# Patient Record
Sex: MALE | Race: ASIAN | NOT HISPANIC OR LATINO
[De-identification: names, ages, dates, MRNs, and addresses within clinical notes are randomized per-mention and may not be internally consistent; named-entity substitution may affect disease eponyms.]

---

## 2019-05-16 ENCOUNTER — RESULT CHARGE (OUTPATIENT)
Age: 69
End: 2019-05-16

## 2019-05-17 ENCOUNTER — APPOINTMENT (OUTPATIENT)
Dept: CARDIOLOGY | Facility: CLINIC | Age: 69
End: 2019-05-17
Payer: MEDICARE

## 2019-05-17 ENCOUNTER — APPOINTMENT (OUTPATIENT)
Dept: ENDOCRINOLOGY | Facility: CLINIC | Age: 69
End: 2019-05-17
Payer: MEDICARE

## 2019-05-17 ENCOUNTER — NON-APPOINTMENT (OUTPATIENT)
Age: 69
End: 2019-05-17

## 2019-05-17 VITALS
WEIGHT: 150 LBS | HEIGHT: 63 IN | BODY MASS INDEX: 26.58 KG/M2 | TEMPERATURE: 98.2 F | OXYGEN SATURATION: 98 % | DIASTOLIC BLOOD PRESSURE: 82 MMHG | HEART RATE: 90 BPM | SYSTOLIC BLOOD PRESSURE: 140 MMHG

## 2019-05-17 VITALS — DIASTOLIC BLOOD PRESSURE: 82 MMHG | SYSTOLIC BLOOD PRESSURE: 134 MMHG

## 2019-05-17 VITALS
TEMPERATURE: 98.3 F | HEIGHT: 63 IN | DIASTOLIC BLOOD PRESSURE: 80 MMHG | WEIGHT: 150 LBS | HEART RATE: 90 BPM | BODY MASS INDEX: 26.58 KG/M2 | SYSTOLIC BLOOD PRESSURE: 140 MMHG | OXYGEN SATURATION: 98 %

## 2019-05-17 DIAGNOSIS — Z78.9 OTHER SPECIFIED HEALTH STATUS: ICD-10-CM

## 2019-05-17 DIAGNOSIS — L81.4 OTHER MELANIN HYPERPIGMENTATION: ICD-10-CM

## 2019-05-17 DIAGNOSIS — Z86.39 PERSONAL HISTORY OF OTHER ENDOCRINE, NUTRITIONAL AND METABOLIC DISEASE: ICD-10-CM

## 2019-05-17 DIAGNOSIS — Z87.39 PERSONAL HISTORY OF OTHER DISEASES OF THE MUSCULOSKELETAL SYSTEM AND CONNECTIVE TISSUE: ICD-10-CM

## 2019-05-17 DIAGNOSIS — E11.65 TYPE 2 DIABETES MELLITUS WITH HYPERGLYCEMIA: ICD-10-CM

## 2019-05-17 PROCEDURE — 82962 GLUCOSE BLOOD TEST: CPT

## 2019-05-17 PROCEDURE — 83036 HEMOGLOBIN GLYCOSYLATED A1C: CPT | Mod: QW

## 2019-05-17 PROCEDURE — 93000 ELECTROCARDIOGRAM COMPLETE: CPT

## 2019-05-17 PROCEDURE — 99214 OFFICE O/P EST MOD 30 MIN: CPT

## 2019-05-17 PROCEDURE — 99204 OFFICE O/P NEW MOD 45 MIN: CPT

## 2019-05-17 RX ORDER — FLUORIDE 0.02 G/ML
81 LIQUID ORAL
Refills: 0 | Status: ACTIVE | COMMUNITY

## 2019-05-17 NOTE — HISTORY OF PRESENT ILLNESS
[FreeTextEntry1] : Mr. HAYS is a 68 year year old  male who  returns today in follow up with regard to a history of type 2 diabetes mellitus.  Dm opresent for 4-5 years.There is no known history of retinopathy, nephropathy. He  too denies any history of neuropathy. Current dm medication include   Metformin 500 mg bid  HGM of late has shown values to be running 130-135 iin am  . There has been no significant hypoglycemia.  denies any chest pain, sob, neurologic or ophthalmologic complaints. He  too denies any new podiatric concerns. He  is up to date with his ophthalmologic visit.\par Additional medical history includes that of  htn, and hyperlipidemia. Is on losartan and Pravastatin\par \par Notes stiffness /weakens thigh region more so on rt-notices it when he stands up-has upcoming neo t with Dr. Kam-neurosurg\par \par \par

## 2019-05-24 LAB
GLUCOSE BLDC GLUCOMTR-MCNC: 123
HBA1C MFR BLD HPLC: 8

## 2019-05-26 LAB
BASOPHILS # BLD AUTO: 0.06 K/UL
BASOPHILS NFR BLD AUTO: 0.6 %
EOSINOPHIL # BLD AUTO: 0.42 K/UL
EOSINOPHIL NFR BLD AUTO: 4.3 %
FERRITIN SERPL-MCNC: 62 NG/ML
FOLATE SERPL-MCNC: 16.2 NG/ML
HCT VFR BLD CALC: 43.9 %
HGB BLD-MCNC: 13.8 G/DL
IMM GRANULOCYTES NFR BLD AUTO: 0.2 %
IRON SATN MFR SERPL: 18 %
IRON SERPL-MCNC: 69 UG/DL
LYMPHOCYTES # BLD AUTO: 2.72 K/UL
LYMPHOCYTES NFR BLD AUTO: 27.5 %
MAN DIFF?: NORMAL
MCHC RBC-ENTMCNC: 29.1 PG
MCHC RBC-ENTMCNC: 31.4 GM/DL
MCV RBC AUTO: 92.6 FL
MONOCYTES # BLD AUTO: 1.14 K/UL
MONOCYTES NFR BLD AUTO: 11.5 %
NEUTROPHILS # BLD AUTO: 5.52 K/UL
NEUTROPHILS NFR BLD AUTO: 55.9 %
PLATELET # BLD AUTO: 457 K/UL
RBC # BLD: 4.74 M/UL
RBC # FLD: 11.9 %
TIBC SERPL-MCNC: 378 UG/DL
UIBC SERPL-MCNC: 309 UG/DL
VIT B12 SERPL-MCNC: 1366 PG/ML
WBC # FLD AUTO: 9.88 K/UL

## 2019-05-27 NOTE — HISTORY OF PRESENT ILLNESS
[de-identified] : This is a 68year old gentlemen with a history of HLD, HTN, DM and back pain. Patient had a recent MRI that was abnormal and was referred to physiatry and Neurosurgery. Patient states that he is experiencing terrible back pain that is getting increasingly worse. The pain is mainly in the lower back area and shoots down the legs. Patient has not taken anything to help with the pain. Patient also notes that he now has "dark spots" behind his left ear and on both legs near the groin area. Patient denies dyspnea, palpitations, chest pain, nausea, vomiting, dizziness and lightheadedness.\par

## 2019-05-27 NOTE — REVIEW OF SYSTEMS
[Back Pain] : back pain [Negative] : Heme/Lymph [Joint Pain] : no joint pain [Muscle Pain] : no muscle pain [Muscle Weakness] : no muscle weakness [Joint Stiffness] : no joint stiffness [Joint Swelling] : no joint swelling [Itching] : no itching [Mole Changes] : no mole changes [Nail Changes] : no nail changes [Hair Changes] : no hair changes [Skin Rash] : no skin rash [de-identified] : black spots behind ear and legs

## 2019-05-27 NOTE — PHYSICAL EXAM
[No Acute Distress] : no acute distress [Well Nourished] : well nourished [Well Developed] : well developed [Normal Sclera/Conjunctiva] : normal sclera/conjunctiva [Well-Appearing] : well-appearing [PERRL] : pupils equal round and reactive to light [EOMI] : extraocular movements intact [Normal Outer Ear/Nose] : the outer ears and nose were normal in appearance [Normal Oropharynx] : the oropharynx was normal [No JVD] : no jugular venous distention [Supple] : supple [No Lymphadenopathy] : no lymphadenopathy [Thyroid Normal, No Nodules] : the thyroid was normal and there were no nodules present [No Respiratory Distress] : no respiratory distress  [Clear to Auscultation] : lungs were clear to auscultation bilaterally [Normal Rate] : normal rate  [No Accessory Muscle Use] : no accessory muscle use [Regular Rhythm] : with a regular rhythm [Normal S1, S2] : normal S1 and S2 [No Murmur] : no murmur heard [No Carotid Bruits] : no carotid bruits [No Abdominal Bruit] : a ~M bruit was not heard ~T in the abdomen [No Varicosities] : no varicosities [Pedal Pulses Present] : the pedal pulses are present [No Extremity Clubbing/Cyanosis] : no extremity clubbing/cyanosis [No Edema] : there was no peripheral edema [No Palpable Aorta] : no palpable aorta [Soft] : abdomen soft [Non Tender] : non-tender [Non-distended] : non-distended [No Masses] : no abdominal mass palpated [No HSM] : no HSM [Normal Bowel Sounds] : normal bowel sounds [Normal Posterior Cervical Nodes] : no posterior cervical lymphadenopathy [Normal Anterior Cervical Nodes] : no anterior cervical lymphadenopathy [No CVA Tenderness] : no CVA  tenderness [No Spinal Tenderness] : no spinal tenderness [No Joint Swelling] : no joint swelling [Grossly Normal Strength/Tone] : grossly normal strength/tone [No Rash] : no rash [Normal Gait] : normal gait [Coordination Grossly Intact] : coordination grossly intact [No Focal Deficits] : no focal deficits [Deep Tendon Reflexes (DTR)] : deep tendon reflexes were 2+ and symmetric [Normal Affect] : the affect was normal [Normal Insight/Judgement] : insight and judgment were intact [de-identified] : B

## 2019-05-28 LAB
BASOPHILS # BLD AUTO: 0.04 K/UL
BASOPHILS NFR BLD AUTO: 0.4 %
EOSINOPHIL # BLD AUTO: 0.4 K/UL
EOSINOPHIL NFR BLD AUTO: 4 %
HCT VFR BLD CALC: 46.1 %
HGB BLD-MCNC: 14.4 G/DL
IMM GRANULOCYTES NFR BLD AUTO: 0.2 %
LYMPHOCYTES # BLD AUTO: 2.88 K/UL
LYMPHOCYTES NFR BLD AUTO: 29.1 %
MAN DIFF?: NORMAL
MCHC RBC-ENTMCNC: 28.9 PG
MCHC RBC-ENTMCNC: 31.2 GM/DL
MCV RBC AUTO: 92.4 FL
MONOCYTES # BLD AUTO: 0.68 K/UL
MONOCYTES NFR BLD AUTO: 6.9 %
NEUTROPHILS # BLD AUTO: 5.87 K/UL
NEUTROPHILS NFR BLD AUTO: 59.4 %
PLATELET # BLD AUTO: 481 K/UL
PSA SERPL-MCNC: 1.67 NG/ML
RBC # BLD: 4.99 M/UL
RBC # FLD: 12.2 %
VIT B12 SERPL-MCNC: 1132 PG/ML
WBC # FLD AUTO: 9.89 K/UL

## 2019-06-05 ENCOUNTER — APPOINTMENT (OUTPATIENT)
Dept: SPINE | Facility: CLINIC | Age: 69
End: 2019-06-05
Payer: MEDICARE

## 2019-06-05 VITALS
HEART RATE: 90 BPM | TEMPERATURE: 98.3 F | SYSTOLIC BLOOD PRESSURE: 154 MMHG | DIASTOLIC BLOOD PRESSURE: 80 MMHG | BODY MASS INDEX: 26.58 KG/M2 | RESPIRATION RATE: 16 BRPM | WEIGHT: 150 LBS | HEIGHT: 63 IN

## 2019-06-05 PROCEDURE — 99204 OFFICE O/P NEW MOD 45 MIN: CPT

## 2019-06-05 RX ORDER — CYCLOBENZAPRINE HYDROCHLORIDE 10 MG/1
10 TABLET, FILM COATED ORAL
Qty: 21 | Refills: 0 | Status: DISCONTINUED | COMMUNITY
Start: 2019-05-17 | End: 2019-06-05

## 2019-07-08 ENCOUNTER — APPOINTMENT (OUTPATIENT)
Dept: SPINE | Facility: CLINIC | Age: 69
End: 2019-07-08

## 2019-09-03 ENCOUNTER — RX RENEWAL (OUTPATIENT)
Age: 69
End: 2019-09-03

## 2019-09-16 ENCOUNTER — APPOINTMENT (OUTPATIENT)
Dept: ENDOCRINOLOGY | Facility: CLINIC | Age: 69
End: 2019-09-16
Payer: MEDICARE

## 2019-09-16 ENCOUNTER — RX RENEWAL (OUTPATIENT)
Age: 69
End: 2019-09-16

## 2019-09-16 ENCOUNTER — APPOINTMENT (OUTPATIENT)
Dept: CARDIOLOGY | Facility: CLINIC | Age: 69
End: 2019-09-16
Payer: MEDICARE

## 2019-09-16 VITALS
SYSTOLIC BLOOD PRESSURE: 152 MMHG | DIASTOLIC BLOOD PRESSURE: 80 MMHG | BODY MASS INDEX: 26.05 KG/M2 | HEIGHT: 63 IN | HEART RATE: 92 BPM | OXYGEN SATURATION: 98 % | WEIGHT: 147 LBS

## 2019-09-16 VITALS
BODY MASS INDEX: 26.05 KG/M2 | DIASTOLIC BLOOD PRESSURE: 84 MMHG | SYSTOLIC BLOOD PRESSURE: 160 MMHG | HEART RATE: 92 BPM | WEIGHT: 147 LBS | TEMPERATURE: 98.3 F | OXYGEN SATURATION: 98 % | HEIGHT: 63 IN

## 2019-09-16 DIAGNOSIS — R09.89 OTHER SPECIFIED SYMPTOMS AND SIGNS INVOLVING THE CIRCULATORY AND RESPIRATORY SYSTEMS: ICD-10-CM

## 2019-09-16 DIAGNOSIS — Z11.59 ENCOUNTER FOR SCREENING FOR OTHER VIRAL DISEASES: ICD-10-CM

## 2019-09-16 LAB
ALBUMIN SERPL ELPH-MCNC: 4.7 G/DL
ALBUMIN: 80
ALP BLD-CCNC: 61 U/L
ALT SERPL-CCNC: 10 U/L
ANION GAP SERPL CALC-SCNC: 12 MMOL/L
AST SERPL-CCNC: 15 U/L
BILIRUB SERPL-MCNC: 0.7 MG/DL
BUN SERPL-MCNC: 17 MG/DL
CALCIUM SERPL-MCNC: 10 MG/DL
CHLORIDE SERPL-SCNC: 102 MMOL/L
CO2 SERPL-SCNC: 25 MMOL/L
CREAT SERPL-MCNC: 1.32 MG/DL
CREAT SPEC-SCNC: 144 MG/DL
CREATININE: 200
ESTIMATED AVERAGE GLUCOSE: 166 MG/DL
FRUCTOSAMINE SERPL-MCNC: 282 UMOL/L
GLUCOSE BLDC GLUCOMTR-MCNC: 148
GLUCOSE SERPL-MCNC: 138 MG/DL
GLYCOMARK.: 8.1 UG/ML
HBA1C MFR BLD HPLC: 7.2
HBA1C MFR BLD HPLC: 7.4 %
HDLC SERPL-MCNC: 53 MG/DL
LDLC SERPL DIRECT ASSAY-MCNC: 132 MG/DL
MICROALBUMIN 24H UR DL<=1MG/L-MCNC: 2.5 MG/DL
MICROALBUMIN/CREAT 24H UR-RTO: 17 MG/G
MICROALBUMIN/CREAT UR TEST STR-RTO: NORMAL
POTASSIUM SERPL-SCNC: 6.2 MMOL/L
PROT SERPL-MCNC: 7.4 G/DL
SODIUM SERPL-SCNC: 139 MMOL/L
T4 FREE SERPL-MCNC: 1.2 NG/DL
TRIGL SERPL-MCNC: 145 MG/DL
TSH SERPL-ACNC: 2.34 UIU/ML

## 2019-09-16 PROCEDURE — 93925 LOWER EXTREMITY STUDY: CPT

## 2019-09-16 PROCEDURE — 83036 HEMOGLOBIN GLYCOSYLATED A1C: CPT | Mod: QW

## 2019-09-16 PROCEDURE — 82044 UR ALBUMIN SEMIQUANTITATIVE: CPT | Mod: QW

## 2019-09-16 PROCEDURE — 93880 EXTRACRANIAL BILAT STUDY: CPT

## 2019-09-16 PROCEDURE — 82962 GLUCOSE BLOOD TEST: CPT

## 2019-09-16 PROCEDURE — 99213 OFFICE O/P EST LOW 20 MIN: CPT | Mod: 25

## 2019-09-16 PROCEDURE — 90732 PPSV23 VACC 2 YRS+ SUBQ/IM: CPT

## 2019-09-16 PROCEDURE — 99214 OFFICE O/P EST MOD 30 MIN: CPT

## 2019-09-16 PROCEDURE — G0009: CPT

## 2019-09-16 NOTE — HISTORY OF PRESENT ILLNESS
[FreeTextEntry1] : pt presents for f/u medical issues pt with htn /dm /hyperlipidemia .pt with improved back pain feels well pt denies any chest  pain dizziness ,lightheadedness ,nausea vomiting diaphoresis\par saw dr watkins re cory seeing optqalmology today

## 2019-09-16 NOTE — PHYSICAL EXAM
[Normal Appearance] : normal appearance [General Appearance - Well Developed] : well developed [Well Groomed] : well groomed [General Appearance - Well Nourished] : well nourished [No Deformities] : no deformities [Normal Conjunctiva] : the conjunctiva exhibited no abnormalities [Eyelids - No Xanthelasma] : the eyelids demonstrated no xanthelasmas [General Appearance - In No Acute Distress] : no acute distress [Normal Oral Mucosa] : normal oral mucosa [No Oral Pallor] : no oral pallor [No Oral Cyanosis] : no oral cyanosis [Normal Jugular Venous A Waves Present] : normal jugular venous A waves present [Normal Jugular Venous V Waves Present] : normal jugular venous V waves present [No Jugular Venous Olvera A Waves] : no jugular venous olvera A waves [Respiration, Rhythm And Depth] : normal respiratory rhythm and effort [Auscultation Breath Sounds / Voice Sounds] : lungs were clear to auscultation bilaterally [Exaggerated Use Of Accessory Muscles For Inspiration] : no accessory muscle use [Heart Sounds] : normal S1 and S2 [Heart Rate And Rhythm] : heart rate and rhythm were normal [Murmurs] : no murmurs present [Abdomen Soft] : soft [Abdomen Tenderness] : non-tender [Abdomen Mass (___ Cm)] : no abdominal mass palpated [Abnormal Walk] : normal gait [Gait - Sufficient For Exercise Testing] : the gait was sufficient for exercise testing [Nail Clubbing] : no clubbing of the fingernails [Cyanosis, Localized] : no localized cyanosis [Petechial Hemorrhages (___cm)] : no petechial hemorrhages [Skin Color & Pigmentation] : normal skin color and pigmentation [] : no rash [No Venous Stasis] : no venous stasis [Skin Lesions] : no skin lesions [No Skin Ulcers] : no skin ulcer [Oriented To Time, Place, And Person] : oriented to person, place, and time [No Xanthoma] : no  xanthoma was observed [Affect] : the affect was normal [Mood] : the mood was normal [No Anxiety] : not feeling anxious

## 2019-09-16 NOTE — PHYSICAL EXAM
[Alert] : alert [No Acute Distress] : no acute distress [Well Nourished] : well nourished [Normal Sclera/Conjunctiva] : normal sclera/conjunctiva [Well Developed] : well developed [No Proptosis] : no proptosis [EOMI] : extra ocular movement intact [Thyroid Not Enlarged] : the thyroid was not enlarged [Normal Oropharynx] : the oropharynx was normal [No Thyroid Nodules] : there were no palpable thyroid nodules [No Respiratory Distress] : no respiratory distress [No Accessory Muscle Use] : no accessory muscle use [Clear to Auscultation] : lungs were clear to auscultation bilaterally [Normal Rate] : heart rate was normal  [Normal S1, S2] : normal S1 and S2 [Regular Rhythm] : with a regular rhythm [No Edema] : there was no peripheral edema [Normal Bowel Sounds] : normal bowel sounds [Soft] : abdomen soft [Not Tender] : non-tender [Anterior Cervical Nodes] : anterior cervical nodes [Post Cervical Nodes] : posterior cervical nodes [Not Distended] : not distended [Axillary Nodes] : axillary nodes [Normal] : normal and non tender [No Spinal Tenderness] : no spinal tenderness [Spine Straight] : spine straight [Normal Gait] : normal gait [No Stigmata of Cushings Syndrome] : no stigmata of cushings syndrome [Normal Strength/Tone] : muscle strength and tone were normal [No Rash] : no rash [Normal Reflexes] : deep tendon reflexes were 2+ and symmetric [No Tremors] : no tremors [Oriented x3] : oriented to person, place, and time [Acanthosis Nigricans] : no acanthosis nigricans [de-identified] : ? Carotid bruit on rt [de-identified] : ? carotid bruit on rt  decr dp piulses.

## 2019-09-22 LAB
ALBUMIN SERPL ELPH-MCNC: 4.6 G/DL
ALP BLD-CCNC: 63 U/L
ALT SERPL-CCNC: 11 U/L
APPEARANCE: CLEAR
AST SERPL-CCNC: 13 U/L
BACTERIA: NEGATIVE
BASOPHILS # BLD AUTO: 0.07 K/UL
BASOPHILS NFR BLD AUTO: 0.8 %
BILIRUB DIRECT SERPL-MCNC: 0.2 MG/DL
BILIRUB INDIRECT SERPL-MCNC: 0.6 MG/DL
BILIRUB SERPL-MCNC: 0.7 MG/DL
BILIRUBIN URINE: NEGATIVE
BLOOD URINE: NEGATIVE
CHOLEST SERPL-MCNC: 192 MG/DL
CHOLEST/HDLC SERPL: 3.5 RATIO
CK SERPL-CCNC: 96 U/L
COLOR: NORMAL
EOSINOPHIL # BLD AUTO: 0.44 K/UL
EOSINOPHIL NFR BLD AUTO: 5.1 %
FERRITIN SERPL-MCNC: 28 NG/ML
FOLATE SERPL-MCNC: 12.6 NG/ML
GLUCOSE QUALITATIVE U: NEGATIVE
HAPTOGLOB SERPL-MCNC: 177 MG/DL
HCT VFR BLD CALC: 45.5 %
HCV AB SER QL: NONREACTIVE
HCV S/CO RATIO: 0.09 S/CO
HDLC SERPL-MCNC: 55 MG/DL
HGB BLD-MCNC: 14.5 G/DL
HYALINE CASTS: 0 /LPF
IMM GRANULOCYTES NFR BLD AUTO: 0.2 %
IRON SERPL-MCNC: 74 UG/DL
KETONES URINE: NEGATIVE
LDH SERPL-CCNC: 158 U/L
LDLC SERPL CALC-MCNC: 107 MG/DL
LDLC SERPL DIRECT ASSAY-MCNC: 131 MG/DL
LEUKOCYTE ESTERASE URINE: NEGATIVE
LYMPHOCYTES # BLD AUTO: 2.63 K/UL
LYMPHOCYTES NFR BLD AUTO: 30.7 %
MAN DIFF?: NORMAL
MCHC RBC-ENTMCNC: 29 PG
MCHC RBC-ENTMCNC: 31.9 GM/DL
MCV RBC AUTO: 91 FL
MICROSCOPIC-UA: NORMAL
MONOCYTES # BLD AUTO: 0.59 K/UL
MONOCYTES NFR BLD AUTO: 6.9 %
NEUTROPHILS # BLD AUTO: 4.82 K/UL
NEUTROPHILS NFR BLD AUTO: 56.3 %
NITRITE URINE: NEGATIVE
PH URINE: 6.5
PLATELET # BLD AUTO: 456 K/UL
PROT SERPL-MCNC: 7.3 G/DL
PROTEIN URINE: NORMAL
RBC # BLD: 5 M/UL
RBC # FLD: 12.2 %
RED BLOOD CELLS URINE: 1 /HPF
SPECIFIC GRAVITY URINE: 1.02
SQUAMOUS EPITHELIAL CELLS: 0 /HPF
TRANSFERRIN SERPL-MCNC: 328 MG/DL
TRIGL SERPL-MCNC: 149 MG/DL
TSH SERPL-ACNC: 2.22 UIU/ML
UROBILINOGEN URINE: NORMAL
VIT B12 SERPL-MCNC: 614 PG/ML
WBC # FLD AUTO: 8.57 K/UL
WHITE BLOOD CELLS URINE: 0 /HPF

## 2019-09-28 LAB
25(OH)D3 SERPL-MCNC: 31.3 NG/ML
ANION GAP SERPL CALC-SCNC: 12 MMOL/L
BUN SERPL-MCNC: 17 MG/DL
CALCIUM SERPL-MCNC: 9.4 MG/DL
CHLORIDE SERPL-SCNC: 102 MMOL/L
CO2 SERPL-SCNC: 25 MMOL/L
CREAT SERPL-MCNC: 1.23 MG/DL
GLUCOSE SERPL-MCNC: 148 MG/DL
POTASSIUM SERPL-SCNC: 4.6 MMOL/L
SODIUM SERPL-SCNC: 139 MMOL/L

## 2019-10-24 ENCOUNTER — APPOINTMENT (OUTPATIENT)
Dept: CARDIOLOGY | Facility: CLINIC | Age: 69
End: 2019-10-24
Payer: MEDICARE

## 2019-10-24 ENCOUNTER — NON-APPOINTMENT (OUTPATIENT)
Age: 69
End: 2019-10-24

## 2019-10-24 VITALS
WEIGHT: 146 LBS | TEMPERATURE: 97.9 F | BODY MASS INDEX: 25.87 KG/M2 | OXYGEN SATURATION: 98 % | DIASTOLIC BLOOD PRESSURE: 80 MMHG | SYSTOLIC BLOOD PRESSURE: 160 MMHG | HEART RATE: 94 BPM | HEIGHT: 63 IN

## 2019-10-24 PROCEDURE — 90653 IIV ADJUVANT VACCINE IM: CPT

## 2019-10-24 PROCEDURE — G0008: CPT

## 2019-10-24 PROCEDURE — 93000 ELECTROCARDIOGRAM COMPLETE: CPT

## 2019-10-24 PROCEDURE — 99213 OFFICE O/P EST LOW 20 MIN: CPT | Mod: 25

## 2019-10-24 NOTE — PHYSICAL EXAM
[General Appearance - Well Developed] : well developed [Well Groomed] : well groomed [Normal Appearance] : normal appearance [General Appearance - Well Nourished] : well nourished [No Deformities] : no deformities [General Appearance - In No Acute Distress] : no acute distress [Normal Conjunctiva] : the conjunctiva exhibited no abnormalities [Eyelids - No Xanthelasma] : the eyelids demonstrated no xanthelasmas [No Oral Pallor] : no oral pallor [Normal Oral Mucosa] : normal oral mucosa [No Oral Cyanosis] : no oral cyanosis [Normal Jugular Venous A Waves Present] : normal jugular venous A waves present [Normal Jugular Venous V Waves Present] : normal jugular venous V waves present [No Jugular Venous Olvera A Waves] : no jugular venous olvera A waves [Respiration, Rhythm And Depth] : normal respiratory rhythm and effort [Auscultation Breath Sounds / Voice Sounds] : lungs were clear to auscultation bilaterally [Heart Rate And Rhythm] : heart rate and rhythm were normal [Exaggerated Use Of Accessory Muscles For Inspiration] : no accessory muscle use [Murmurs] : no murmurs present [Heart Sounds] : normal S1 and S2 [Abdomen Soft] : soft [Abdomen Tenderness] : non-tender [Abdomen Mass (___ Cm)] : no abdominal mass palpated [Abnormal Walk] : normal gait [Gait - Sufficient For Exercise Testing] : the gait was sufficient for exercise testing [Nail Clubbing] : no clubbing of the fingernails [Petechial Hemorrhages (___cm)] : no petechial hemorrhages [Cyanosis, Localized] : no localized cyanosis [Skin Color & Pigmentation] : normal skin color and pigmentation [No Venous Stasis] : no venous stasis [] : no rash [No Skin Ulcers] : no skin ulcer [Skin Lesions] : no skin lesions [No Xanthoma] : no  xanthoma was observed [Affect] : the affect was normal [Oriented To Time, Place, And Person] : oriented to person, place, and time [Mood] : the mood was normal [No Anxiety] : not feeling anxious

## 2019-10-24 NOTE — HISTORY OF PRESENT ILLNESS
[FreeTextEntry1] : Hair is a 67yo male here for pre-op clearance. He has hx of HTN, DM2, HLD, and DARWIN. He is scheduled to have cataract surgery of the right eye on 10/29. He is doing well overall. Denies chest pain, palpitations, shortness of breath, or dizziness.

## 2019-10-26 LAB
ALBUMIN SERPL ELPH-MCNC: 4.6 G/DL
ALP BLD-CCNC: 56 U/L
ALT SERPL-CCNC: 10 U/L
ANION GAP SERPL CALC-SCNC: 14 MMOL/L
AST SERPL-CCNC: 13 U/L
BASOPHILS # BLD AUTO: 0.08 K/UL
BASOPHILS NFR BLD AUTO: 0.7 %
BILIRUB DIRECT SERPL-MCNC: 0.1 MG/DL
BILIRUB INDIRECT SERPL-MCNC: 0.2 MG/DL
BILIRUB SERPL-MCNC: 0.3 MG/DL
BUN SERPL-MCNC: 21 MG/DL
CALCIUM SERPL-MCNC: 9.9 MG/DL
CHLORIDE SERPL-SCNC: 103 MMOL/L
CHOLEST SERPL-MCNC: 146 MG/DL
CHOLEST/HDLC SERPL: 2.9 RATIO
CK SERPL-CCNC: 85 U/L
CO2 SERPL-SCNC: 24 MMOL/L
CREAT SERPL-MCNC: 1.18 MG/DL
CREAT SPEC-SCNC: 152 MG/DL
EOSINOPHIL # BLD AUTO: 0.65 K/UL
EOSINOPHIL NFR BLD AUTO: 5.7 %
ESTIMATED AVERAGE GLUCOSE: 160 MG/DL
FERRITIN SERPL-MCNC: 21 NG/ML
FOLATE SERPL-MCNC: 13.9 NG/ML
GLUCOSE SERPL-MCNC: 111 MG/DL
HAPTOGLOB SERPL-MCNC: 199 MG/DL
HBA1C MFR BLD HPLC: 7.2 %
HCT VFR BLD CALC: 44.1 %
HDLC SERPL-MCNC: 50 MG/DL
HGB BLD-MCNC: 14.1 G/DL
IMM GRANULOCYTES NFR BLD AUTO: 0.4 %
IRON SATN MFR SERPL: 10 %
IRON SERPL-MCNC: 39 UG/DL
LDLC SERPL CALC-MCNC: 61 MG/DL
LDLC SERPL DIRECT ASSAY-MCNC: 85 MG/DL
LYMPHOCYTES # BLD AUTO: 3.52 K/UL
LYMPHOCYTES NFR BLD AUTO: 30.9 %
MAN DIFF?: NORMAL
MCHC RBC-ENTMCNC: 28.7 PG
MCHC RBC-ENTMCNC: 32 GM/DL
MCV RBC AUTO: 89.6 FL
MICROALBUMIN 24H UR DL<=1MG/L-MCNC: 1.5 MG/DL
MICROALBUMIN/CREAT 24H UR-RTO: 10 MG/G
MONOCYTES # BLD AUTO: 0.81 K/UL
MONOCYTES NFR BLD AUTO: 7.1 %
NEUTROPHILS # BLD AUTO: 6.27 K/UL
NEUTROPHILS NFR BLD AUTO: 55.2 %
PLATELET # BLD AUTO: 455 K/UL
POTASSIUM SERPL-SCNC: 5 MMOL/L
PROT SERPL-MCNC: 7 G/DL
RBC # BLD: 4.92 M/UL
RBC # FLD: 12.3 %
SODIUM SERPL-SCNC: 140 MMOL/L
TIBC SERPL-MCNC: 398 UG/DL
TRANSFERRIN SERPL-MCNC: 330 MG/DL
TRIGL SERPL-MCNC: 173 MG/DL
UIBC SERPL-MCNC: 359 UG/DL
VIT B12 SERPL-MCNC: 898 PG/ML
WBC # FLD AUTO: 11.38 K/UL

## 2019-10-28 ENCOUNTER — CLINICAL ADVICE (OUTPATIENT)
Age: 69
End: 2019-10-28

## 2019-11-06 ENCOUNTER — CHART COPY (OUTPATIENT)
Age: 69
End: 2019-11-06

## 2019-11-10 LAB
BASOPHILS # BLD AUTO: 0.06 K/UL
BASOPHILS NFR BLD AUTO: 0.5 %
EOSINOPHIL # BLD AUTO: 0.54 K/UL
EOSINOPHIL NFR BLD AUTO: 4.7 %
HCT VFR BLD CALC: 44.1 %
HGB BLD-MCNC: 14.2 G/DL
IMM GRANULOCYTES NFR BLD AUTO: 0.3 %
LYMPHOCYTES # BLD AUTO: 3.06 K/UL
LYMPHOCYTES NFR BLD AUTO: 26.4 %
MAN DIFF?: NORMAL
MCHC RBC-ENTMCNC: 28.9 PG
MCHC RBC-ENTMCNC: 32.2 GM/DL
MCV RBC AUTO: 89.8 FL
MONOCYTES # BLD AUTO: 0.94 K/UL
MONOCYTES NFR BLD AUTO: 8.1 %
NEUTROPHILS # BLD AUTO: 6.97 K/UL
NEUTROPHILS NFR BLD AUTO: 60 %
PLATELET # BLD AUTO: 464 K/UL
RBC # BLD: 4.91 M/UL
RBC # FLD: 12.1 %
WBC # FLD AUTO: 11.61 K/UL

## 2020-01-17 ENCOUNTER — APPOINTMENT (OUTPATIENT)
Dept: CARDIOLOGY | Facility: CLINIC | Age: 70
End: 2020-01-17
Payer: MEDICARE

## 2020-01-17 ENCOUNTER — APPOINTMENT (OUTPATIENT)
Dept: ENDOCRINOLOGY | Facility: CLINIC | Age: 70
End: 2020-01-17
Payer: MEDICARE

## 2020-01-17 VITALS
WEIGHT: 146 LBS | SYSTOLIC BLOOD PRESSURE: 160 MMHG | OXYGEN SATURATION: 99 % | DIASTOLIC BLOOD PRESSURE: 90 MMHG | BODY MASS INDEX: 25.87 KG/M2 | HEART RATE: 91 BPM | HEIGHT: 63 IN | TEMPERATURE: 98.6 F

## 2020-01-17 VITALS
HEIGHT: 63 IN | WEIGHT: 146 LBS | DIASTOLIC BLOOD PRESSURE: 90 MMHG | TEMPERATURE: 98.6 F | HEART RATE: 91 BPM | BODY MASS INDEX: 25.87 KG/M2 | SYSTOLIC BLOOD PRESSURE: 160 MMHG | OXYGEN SATURATION: 99 %

## 2020-01-17 DIAGNOSIS — H26.9 UNSPECIFIED CATARACT: ICD-10-CM

## 2020-01-17 LAB — HBA1C MFR BLD HPLC: 7.6

## 2020-01-17 PROCEDURE — 99214 OFFICE O/P EST MOD 30 MIN: CPT

## 2020-01-17 PROCEDURE — 82962 GLUCOSE BLOOD TEST: CPT

## 2020-01-17 PROCEDURE — 83036 HEMOGLOBIN GLYCOSYLATED A1C: CPT | Mod: QW

## 2020-01-17 PROCEDURE — 99213 OFFICE O/P EST LOW 20 MIN: CPT

## 2020-01-17 RX ORDER — METHYLPREDNISOLONE 4 MG/1
4 TABLET ORAL
Qty: 1 | Refills: 0 | Status: DISCONTINUED | COMMUNITY
Start: 2019-05-17 | End: 2020-01-17

## 2020-01-17 NOTE — PHYSICAL EXAM
[General Appearance - Well Developed] : well developed [Normal Appearance] : normal appearance [Well Groomed] : well groomed [General Appearance - Well Nourished] : well nourished [No Deformities] : no deformities [General Appearance - In No Acute Distress] : no acute distress [Eyelids - No Xanthelasma] : the eyelids demonstrated no xanthelasmas [Normal Conjunctiva] : the conjunctiva exhibited no abnormalities [Normal Oral Mucosa] : normal oral mucosa [No Oral Pallor] : no oral pallor [No Oral Cyanosis] : no oral cyanosis [Normal Jugular Venous A Waves Present] : normal jugular venous A waves present [Normal Jugular Venous V Waves Present] : normal jugular venous V waves present [No Jugular Venous Olvrea A Waves] : no jugular venous olvera A waves [Respiration, Rhythm And Depth] : normal respiratory rhythm and effort [Exaggerated Use Of Accessory Muscles For Inspiration] : no accessory muscle use [Auscultation Breath Sounds / Voice Sounds] : lungs were clear to auscultation bilaterally [Heart Rate And Rhythm] : heart rate and rhythm were normal [Murmurs] : no murmurs present [Heart Sounds] : normal S1 and S2 [Abdomen Tenderness] : non-tender [Abdomen Soft] : soft [Abnormal Walk] : normal gait [Abdomen Mass (___ Cm)] : no abdominal mass palpated [Nail Clubbing] : no clubbing of the fingernails [Gait - Sufficient For Exercise Testing] : the gait was sufficient for exercise testing [Cyanosis, Localized] : no localized cyanosis [Petechial Hemorrhages (___cm)] : no petechial hemorrhages [Skin Color & Pigmentation] : normal skin color and pigmentation [No Venous Stasis] : no venous stasis [] : no rash [Skin Lesions] : no skin lesions [No Skin Ulcers] : no skin ulcer [No Xanthoma] : no  xanthoma was observed [Mood] : the mood was normal [Affect] : the affect was normal [Oriented To Time, Place, And Person] : oriented to person, place, and time [No Anxiety] : not feeling anxious [FreeTextEntry1] : reproducible pain back area

## 2020-01-17 NOTE — HISTORY OF PRESENT ILLNESS
[FreeTextEntry1] : Hair is a 70yo male who presents for routine follow-up. Patient has PMH anemia, DM2, HLD, and lumbar  disc disease. Patient reports he is doing well overall, has no issues or concerns. Patient also had cataract surgery on October and doing well. Patient also reporting  continued back stiffness that extends to his leg muscles. He has hx of lumbar disc disease and lumbar radiculopathy.

## 2020-01-20 LAB — GLUCOSE BLDC GLUCOMTR-MCNC: 137

## 2020-01-24 LAB
ALBUMIN SERPL ELPH-MCNC: 4.8 G/DL
ALP BLD-CCNC: 64 U/L
ALT SERPL-CCNC: 11 U/L
ANION GAP SERPL CALC-SCNC: 15 MMOL/L
AST SERPL-CCNC: 16 U/L
BASOPHILS # BLD AUTO: 0.06 K/UL
BASOPHILS NFR BLD AUTO: 0.6 %
BILIRUB DIRECT SERPL-MCNC: 0.2 MG/DL
BILIRUB INDIRECT SERPL-MCNC: 0.6 MG/DL
BILIRUB SERPL-MCNC: 0.8 MG/DL
BUN SERPL-MCNC: 17 MG/DL
CALCIUM SERPL-MCNC: 10.3 MG/DL
CHLORIDE SERPL-SCNC: 100 MMOL/L
CHOLEST SERPL-MCNC: 170 MG/DL
CHOLEST/HDLC SERPL: 3.3 RATIO
CK SERPL-CCNC: 97 U/L
CO2 SERPL-SCNC: 24 MMOL/L
CREAT SERPL-MCNC: 1.19 MG/DL
CREAT SPEC-SCNC: 146 MG/DL
EOSINOPHIL # BLD AUTO: 0.35 K/UL
EOSINOPHIL NFR BLD AUTO: 3.7 %
ESTIMATED AVERAGE GLUCOSE: 177 MG/DL
FERRITIN SERPL-MCNC: 26 NG/ML
FOLATE SERPL-MCNC: 17.2 NG/ML
GLUCOSE SERPL-MCNC: 144 MG/DL
HBA1C MFR BLD HPLC: 7.8 %
HCT VFR BLD CALC: 46.4 %
HDLC SERPL-MCNC: 52 MG/DL
HGB BLD-MCNC: 14.7 G/DL
IMM GRANULOCYTES NFR BLD AUTO: 0.4 %
IRON SATN MFR SERPL: 22 %
IRON SERPL-MCNC: 89 UG/DL
LDLC SERPL CALC-MCNC: 95 MG/DL
LDLC SERPL DIRECT ASSAY-MCNC: 103 MG/DL
LYMPHOCYTES # BLD AUTO: 2.52 K/UL
LYMPHOCYTES NFR BLD AUTO: 26.9 %
MAN DIFF?: NORMAL
MCHC RBC-ENTMCNC: 28.4 PG
MCHC RBC-ENTMCNC: 31.7 GM/DL
MCV RBC AUTO: 89.6 FL
MICROALBUMIN 24H UR DL<=1MG/L-MCNC: 5 MG/DL
MICROALBUMIN/CREAT 24H UR-RTO: 34 MG/G
MONOCYTES # BLD AUTO: 0.78 K/UL
MONOCYTES NFR BLD AUTO: 8.3 %
NEUTROPHILS # BLD AUTO: 5.61 K/UL
NEUTROPHILS NFR BLD AUTO: 60.1 %
PLATELET # BLD AUTO: 315 K/UL
POTASSIUM SERPL-SCNC: 5.6 MMOL/L
PROT SERPL-MCNC: 7.5 G/DL
PSA SERPL-MCNC: 1.8 NG/ML
RBC # BLD: 5.18 M/UL
RBC # FLD: 12.5 %
SODIUM SERPL-SCNC: 140 MMOL/L
TIBC SERPL-MCNC: 403 UG/DL
TRIGL SERPL-MCNC: 114 MG/DL
UIBC SERPL-MCNC: 314 UG/DL
VIT B12 SERPL-MCNC: 807 PG/ML
WBC # FLD AUTO: 9.36 K/UL

## 2020-02-02 NOTE — HISTORY OF PRESENT ILLNESS
[FreeTextEntry1] : Mr. HAYS is a 69 year year old  male who  returns today in follow up with regard to a history of type 2 diabetes mellitus.  The Diabetes has been present for 4-5 years.There is no known history of retinopathy, nephropathy. He  too denies any history of neuropathy. Current dm medication include   Metformin 500 mg one in Am and two in PM.  HGM of late has shown values to be running 120's to 140 . There has been no significant hypoglycemia.  denies any chest pain, sob, neurologic or ophthalmologic complaints. He  too denies any new podiatric concerns. He  is up to date with his ophthalmologic visit.\par Additional medical history includes that of  htn, and hyperlipidemia. Is on losartan and Pravastatin and Ezetimibe.\par A1c slightly up from prior at 7.6% post holidays.\par \par \par \par \par \par

## 2020-02-02 NOTE — PHYSICAL EXAM
[Alert] : alert [No Acute Distress] : no acute distress [Well Nourished] : well nourished [Well Developed] : well developed [Normal Sclera/Conjunctiva] : normal sclera/conjunctiva [EOMI] : extra ocular movement intact [No Proptosis] : no proptosis [Normal Oropharynx] : the oropharynx was normal [Thyroid Not Enlarged] : the thyroid was not enlarged [No Thyroid Nodules] : there were no palpable thyroid nodules [No Respiratory Distress] : no respiratory distress [No Accessory Muscle Use] : no accessory muscle use [Clear to Auscultation] : lungs were clear to auscultation bilaterally [Normal Rate] : heart rate was normal  [Normal S1, S2] : normal S1 and S2 [Regular Rhythm] : with a regular rhythm [Pedal Pulses Normal] : the pedal pulses are present [No Edema] : there was no peripheral edema [Normal Bowel Sounds] : normal bowel sounds [Not Tender] : non-tender [Soft] : abdomen soft [Not Distended] : not distended [Post Cervical Nodes] : posterior cervical nodes [Anterior Cervical Nodes] : anterior cervical nodes [Axillary Nodes] : axillary nodes [Normal] : normal and non tender [No Spinal Tenderness] : no spinal tenderness [Spine Straight] : spine straight [No Stigmata of Cushings Syndrome] : no stigmata of cushings syndrome [Normal Gait] : normal gait [No Rash] : no rash [Normal Strength/Tone] : muscle strength and tone were normal [Normal Reflexes] : deep tendon reflexes were 2+ and symmetric [No Tremors] : no tremors [Oriented x3] : oriented to person, place, and time [Acanthosis Nigricans] : no acanthosis nigricans

## 2020-04-17 ENCOUNTER — APPOINTMENT (OUTPATIENT)
Dept: ENDOCRINOLOGY | Facility: CLINIC | Age: 70
End: 2020-04-17
Payer: MEDICARE

## 2020-04-17 ENCOUNTER — APPOINTMENT (OUTPATIENT)
Dept: ENDOCRINOLOGY | Facility: CLINIC | Age: 70
End: 2020-04-17

## 2020-04-17 ENCOUNTER — APPOINTMENT (OUTPATIENT)
Dept: CARDIOLOGY | Facility: CLINIC | Age: 70
End: 2020-04-17
Payer: MEDICARE

## 2020-04-17 PROCEDURE — 99213 OFFICE O/P EST LOW 20 MIN: CPT | Mod: 95

## 2020-04-17 PROCEDURE — 99214 OFFICE O/P EST MOD 30 MIN: CPT | Mod: 95

## 2020-04-17 NOTE — PHYSICAL EXAM
[Alert] : alert [Healthy Appearance] : healthy appearance [No Acute Distress] : no acute distress [Well Developed] : well developed [Oriented x3] : oriented to person, place, and time [Normal Affect] : the affect was normal [Normal Insight/Judgement] : insight and judgment were intact [Normal Mood] : the mood was normal

## 2020-04-17 NOTE — HISTORY OF PRESENT ILLNESS
[Home] : at home, [unfilled] , at the time of the visit. [Medical Office: ([facility])___] : at the medical office located in  [Patient] : the patient [Self] : self [FreeTextEntry1] : Mr. HAYS is a 69 year year old  male who  returns today bit video telemed ov  in follow up with regard to a history of type 2 diabetes mellitus.  The Diabetes has been present for about 5 years.There is no known history of retinopathy, nephropathy. He  too denies any history of neuropathy but note occasional tingling in gutierrez feet. Current dm medication include   Metformin 500 mg one in Am and one in PM. (had been one am and TWO in pm.  HGM of late has shown values to be running  1590 range but he is not testing that often. . There has been no significant hypoglycemia.  He  denies any chest pain, sob, neurologic or ophthalmologic complaints. He  too denies any new podiatric concerns. He  is up to date with his ophthalmologic visit.\par Additional medical history includes that of  htn, and hyperlipidemia. Is on losartan and Pravastatin and Ezetimibe. He though states he did not realize he was to be taking the Ezetimibe with the Pravastatin.  Denies muscle aches.\par \par \par \par \par \par \par

## 2020-04-19 NOTE — HISTORY OF PRESENT ILLNESS
[Home] : at home, [unfilled] , at the time of the visit. [Medical Office: ([facility])___] : at the medical office located in  [Patient] : the patient [Self] : self [FreeTextEntry1] : Hair is a 68 yo male who presents for a tele-health visit today. He has no issues or concerns for today. He reports that he also saw Dr. Davison today for a tele-health visit. Patient denies chest pain, shortness of breath, palpitations, dizziness, vision changes, n/v, abdominal pain, changes in bowel/bladder habits,  or appetite.

## 2020-06-19 ENCOUNTER — APPOINTMENT (OUTPATIENT)
Dept: CARDIOLOGY | Facility: CLINIC | Age: 70
End: 2020-06-19
Payer: MEDICARE

## 2020-06-19 ENCOUNTER — APPOINTMENT (OUTPATIENT)
Dept: ENDOCRINOLOGY | Facility: CLINIC | Age: 70
End: 2020-06-19
Payer: MEDICARE

## 2020-06-19 ENCOUNTER — NON-APPOINTMENT (OUTPATIENT)
Age: 70
End: 2020-06-19

## 2020-06-19 VITALS
BODY MASS INDEX: 26.05 KG/M2 | TEMPERATURE: 98.5 F | DIASTOLIC BLOOD PRESSURE: 82 MMHG | HEART RATE: 90 BPM | HEIGHT: 63 IN | OXYGEN SATURATION: 98 % | WEIGHT: 147 LBS | SYSTOLIC BLOOD PRESSURE: 142 MMHG

## 2020-06-19 VITALS
TEMPERATURE: 98.5 F | HEIGHT: 63 IN | WEIGHT: 147 LBS | DIASTOLIC BLOOD PRESSURE: 80 MMHG | OXYGEN SATURATION: 99 % | BODY MASS INDEX: 26.05 KG/M2 | HEART RATE: 82 BPM | SYSTOLIC BLOOD PRESSURE: 130 MMHG

## 2020-06-19 DIAGNOSIS — M54.16 RADICULOPATHY, LUMBAR REGION: ICD-10-CM

## 2020-06-19 DIAGNOSIS — Z71.89 OTHER SPECIFIED COUNSELING: ICD-10-CM

## 2020-06-19 LAB
GLUCOSE BLDC GLUCOMTR-MCNC: 135
HBA1C MFR BLD HPLC: 7.9

## 2020-06-19 PROCEDURE — 99214 OFFICE O/P EST MOD 30 MIN: CPT

## 2020-06-19 PROCEDURE — 83036 HEMOGLOBIN GLYCOSYLATED A1C: CPT | Mod: QW

## 2020-06-19 PROCEDURE — 82962 GLUCOSE BLOOD TEST: CPT

## 2020-06-19 PROCEDURE — 93000 ELECTROCARDIOGRAM COMPLETE: CPT

## 2020-06-19 NOTE — HISTORY OF PRESENT ILLNESS
[FreeTextEntry1] : Mr. HAYS is a 69 year year old  male who  returns today   in follow up with regard to a history of type 2 diabetes mellitus.  The Diabetes has been present for about 5 years.There is no known history of retinopathy, nephropathy. He  too denies any history of neuropathy but note occasional tingling in gutierrez feet. Current dm medication include   Metformin 500 mg one in Am and one in PM.   HGM of late has shown values to be running  under 150 range. There has been no significant hypoglycemia.  He  denies any chest pain, sob, neurologic or ophthalmologic complaints. He  too denies any new podiatric concerns. He  is up to date with his ophthalmologic visit.\par Additional medical history includes that of  htn, and hyperlipidemia. Is on losartan and Pravastatin and Ezetimibe. .  Denies muscle aches.Opho q 4 months.\par \par \par \par \par \par \par

## 2020-06-19 NOTE — PHYSICAL EXAM
[General Appearance - Well Developed] : well developed [Normal Appearance] : normal appearance [General Appearance - Well Nourished] : well nourished [No Deformities] : no deformities [Well Groomed] : well groomed [General Appearance - In No Acute Distress] : no acute distress [Normal Conjunctiva] : the conjunctiva exhibited no abnormalities [Eyelids - No Xanthelasma] : the eyelids demonstrated no xanthelasmas [Normal Oral Mucosa] : normal oral mucosa [No Oral Pallor] : no oral pallor [No Oral Cyanosis] : no oral cyanosis [Normal Jugular Venous V Waves Present] : normal jugular venous V waves present [Normal Jugular Venous A Waves Present] : normal jugular venous A waves present [Respiration, Rhythm And Depth] : normal respiratory rhythm and effort [No Jugular Venous Olvera A Waves] : no jugular venous olvera A waves [Auscultation Breath Sounds / Voice Sounds] : lungs were clear to auscultation bilaterally [Heart Rate And Rhythm] : heart rate and rhythm were normal [Heart Sounds] : normal S1 and S2 [Exaggerated Use Of Accessory Muscles For Inspiration] : no accessory muscle use [Murmurs] : no murmurs present [Abdomen Tenderness] : non-tender [Abdomen Soft] : soft [Abdomen Mass (___ Cm)] : no abdominal mass palpated [Abnormal Walk] : normal gait [FreeTextEntry1] : reproducible pain back area  [Gait - Sufficient For Exercise Testing] : the gait was sufficient for exercise testing [Cyanosis, Localized] : no localized cyanosis [Nail Clubbing] : no clubbing of the fingernails [Petechial Hemorrhages (___cm)] : no petechial hemorrhages [Skin Color & Pigmentation] : normal skin color and pigmentation [No Venous Stasis] : no venous stasis [Skin Lesions] : no skin lesions [] : no rash [Oriented To Time, Place, And Person] : oriented to person, place, and time [No Xanthoma] : no  xanthoma was observed [No Skin Ulcers] : no skin ulcer [Mood] : the mood was normal [No Anxiety] : not feeling anxious [Affect] : the affect was normal

## 2020-06-19 NOTE — HISTORY OF PRESENT ILLNESS
[FreeTextEntry1] : Hair is a 68yo male with PMH anemia, DM2, HLD, and lumbar disc disease presents today for routine follow-up. Patient reports continued lower back stiffness that extends down his left leg. He has been massaging the area to help with the stiffness, he was previously provided with Physiatry referral but was not able to schedule. Otherwise, pt doing well, no issues or complaints. Patient denies chest pain, shortness of breath, palpitations, dizziness, vision changes, n/v, abdominal pain, changes in bowel/bladder habits,  or appetite. pt with are fatigue

## 2020-06-19 NOTE — PHYSICAL EXAM
[Alert] : alert [Well Nourished] : well nourished [No Acute Distress] : no acute distress [Well Developed] : well developed [EOMI] : extra ocular movement intact [Normal Sclera/Conjunctiva] : normal sclera/conjunctiva [No Proptosis] : no proptosis [Thyroid Not Enlarged] : the thyroid was not enlarged [Normal Oropharynx] : the oropharynx was normal [No Respiratory Distress] : no respiratory distress [No Thyroid Nodules] : no palpable thyroid nodules [No Accessory Muscle Use] : no accessory muscle use [Clear to Auscultation] : lungs were clear to auscultation bilaterally [Normal S1, S2] : normal S1 and S2 [Normal Rate] : heart rate was normal [Regular Rhythm] : with a regular rhythm [No Edema] : no peripheral edema [Pedal Pulses Normal] : the pedal pulses are present [Normal Bowel Sounds] : normal bowel sounds [Not Tender] : non-tender [Not Distended] : not distended [Soft] : abdomen soft [Normal Anterior Cervical Nodes] : no anterior cervical lymphadenopathy [No Spinal Tenderness] : no spinal tenderness [Normal Posterior Cervical Nodes] : no posterior cervical lymphadenopathy [Spine Straight] : spine straight [No Stigmata of Cushings Syndrome] : no stigmata of Cushings Syndrome [Normal Gait] : normal gait [Normal Strength/Tone] : muscle strength and tone were normal [No Rash] : no rash [Acanthosis Nigricans] : no acanthosis nigricans [Normal Reflexes] : deep tendon reflexes were 2+ and symmetric [No Tremors] : no tremors [Oriented x3] : oriented to person, place, and time

## 2020-06-26 LAB
25(OH)D3 SERPL-MCNC: 41.6 NG/ML
ALBUMIN SERPL ELPH-MCNC: 4.9 G/DL
ALP BLD-CCNC: 59 U/L
ALT SERPL-CCNC: 12 U/L
ANION GAP SERPL CALC-SCNC: 16 MMOL/L
AST SERPL-CCNC: 15 U/L
BILIRUB SERPL-MCNC: 0.9 MG/DL
BUN SERPL-MCNC: 19 MG/DL
CALCIUM SERPL-MCNC: 10.6 MG/DL
CHLORIDE SERPL-SCNC: 100 MMOL/L
CHOLEST SERPL-MCNC: 158 MG/DL
CO2 SERPL-SCNC: 25 MMOL/L
CREAT SERPL-MCNC: 1.34 MG/DL
CREAT SPEC-SCNC: 171 MG/DL
GLUCOSE SERPL-MCNC: 132 MG/DL
HDLC SERPL-MCNC: 53 MG/DL
LDLC SERPL DIRECT ASSAY-MCNC: 93 MG/DL
MICROALBUMIN 24H UR DL<=1MG/L-MCNC: 2 MG/DL
MICROALBUMIN/CREAT 24H UR-RTO: 12 MG/G
POTASSIUM SERPL-SCNC: 5.4 MMOL/L
PROT SERPL-MCNC: 7.8 G/DL
SODIUM SERPL-SCNC: 142 MMOL/L
TRIGL SERPL-MCNC: 104 MG/DL

## 2020-07-14 LAB
APPEARANCE: CLEAR
BACTERIA: NEGATIVE
BASOPHILS # BLD AUTO: 0.04 K/UL
BASOPHILS NFR BLD AUTO: 0.4 %
BILIRUBIN URINE: NEGATIVE
BLOOD URINE: NEGATIVE
CK SERPL-CCNC: 94 U/L
COLOR: YELLOW
EOSINOPHIL # BLD AUTO: 0.48 K/UL
EOSINOPHIL NFR BLD AUTO: 4.8 %
FERRITIN SERPL-MCNC: 33 NG/ML
FOLATE SERPL-MCNC: 14.9 NG/ML
GLUCOSE QUALITATIVE U: NEGATIVE
HCT VFR BLD CALC: 47.1 %
HGB BLD-MCNC: 14.4 G/DL
HYALINE CASTS: 0 /LPF
IMM GRANULOCYTES NFR BLD AUTO: 0.3 %
IRON SATN MFR SERPL: 21 %
IRON SERPL-MCNC: 86 UG/DL
KETONES URINE: NEGATIVE
LEUKOCYTE ESTERASE URINE: NEGATIVE
LYMPHOCYTES # BLD AUTO: 2.74 K/UL
LYMPHOCYTES NFR BLD AUTO: 27.3 %
MAN DIFF?: NORMAL
MCHC RBC-ENTMCNC: 28 PG
MCHC RBC-ENTMCNC: 30.6 GM/DL
MCV RBC AUTO: 91.6 FL
MICROSCOPIC-UA: NORMAL
MONOCYTES # BLD AUTO: 0.79 K/UL
MONOCYTES NFR BLD AUTO: 7.9 %
NEUTROPHILS # BLD AUTO: 5.94 K/UL
NEUTROPHILS NFR BLD AUTO: 59.3 %
NITRITE URINE: NEGATIVE
PH URINE: 6.5
PLATELET # BLD AUTO: 435 K/UL
PROTEIN URINE: NORMAL
RBC # BLD: 5.14 M/UL
RBC # FLD: 12.6 %
RED BLOOD CELLS URINE: 1 /HPF
SARS-COV-2 IGG SERPL IA-ACNC: 9.48 AU/ML
SARS-COV-2 IGG SERPL QL IA: NEGATIVE
SPECIFIC GRAVITY URINE: 1.02
SQUAMOUS EPITHELIAL CELLS: 0 /HPF
T4 FREE SERPL-MCNC: 1.3 NG/DL
TIBC SERPL-MCNC: 415 UG/DL
TSH SERPL-ACNC: 3.08 UIU/ML
UIBC SERPL-MCNC: 329 UG/DL
UROBILINOGEN URINE: NORMAL
VIT B12 SERPL-MCNC: 828 PG/ML
WBC # FLD AUTO: 10.02 K/UL
WHITE BLOOD CELLS URINE: 1 /HPF

## 2020-09-18 ENCOUNTER — NON-APPOINTMENT (OUTPATIENT)
Age: 70
End: 2020-09-18

## 2020-09-18 ENCOUNTER — APPOINTMENT (OUTPATIENT)
Dept: CARDIOLOGY | Facility: CLINIC | Age: 70
End: 2020-09-18
Payer: MEDICARE

## 2020-09-18 VITALS
WEIGHT: 148 LBS | OXYGEN SATURATION: 96 % | HEART RATE: 88 BPM | HEIGHT: 63 IN | TEMPERATURE: 98 F | BODY MASS INDEX: 26.22 KG/M2 | DIASTOLIC BLOOD PRESSURE: 80 MMHG | SYSTOLIC BLOOD PRESSURE: 162 MMHG

## 2020-09-18 DIAGNOSIS — Z01.818 ENCOUNTER FOR OTHER PREPROCEDURAL EXAMINATION: ICD-10-CM

## 2020-09-18 DIAGNOSIS — H26.9 UNSPECIFIED CATARACT: ICD-10-CM

## 2020-09-18 PROCEDURE — 99213 OFFICE O/P EST LOW 20 MIN: CPT

## 2020-09-18 PROCEDURE — 93000 ELECTROCARDIOGRAM COMPLETE: CPT

## 2020-09-18 NOTE — PHYSICAL EXAM
[General Appearance - Well Developed] : well developed [Normal Appearance] : normal appearance [Well Groomed] : well groomed [General Appearance - Well Nourished] : well nourished [No Deformities] : no deformities [General Appearance - In No Acute Distress] : no acute distress [Normal Conjunctiva] : the conjunctiva exhibited no abnormalities [Eyelids - No Xanthelasma] : the eyelids demonstrated no xanthelasmas [Normal Oral Mucosa] : normal oral mucosa [No Oral Pallor] : no oral pallor [No Oral Cyanosis] : no oral cyanosis [Normal Jugular Venous A Waves Present] : normal jugular venous A waves present [Normal Jugular Venous V Waves Present] : normal jugular venous V waves present [No Jugular Venous Olvera A Waves] : no jugular venous olvera A waves [Respiration, Rhythm And Depth] : normal respiratory rhythm and effort [Exaggerated Use Of Accessory Muscles For Inspiration] : no accessory muscle use [Auscultation Breath Sounds / Voice Sounds] : lungs were clear to auscultation bilaterally [Heart Rate And Rhythm] : heart rate and rhythm were normal [Heart Sounds] : normal S1 and S2 [Murmurs] : no murmurs present [Abdomen Soft] : soft [Abdomen Tenderness] : non-tender [Abdomen Mass (___ Cm)] : no abdominal mass palpated [Abnormal Walk] : normal gait [Gait - Sufficient For Exercise Testing] : the gait was sufficient for exercise testing [Nail Clubbing] : no clubbing of the fingernails [Cyanosis, Localized] : no localized cyanosis [Petechial Hemorrhages (___cm)] : no petechial hemorrhages [Skin Color & Pigmentation] : normal skin color and pigmentation [] : no rash [No Venous Stasis] : no venous stasis [Skin Lesions] : no skin lesions [No Skin Ulcers] : no skin ulcer [No Xanthoma] : no  xanthoma was observed [Oriented To Time, Place, And Person] : oriented to person, place, and time [Affect] : the affect was normal [Mood] : the mood was normal [No Anxiety] : not feeling anxious

## 2020-10-19 ENCOUNTER — APPOINTMENT (OUTPATIENT)
Dept: ENDOCRINOLOGY | Facility: CLINIC | Age: 70
End: 2020-10-19

## 2020-10-19 ENCOUNTER — APPOINTMENT (OUTPATIENT)
Dept: CARDIOLOGY | Facility: CLINIC | Age: 70
End: 2020-10-19

## 2021-01-11 ENCOUNTER — APPOINTMENT (OUTPATIENT)
Dept: ENDOCRINOLOGY | Facility: CLINIC | Age: 71
End: 2021-01-11
Payer: MEDICARE

## 2021-01-11 ENCOUNTER — NON-APPOINTMENT (OUTPATIENT)
Age: 71
End: 2021-01-11

## 2021-01-11 ENCOUNTER — APPOINTMENT (OUTPATIENT)
Dept: CARDIOLOGY | Facility: CLINIC | Age: 71
End: 2021-01-11
Payer: MEDICARE

## 2021-01-11 VITALS
BODY MASS INDEX: 26.22 KG/M2 | SYSTOLIC BLOOD PRESSURE: 140 MMHG | DIASTOLIC BLOOD PRESSURE: 80 MMHG | WEIGHT: 148 LBS | OXYGEN SATURATION: 98 % | HEIGHT: 63 IN | TEMPERATURE: 98.7 F | HEART RATE: 96 BPM

## 2021-01-11 VITALS
WEIGHT: 148 LBS | OXYGEN SATURATION: 98 % | HEIGHT: 63 IN | HEART RATE: 115 BPM | SYSTOLIC BLOOD PRESSURE: 158 MMHG | BODY MASS INDEX: 26.22 KG/M2 | TEMPERATURE: 98.2 F | DIASTOLIC BLOOD PRESSURE: 82 MMHG

## 2021-01-11 VITALS — SYSTOLIC BLOOD PRESSURE: 132 MMHG | DIASTOLIC BLOOD PRESSURE: 78 MMHG

## 2021-01-11 PROCEDURE — 99072 ADDL SUPL MATRL&STAF TM PHE: CPT

## 2021-01-11 PROCEDURE — G0008: CPT

## 2021-01-11 PROCEDURE — 90662 IIV NO PRSV INCREASED AG IM: CPT

## 2021-01-11 PROCEDURE — 99214 OFFICE O/P EST MOD 30 MIN: CPT | Mod: 25

## 2021-01-11 PROCEDURE — 93000 ELECTROCARDIOGRAM COMPLETE: CPT

## 2021-01-11 PROCEDURE — 82962 GLUCOSE BLOOD TEST: CPT

## 2021-01-11 PROCEDURE — 99214 OFFICE O/P EST MOD 30 MIN: CPT

## 2021-01-11 PROCEDURE — 83036 HEMOGLOBIN GLYCOSYLATED A1C: CPT | Mod: QW

## 2021-01-11 NOTE — HISTORY OF PRESENT ILLNESS
[FreeTextEntry1] : Hair is a 69yo male with PMH anemia, DM2, HLD, and lumbar disc disease presents today for routine follow-up. Patient states he is feeling well today and has no issues or concerns for today. Patient denies chest pain,  , palpitations, dizziness, vision changes, n/v, abdominal pain, changes in bowel/bladder habits,  or appetite. pt with rare dyspnea pt with ocassional back pain

## 2021-01-11 NOTE — HISTORY OF PRESENT ILLNESS
[FreeTextEntry1] : Mr. HAYS is a 70 year year old  male who  returns today   in follow up with regard to a history of type 2 diabetes mellitus.  The Diabetes has been present for over 5 years.There is no known history of retinopathy, nephropathy. He  too denies any history of neuropathy but notes occasional tingling in gutierrez feet. Current dm medication include   Metformin 500 mg one in Am and one in PM.   HGM of late has shown values to be running  130's in the am mostly.     There has been no significant hypoglycemia.  He  denies any chest pain, sob, neurologic or ophthalmologic complaints. He  too denies any new podiatric concerns. He  is up to date with his ophthalmologic visits without hx of retinopathy.\par Additional medical history includes that of  htn, and hyperlipidemia. Is on losartan and Pravastatin and Ezetimibe.  Denies muscle aches.\par \par \par \par \par \par \par

## 2021-01-20 ENCOUNTER — APPOINTMENT (OUTPATIENT)
Dept: CARDIOLOGY | Facility: CLINIC | Age: 71
End: 2021-01-20
Payer: MEDICARE

## 2021-01-20 PROCEDURE — 99072 ADDL SUPL MATRL&STAF TM PHE: CPT

## 2021-01-20 PROCEDURE — A9500: CPT

## 2021-01-20 PROCEDURE — 78452 HT MUSCLE IMAGE SPECT MULT: CPT

## 2021-01-20 PROCEDURE — 93306 TTE W/DOPPLER COMPLETE: CPT

## 2021-01-20 PROCEDURE — 93015 CV STRESS TEST SUPVJ I&R: CPT

## 2021-01-20 RX ORDER — REGADENOSON 0.08 MG/ML
0.4 INJECTION, SOLUTION INTRAVENOUS
Qty: 1 | Refills: 0 | Status: COMPLETED | OUTPATIENT
Start: 2021-01-20

## 2021-01-20 RX ADMIN — REGADENOSON 5 MG/5ML: 0.08 INJECTION, SOLUTION INTRAVENOUS at 00:00

## 2021-01-21 ENCOUNTER — NON-APPOINTMENT (OUTPATIENT)
Age: 71
End: 2021-01-21

## 2021-02-18 ENCOUNTER — NON-APPOINTMENT (OUTPATIENT)
Age: 71
End: 2021-02-18

## 2021-02-21 LAB
25(OH)D3 SERPL-MCNC: 77.6 NG/ML
ALBUMIN SERPL ELPH-MCNC: 4.5 G/DL
ALP BLD-CCNC: 67 U/L
ALT SERPL-CCNC: 14 U/L
ANION GAP SERPL CALC-SCNC: 14 MMOL/L
AST SERPL-CCNC: 20 U/L
BILIRUB SERPL-MCNC: 1 MG/DL
BUN SERPL-MCNC: 21 MG/DL
CALCIUM SERPL-MCNC: 10 MG/DL
CHLORIDE SERPL-SCNC: 100 MMOL/L
CHOLEST SERPL-MCNC: 163 MG/DL
CO2 SERPL-SCNC: 24 MMOL/L
CREAT SERPL-MCNC: 1.32 MG/DL
CREAT SPEC-SCNC: 229 MG/DL
ESTIMATED AVERAGE GLUCOSE: 180 MG/DL
FRUCTOSAMINE SERPL-MCNC: 334 UMOL/L
GLUCOSE BLDC GLUCOMTR-MCNC: 149
GLUCOSE SERPL-MCNC: 132 MG/DL
GLYCOMARK.: 3.5 UG/ML
HBA1C MFR BLD HPLC: 7.6
HBA1C MFR BLD HPLC: 7.9 %
HDLC SERPL-MCNC: 48 MG/DL
LDLC SERPL DIRECT ASSAY-MCNC: 87 MG/DL
MICROALBUMIN 24H UR DL<=1MG/L-MCNC: 6.2 MG/DL
MICROALBUMIN/CREAT 24H UR-RTO: 27 MG/G
POTASSIUM SERPL-SCNC: 5 MMOL/L
PROT SERPL-MCNC: 7.8 G/DL
SODIUM SERPL-SCNC: 138 MMOL/L
T3FREE SERPL-MCNC: 3.14 PG/ML
T4 FREE SERPL-MCNC: 1.4 NG/DL
TRIGL SERPL-MCNC: 175 MG/DL
TSH SERPL-ACNC: 1.75 UIU/ML

## 2021-04-13 ENCOUNTER — APPOINTMENT (OUTPATIENT)
Dept: CARDIOLOGY | Facility: CLINIC | Age: 71
End: 2021-04-13
Payer: MEDICARE

## 2021-04-13 ENCOUNTER — APPOINTMENT (OUTPATIENT)
Dept: ENDOCRINOLOGY | Facility: CLINIC | Age: 71
End: 2021-04-13
Payer: MEDICARE

## 2021-04-13 ENCOUNTER — NON-APPOINTMENT (OUTPATIENT)
Age: 71
End: 2021-04-13

## 2021-04-13 VITALS
SYSTOLIC BLOOD PRESSURE: 125 MMHG | OXYGEN SATURATION: 98 % | BODY MASS INDEX: 26.4 KG/M2 | WEIGHT: 149 LBS | TEMPERATURE: 98.5 F | HEIGHT: 63 IN | HEART RATE: 97 BPM | DIASTOLIC BLOOD PRESSURE: 80 MMHG

## 2021-04-13 DIAGNOSIS — R82.90 UNSPECIFIED ABNORMAL FINDINGS IN URINE: ICD-10-CM

## 2021-04-13 PROCEDURE — 83036 HEMOGLOBIN GLYCOSYLATED A1C: CPT | Mod: QW

## 2021-04-13 PROCEDURE — 95250 CONT GLUC MNTR PHYS/QHP EQP: CPT

## 2021-04-13 PROCEDURE — 36415 COLL VENOUS BLD VENIPUNCTURE: CPT

## 2021-04-13 PROCEDURE — 93000 ELECTROCARDIOGRAM COMPLETE: CPT

## 2021-04-13 PROCEDURE — 99072 ADDL SUPL MATRL&STAF TM PHE: CPT

## 2021-04-13 PROCEDURE — 99214 OFFICE O/P EST MOD 30 MIN: CPT

## 2021-04-13 PROCEDURE — 99214 OFFICE O/P EST MOD 30 MIN: CPT | Mod: 25

## 2021-04-13 NOTE — HISTORY OF PRESENT ILLNESS
[FreeTextEntry1] : Hair is a 69yo male with PMH anemia, DM2, HLD, and lumbar disc disease presents today for routine follow-up. Patient states he is feeling well today and has no issues or concerns for today. Patient denies chest pain, , palpitations, dizziness, vision changes, n/v, abdominal pain, changes in bowel/bladder habits, or appetite. Patient also reports continued back pain, states he did not see neurologist as previously advised for pain. Also reports that he received first COVID vaccine and is scheduled for 2nd dose today.

## 2021-04-13 NOTE — HISTORY OF PRESENT ILLNESS
[FreeTextEntry1] : Mr. HAYS is a 70 year old male who returns today in follow up with regard to a history of type 2 diabetes mellitus. The Diabetes has been present for over 5 years.There is no known history of retinopathy, nephropathy. He too denies any history of neuropathy but notes occasional tingling in gutierrez feet. Current dm medication include Metformin 500 mg one in Am and one in PM. HGM of late has shown values to be running under 150s in the am. He does not test any other time. There has been no significant hypoglycemia. He denies any chest pain, sob, neurologic or ophthalmologic complaints. He too denies any new podiatric concerns. He is up to date with his ophthalmologic visits without hx of retinopathy.\par POCT A1c returned today at 8.4 %  \par POCT glucose returned today at  163  mg/dL\par \par Additional medical history includes that of hypertension, and hyperlipidemia. Is on losartan and Pravastatin and Ezetimibe.\par He does report lower extremity stiffness. Admits to not walking regularly. \par Scheduled to receive second dose of Pfizer vaccine today. \par

## 2021-04-28 ENCOUNTER — APPOINTMENT (OUTPATIENT)
Dept: ENDOCRINOLOGY | Facility: CLINIC | Age: 71
End: 2021-04-28
Payer: MEDICARE

## 2021-04-28 ENCOUNTER — APPOINTMENT (OUTPATIENT)
Dept: CARDIOLOGY | Facility: CLINIC | Age: 71
End: 2021-04-28
Payer: MEDICARE

## 2021-04-28 VITALS
DIASTOLIC BLOOD PRESSURE: 80 MMHG | HEART RATE: 78 BPM | WEIGHT: 149 LBS | OXYGEN SATURATION: 99 % | BODY MASS INDEX: 26.39 KG/M2 | TEMPERATURE: 98.6 F | SYSTOLIC BLOOD PRESSURE: 144 MMHG | RESPIRATION RATE: 16 BRPM

## 2021-04-28 DIAGNOSIS — R79.89 OTHER SPECIFIED ABNORMAL FINDINGS OF BLOOD CHEMISTRY: ICD-10-CM

## 2021-04-28 DIAGNOSIS — D72.829 ELEVATED WHITE BLOOD CELL COUNT, UNSPECIFIED: ICD-10-CM

## 2021-04-28 LAB
25(OH)D3 SERPL-MCNC: 63.3 NG/ML
ALBUMIN SERPL ELPH-MCNC: 4.4 G/DL
ALP BLD-CCNC: 60 U/L
ALT SERPL-CCNC: 10 U/L
ANION GAP SERPL CALC-SCNC: 12 MMOL/L
AST SERPL-CCNC: 15 U/L
BILIRUB SERPL-MCNC: 0.8 MG/DL
BUN SERPL-MCNC: 23 MG/DL
CALCIUM SERPL-MCNC: 9.9 MG/DL
CHLORIDE SERPL-SCNC: 101 MMOL/L
CHOLEST SERPL-MCNC: 136 MG/DL
CO2 SERPL-SCNC: 26 MMOL/L
CREAT SERPL-MCNC: 1.18 MG/DL
CREAT SPEC-SCNC: 203 MG/DL
ESTIMATED AVERAGE GLUCOSE: 200 MG/DL
FRUCTOSAMINE SERPL-MCNC: 335 UMOL/L
GLUCOSE BLDC GLUCOMTR-MCNC: 163
GLUCOSE SERPL-MCNC: 148 MG/DL
GLYCOMARK.: 3.3 UG/ML
HBA1C MFR BLD HPLC: 8.4
HBA1C MFR BLD HPLC: 8.6 %
HDLC SERPL-MCNC: 51 MG/DL
LDLC SERPL DIRECT ASSAY-MCNC: 71 MG/DL
MICROALBUMIN 24H UR DL<=1MG/L-MCNC: 6.6 MG/DL
MICROALBUMIN/CREAT 24H UR-RTO: 33 MG/G
POTASSIUM SERPL-SCNC: 5.2 MMOL/L
PROT SERPL-MCNC: 7.6 G/DL
SODIUM SERPL-SCNC: 139 MMOL/L
T3FREE SERPL-MCNC: 2.81 PG/ML
T4 FREE SERPL-MCNC: 1.2 NG/DL
TRIGL SERPL-MCNC: 111 MG/DL
TSH SERPL-ACNC: 2.37 UIU/ML

## 2021-04-28 PROCEDURE — 99214 OFFICE O/P EST MOD 30 MIN: CPT

## 2021-04-28 PROCEDURE — 93925 LOWER EXTREMITY STUDY: CPT

## 2021-04-28 PROCEDURE — 99072 ADDL SUPL MATRL&STAF TM PHE: CPT

## 2021-04-28 PROCEDURE — 93880 EXTRACRANIAL BILAT STUDY: CPT

## 2021-04-28 NOTE — HISTORY OF PRESENT ILLNESS
[FreeTextEntry1] : Mr. HAYS is a 70 year old male who returns today in follow up with regard to a history of type 2 diabetes mellitus. The Diabetes has been present for over 5 years. There is no known history of retinopathy, nephropathy. He  too denies any history of neuropathy but notes occasional tingling in gutierrez feet. Current dm medication include   Metformin 500 mg one in Am and one in PM.   HGM via kayleigh device from 04/13 - 04/27 has shown values to be running in the 150s-180s, sometimes up to mid 200s post-breakfast despite having a light breakfast, and up to 200- 300s post-dinner. There has been no significant hypoglycemia.  He  denies any chest pain, sob, neurologic or ophthalmologic complaints. He  too denies any new podiatric concerns. He  is up to date with his ophthalmologic visits without hx of retinopathy.\par A1c from 04/13/2021 returned at 8.6 % \par Pt has rice and roti for dinner. \par \par Additional medical history includes that of  htn, and hyperlipidemia. Is on losartan and Pravastatin and Ezetimibe.  Denies muscle \par aches.\par Carotid study done today revealed ICA 50 - 69% plaque buildup. \par WBC returned at 12.42, platelets returned at 466.

## 2021-06-10 ENCOUNTER — OUTPATIENT (OUTPATIENT)
Dept: OUTPATIENT SERVICES | Facility: HOSPITAL | Age: 71
LOS: 1 days | Discharge: HOME | End: 2021-06-10

## 2021-06-10 DIAGNOSIS — M48.8X2 OTHER SPECIFIED SPONDYLOPATHIES, CERVICAL REGION: Chronic | ICD-10-CM

## 2021-06-10 DIAGNOSIS — D47.3 ESSENTIAL (HEMORRHAGIC) THROMBOCYTHEMIA: ICD-10-CM

## 2021-06-14 ENCOUNTER — RESULT REVIEW (OUTPATIENT)
Age: 71
End: 2021-06-14

## 2021-06-14 ENCOUNTER — APPOINTMENT (OUTPATIENT)
Dept: HEMATOLOGY ONCOLOGY | Facility: CLINIC | Age: 71
End: 2021-06-14
Payer: MEDICARE

## 2021-06-14 VITALS
HEART RATE: 80 BPM | BODY MASS INDEX: 24.02 KG/M2 | SYSTOLIC BLOOD PRESSURE: 156 MMHG | WEIGHT: 147.71 LBS | RESPIRATION RATE: 16 BRPM | OXYGEN SATURATION: 99 % | DIASTOLIC BLOOD PRESSURE: 90 MMHG | TEMPERATURE: 97.2 F | HEIGHT: 65.75 IN

## 2021-06-14 LAB
BASOPHILS # BLD AUTO: 0.04 K/UL — SIGNIFICANT CHANGE UP (ref 0–0.2)
BASOPHILS NFR BLD AUTO: 0.4 % — SIGNIFICANT CHANGE UP (ref 0–2)
EOSINOPHIL # BLD AUTO: 0.49 K/UL — SIGNIFICANT CHANGE UP (ref 0–0.5)
EOSINOPHIL NFR BLD AUTO: 5.4 % — SIGNIFICANT CHANGE UP (ref 0–6)
HCT VFR BLD CALC: 43 % — SIGNIFICANT CHANGE UP (ref 39–50)
HGB BLD-MCNC: 13.9 G/DL — SIGNIFICANT CHANGE UP (ref 13–17)
IMM GRANULOCYTES NFR BLD AUTO: 0.3 % — SIGNIFICANT CHANGE UP (ref 0–1.5)
LYMPHOCYTES # BLD AUTO: 2.64 K/UL — SIGNIFICANT CHANGE UP (ref 1–3.3)
LYMPHOCYTES # BLD AUTO: 29.2 % — SIGNIFICANT CHANGE UP (ref 13–44)
MCHC RBC-ENTMCNC: 28.7 PG — SIGNIFICANT CHANGE UP (ref 27–34)
MCHC RBC-ENTMCNC: 32.3 G/DL — SIGNIFICANT CHANGE UP (ref 32–36)
MCV RBC AUTO: 88.7 FL — SIGNIFICANT CHANGE UP (ref 80–100)
MONOCYTES # BLD AUTO: 0.74 K/UL — SIGNIFICANT CHANGE UP (ref 0–0.9)
MONOCYTES NFR BLD AUTO: 8.2 % — SIGNIFICANT CHANGE UP (ref 2–14)
NEUTROPHILS # BLD AUTO: 5.1 K/UL — SIGNIFICANT CHANGE UP (ref 1.8–7.4)
NEUTROPHILS NFR BLD AUTO: 56.5 % — SIGNIFICANT CHANGE UP (ref 43–77)
NRBC # BLD: 0 /100 WBCS — SIGNIFICANT CHANGE UP (ref 0–0)
PLATELET # BLD AUTO: 406 K/UL — HIGH (ref 150–400)
RBC # BLD: 4.85 M/UL — SIGNIFICANT CHANGE UP (ref 4.2–5.8)
RBC # FLD: 12.3 % — SIGNIFICANT CHANGE UP (ref 10.3–14.5)
RETICS #: 78.6 K/UL — SIGNIFICANT CHANGE UP (ref 25–125)
RETICS/RBC NFR: 1.6 % — SIGNIFICANT CHANGE UP (ref 0.5–2.5)
WBC # BLD: 9.04 K/UL — SIGNIFICANT CHANGE UP (ref 3.8–10.5)
WBC # FLD AUTO: 9.04 K/UL — SIGNIFICANT CHANGE UP (ref 3.8–10.5)

## 2021-06-14 PROCEDURE — 99204 OFFICE O/P NEW MOD 45 MIN: CPT

## 2021-06-14 RX ORDER — EZETIMIBE 10 MG/1
10 TABLET ORAL
Qty: 90 | Refills: 2 | Status: DISCONTINUED | COMMUNITY
Start: 2019-09-24 | End: 2021-06-14

## 2021-06-14 RX ORDER — SODIUM POLYSTYRENE SULFONATE 15 G/60ML
15 SUSPENSION ORAL; RECTAL
Qty: 1 | Refills: 0 | Status: ACTIVE | COMMUNITY
Start: 2021-06-14 | End: 1900-01-01

## 2021-06-14 RX ORDER — REPAGLINIDE 1 MG/1
1 TABLET ORAL
Qty: 90 | Refills: 3 | Status: DISCONTINUED | COMMUNITY
Start: 2021-04-28 | End: 2021-06-14

## 2021-06-16 LAB
ALBUMIN SERPL ELPH-MCNC: 4.7 G/DL
ALP BLD-CCNC: 66 U/L
ALT SERPL-CCNC: 12 U/L
ANION GAP SERPL CALC-SCNC: 10 MMOL/L
AST SERPL-CCNC: 18 U/L
BILIRUB SERPL-MCNC: 1.1 MG/DL
BUN SERPL-MCNC: 21 MG/DL
CALCIUM SERPL-MCNC: 10 MG/DL
CHLORIDE SERPL-SCNC: 104 MMOL/L
CO2 SERPL-SCNC: 27 MMOL/L
CREAT SERPL-MCNC: 1.49 MG/DL
GLUCOSE SERPL-MCNC: 150 MG/DL
POTASSIUM SERPL-SCNC: 6.3 MMOL/L
PROT SERPL-MCNC: 7.2 G/DL
SODIUM SERPL-SCNC: 141 MMOL/L

## 2021-06-17 ENCOUNTER — RESULT REVIEW (OUTPATIENT)
Age: 71
End: 2021-06-17

## 2021-06-17 ENCOUNTER — APPOINTMENT (OUTPATIENT)
Dept: HEMATOLOGY ONCOLOGY | Facility: CLINIC | Age: 71
End: 2021-06-17

## 2021-06-17 LAB
BASOPHILS # BLD AUTO: 0.04 K/UL — SIGNIFICANT CHANGE UP (ref 0–0.2)
BASOPHILS NFR BLD AUTO: 0.5 % — SIGNIFICANT CHANGE UP (ref 0–2)
EOSINOPHIL # BLD AUTO: 0.55 K/UL — HIGH (ref 0–0.5)
EOSINOPHIL NFR BLD AUTO: 6.5 % — HIGH (ref 0–6)
HCT VFR BLD CALC: 40.2 % — SIGNIFICANT CHANGE UP (ref 39–50)
HGB BLD-MCNC: 13.2 G/DL — SIGNIFICANT CHANGE UP (ref 13–17)
IMM GRANULOCYTES NFR BLD AUTO: 0.5 % — SIGNIFICANT CHANGE UP (ref 0–1.5)
LYMPHOCYTES # BLD AUTO: 2.63 K/UL — SIGNIFICANT CHANGE UP (ref 1–3.3)
LYMPHOCYTES # BLD AUTO: 31.2 % — SIGNIFICANT CHANGE UP (ref 13–44)
MCHC RBC-ENTMCNC: 28.7 PG — SIGNIFICANT CHANGE UP (ref 27–34)
MCHC RBC-ENTMCNC: 32.8 G/DL — SIGNIFICANT CHANGE UP (ref 32–36)
MCV RBC AUTO: 87.4 FL — SIGNIFICANT CHANGE UP (ref 80–100)
MONOCYTES # BLD AUTO: 0.73 K/UL — SIGNIFICANT CHANGE UP (ref 0–0.9)
MONOCYTES NFR BLD AUTO: 8.6 % — SIGNIFICANT CHANGE UP (ref 2–14)
NEUTROPHILS # BLD AUTO: 4.45 K/UL — SIGNIFICANT CHANGE UP (ref 1.8–7.4)
NEUTROPHILS NFR BLD AUTO: 52.7 % — SIGNIFICANT CHANGE UP (ref 43–77)
NRBC # BLD: 0 /100 WBCS — SIGNIFICANT CHANGE UP (ref 0–0)
PLATELET # BLD AUTO: 383 K/UL — SIGNIFICANT CHANGE UP (ref 150–400)
RBC # BLD: 4.6 M/UL — SIGNIFICANT CHANGE UP (ref 4.2–5.8)
RBC # FLD: 12.1 % — SIGNIFICANT CHANGE UP (ref 10.3–14.5)
WBC # BLD: 8.44 K/UL — SIGNIFICANT CHANGE UP (ref 3.8–10.5)
WBC # FLD AUTO: 8.44 K/UL — SIGNIFICANT CHANGE UP (ref 3.8–10.5)

## 2021-06-23 LAB
ALBUMIN SERPL ELPH-MCNC: 4.4 G/DL
ALP BLD-CCNC: 61 U/L
ALT SERPL-CCNC: 11 U/L
ANION GAP SERPL CALC-SCNC: 10 MMOL/L
AST SERPL-CCNC: 16 U/L
BILIRUB SERPL-MCNC: 0.8 MG/DL
BUN SERPL-MCNC: 16 MG/DL
CALCIUM SERPL-MCNC: 9.2 MG/DL
CHLORIDE SERPL-SCNC: 100 MMOL/L
CO2 SERPL-SCNC: 28 MMOL/L
CREAT SERPL-MCNC: 1.27 MG/DL
GLUCOSE SERPL-MCNC: 147 MG/DL
JAK2RLX: NORMAL
POTASSIUM SERPL-SCNC: 4.8 MMOL/L
PROT SERPL-MCNC: 7.1 G/DL
SODIUM SERPL-SCNC: 137 MMOL/L
T(9;22)(ABL1,BCR)/CONTROL BLD/T: NORMAL

## 2021-06-23 NOTE — ASSESSMENT
[FreeTextEntry1] : This is a 70 year old man with a history of diabetes, and hyperlipidemia, patient presents for the evaluation of a leukocytosis, no localizing symptoms of infection, patient did have a cervical fusion in 3/3/14. No complications.  \par ALC 3,330 Diff normal throughout the past 2 years.  Has been elevated to this extent since 2019.  Most recent WBC 12.4k/ul in association with a normal diff, platelets 466, has been in the 315-480's range for 2 years.  \par Will run flow cytometry rule out clonal lymphocyte disorder.  \par \par Peripheral smear review\par  6% eosinophils, 8% monocytes, 35% lymphocytes, 51% segmented neutrophils.  Platelets adequate, normal morphology. RBC normocytic normochromic, normal morphology.  \par \par Addendum 6/16/21\par Spoke to patient re: labs, Potassium was 6.3, this was already addressed with the help of my nurse practitioner on 6/14, patient has been compliant with the Kayexalate. Asked him to follow up with his  PCP/cardiologist Dr. Gibbons, it may be time to have him transition off of losartan given hyperkalemia.  Will recheck CMP tomorrow for potassium.  His WBC returned to normal at 8k/ul and flow cytometry does not show any abnormalities.  Reactive leukocytosis.  \par \par Addendum 6/23/21 the completed results of the LU 2 and the BCR/ABL testing returned now, was negative for mutation. Flow cytometry unremarkable.  Patient likely had an inflammatory reaction causing the thrombocytosis and leukocytosis which has since resolved.  Does not require further hematologic follow up.  Can return as needed if CBC becomes abnormal again.

## 2021-06-23 NOTE — HISTORY OF PRESENT ILLNESS
[de-identified] : This is a 70 year old man with a history of diabetes, and hyperlipidemia, patient presents for the evaluation of a leukocytosis, no localizing symptms of infection, patient did have a cervical fusion in 3/3/14. No complications.  \par \par ALC 3,330 Diff normal throughout the past 2 years.  Has been elevated to this extent since 2019.  Most recent WBC 12.4k/ul with a normal diff, platelets 466, has been in the 315-480's range for 2 years.  \par Had a normal PSA in January, due for colonoscopy.  Has been referred for this already.

## 2021-06-24 ENCOUNTER — APPOINTMENT (OUTPATIENT)
Dept: ENDOCRINOLOGY | Facility: CLINIC | Age: 71
End: 2021-06-24
Payer: MEDICARE

## 2021-06-24 VITALS
OXYGEN SATURATION: 97 % | HEART RATE: 101 BPM | HEIGHT: 65 IN | BODY MASS INDEX: 24.16 KG/M2 | DIASTOLIC BLOOD PRESSURE: 79 MMHG | WEIGHT: 145 LBS | SYSTOLIC BLOOD PRESSURE: 150 MMHG

## 2021-06-24 DIAGNOSIS — R53.83 OTHER FATIGUE: ICD-10-CM

## 2021-06-24 DIAGNOSIS — E87.5 HYPERKALEMIA: ICD-10-CM

## 2021-06-24 PROCEDURE — 82962 GLUCOSE BLOOD TEST: CPT

## 2021-06-24 PROCEDURE — 36415 COLL VENOUS BLD VENIPUNCTURE: CPT

## 2021-06-24 PROCEDURE — 99214 OFFICE O/P EST MOD 30 MIN: CPT | Mod: 25

## 2021-08-13 LAB
APPEARANCE: CLEAR
APPEARANCE: CLEAR
BACTERIA: NEGATIVE
BACTERIA: NEGATIVE
BASOPHILS # BLD AUTO: 0.05 K/UL
BASOPHILS # BLD AUTO: 0.07 K/UL
BASOPHILS NFR BLD AUTO: 0.5 %
BASOPHILS NFR BLD AUTO: 0.6 %
BILIRUBIN URINE: NEGATIVE
BILIRUBIN URINE: NEGATIVE
BLOOD URINE: NEGATIVE
BLOOD URINE: NEGATIVE
CK SERPL-CCNC: 78 U/L
CK SERPL-CCNC: 97 U/L
COLOR: YELLOW
COLOR: YELLOW
EOSINOPHIL # BLD AUTO: 0.51 K/UL
EOSINOPHIL # BLD AUTO: 0.58 K/UL
EOSINOPHIL NFR BLD AUTO: 4.7 %
EOSINOPHIL NFR BLD AUTO: 4.8 %
FERRITIN SERPL-MCNC: 20 NG/ML
FERRITIN SERPL-MCNC: 33 NG/ML
FOLATE SERPL-MCNC: 14.9 NG/ML
FOLATE SERPL-MCNC: >20 NG/ML
GLUCOSE QUALITATIVE U: NEGATIVE
GLUCOSE QUALITATIVE U: NEGATIVE
HCT VFR BLD CALC: 43.4 %
HCT VFR BLD CALC: 46.4 %
HGB BLD-MCNC: 13.7 G/DL
HGB BLD-MCNC: 14.4 G/DL
HYALINE CASTS: 0 /LPF
HYALINE CASTS: 1 /LPF
IMM GRANULOCYTES NFR BLD AUTO: 0.3 %
IMM GRANULOCYTES NFR BLD AUTO: 0.3 %
IRON SATN MFR SERPL: 13 %
IRON SATN MFR SERPL: 29 %
IRON SERPL-MCNC: 120 UG/DL
IRON SERPL-MCNC: 57 UG/DL
KETONES URINE: NEGATIVE
KETONES URINE: NEGATIVE
LEUKOCYTE ESTERASE URINE: NEGATIVE
LEUKOCYTE ESTERASE URINE: NEGATIVE
LYMPHOCYTES # BLD AUTO: 3.04 K/UL
LYMPHOCYTES # BLD AUTO: 3.33 K/UL
LYMPHOCYTES NFR BLD AUTO: 26.8 %
LYMPHOCYTES NFR BLD AUTO: 28.5 %
MAN DIFF?: NORMAL
MAN DIFF?: NORMAL
MCHC RBC-ENTMCNC: 27.9 PG
MCHC RBC-ENTMCNC: 28.1 PG
MCHC RBC-ENTMCNC: 31 GM/DL
MCHC RBC-ENTMCNC: 31.6 GM/DL
MCV RBC AUTO: 88.4 FL
MCV RBC AUTO: 90.6 FL
MICROSCOPIC-UA: NORMAL
MICROSCOPIC-UA: NORMAL
MONOCYTES # BLD AUTO: 0.78 K/UL
MONOCYTES # BLD AUTO: 0.94 K/UL
MONOCYTES NFR BLD AUTO: 7.3 %
MONOCYTES NFR BLD AUTO: 7.6 %
NEUTROPHILS # BLD AUTO: 6.24 K/UL
NEUTROPHILS # BLD AUTO: 7.46 K/UL
NEUTROPHILS NFR BLD AUTO: 58.6 %
NEUTROPHILS NFR BLD AUTO: 60 %
NITRITE URINE: NEGATIVE
NITRITE URINE: NEGATIVE
PH URINE: 6
PH URINE: 6.5
PLATELET # BLD AUTO: 432 K/UL
PLATELET # BLD AUTO: 466 K/UL
PROTEIN URINE: ABNORMAL
PROTEIN URINE: ABNORMAL
PSA SERPL-MCNC: 1.68 NG/ML
RBC # BLD: 4.91 M/UL
RBC # BLD: 5.12 M/UL
RBC # FLD: 11.8 %
RBC # FLD: 12 %
RED BLOOD CELLS URINE: 1 /HPF
RED BLOOD CELLS URINE: 2 /HPF
SPECIFIC GRAVITY URINE: 1.03
SPECIFIC GRAVITY URINE: 1.03
SQUAMOUS EPITHELIAL CELLS: 0 /HPF
SQUAMOUS EPITHELIAL CELLS: 0 /HPF
TIBC SERPL-MCNC: 407 UG/DL
TIBC SERPL-MCNC: 424 UG/DL
UIBC SERPL-MCNC: 287 UG/DL
UIBC SERPL-MCNC: 367 UG/DL
UROBILINOGEN URINE: NORMAL
UROBILINOGEN URINE: NORMAL
VIT B12 SERPL-MCNC: 449 PG/ML
VIT B12 SERPL-MCNC: 604 PG/ML
WBC # FLD AUTO: 10.65 K/UL
WBC # FLD AUTO: 12.42 K/UL
WHITE BLOOD CELLS URINE: 1 /HPF
WHITE BLOOD CELLS URINE: 1 /HPF

## 2021-08-17 ENCOUNTER — APPOINTMENT (OUTPATIENT)
Dept: ENDOCRINOLOGY | Facility: CLINIC | Age: 71
End: 2021-08-17

## 2021-08-17 ENCOUNTER — APPOINTMENT (OUTPATIENT)
Dept: CARDIOLOGY | Facility: CLINIC | Age: 71
End: 2021-08-17
Payer: MEDICARE

## 2021-08-17 ENCOUNTER — NON-APPOINTMENT (OUTPATIENT)
Age: 71
End: 2021-08-17

## 2021-08-17 VITALS
SYSTOLIC BLOOD PRESSURE: 158 MMHG | HEART RATE: 102 BPM | WEIGHT: 142 LBS | OXYGEN SATURATION: 98 % | HEIGHT: 65 IN | TEMPERATURE: 98.7 F | DIASTOLIC BLOOD PRESSURE: 80 MMHG | BODY MASS INDEX: 23.66 KG/M2

## 2021-08-17 VITALS — SYSTOLIC BLOOD PRESSURE: 128 MMHG | DIASTOLIC BLOOD PRESSURE: 68 MMHG

## 2021-08-17 DIAGNOSIS — Z71.89 OTHER SPECIFIED COUNSELING: ICD-10-CM

## 2021-08-17 PROCEDURE — 93000 ELECTROCARDIOGRAM COMPLETE: CPT

## 2021-08-17 PROCEDURE — 99213 OFFICE O/P EST LOW 20 MIN: CPT

## 2021-08-17 NOTE — REVIEW OF SYSTEMS
[Negative] : Heme/Lymph [Feeling Fatigued] : feeling fatigued [FreeTextEntry9] : Stiffness to upper thighs

## 2021-08-17 NOTE — HISTORY OF PRESENT ILLNESS
[FreeTextEntry1] :  This is a 70 year old gentlemen with a PMH anemia, DM2, HTN, HLD, and lumbar disc disease presents today for routine follow-up. patient states that he was having difficulty getting in touch with Dr. Nissenbaum's office, and that he did not have the MRI that was ordered at the last visit. Patient states that he still has stiffness to the upper thighs. Patient was seen by Dr. andrade ([facility]) for evaluation of Leukocytosis. Patient denies dyspnea, palpitations, chest pain, nausea, vomiting, dizziness and lightheadedness.\par

## 2021-08-22 LAB
ANION GAP SERPL CALC-SCNC: 12 MMOL/L
BUN SERPL-MCNC: 14 MG/DL
CALCIUM SERPL-MCNC: 10.5 MG/DL
CHLORIDE SERPL-SCNC: 99 MMOL/L
CO2 SERPL-SCNC: 26 MMOL/L
CREAT SERPL-MCNC: 1.31 MG/DL
GLUCOSE BLDC GLUCOMTR-MCNC: 157
GLUCOSE SERPL-MCNC: 150 MG/DL
HBA1C MFR BLD HPLC: 7.4
POTASSIUM SERPL-SCNC: 5.1 MMOL/L
SODIUM SERPL-SCNC: 136 MMOL/L

## 2021-12-03 ENCOUNTER — APPOINTMENT (OUTPATIENT)
Dept: CARDIOLOGY | Facility: CLINIC | Age: 71
End: 2021-12-03
Payer: MEDICARE

## 2021-12-03 ENCOUNTER — APPOINTMENT (OUTPATIENT)
Dept: ENDOCRINOLOGY | Facility: CLINIC | Age: 71
End: 2021-12-03
Payer: MEDICARE

## 2021-12-03 VITALS
HEART RATE: 103 BPM | TEMPERATURE: 98.8 F | OXYGEN SATURATION: 98 % | WEIGHT: 145 LBS | BODY MASS INDEX: 24.16 KG/M2 | SYSTOLIC BLOOD PRESSURE: 138 MMHG | DIASTOLIC BLOOD PRESSURE: 72 MMHG | HEIGHT: 65 IN

## 2021-12-03 VITALS
TEMPERATURE: 98.8 F | HEIGHT: 65 IN | BODY MASS INDEX: 24.16 KG/M2 | OXYGEN SATURATION: 98 % | DIASTOLIC BLOOD PRESSURE: 82 MMHG | WEIGHT: 145 LBS | HEART RATE: 103 BPM | SYSTOLIC BLOOD PRESSURE: 150 MMHG

## 2021-12-03 VITALS — DIASTOLIC BLOOD PRESSURE: 72 MMHG | SYSTOLIC BLOOD PRESSURE: 138 MMHG

## 2021-12-03 PROBLEM — E87.5 HYPERKALEMIA: Status: ACTIVE | Noted: 2020-01-27

## 2021-12-03 PROBLEM — R53.83 FATIGUE: Status: ACTIVE | Noted: 2019-05-17

## 2021-12-03 LAB
GLUCOSE BLDC GLUCOMTR-MCNC: 113
HBA1C MFR BLD HPLC: 6.4

## 2021-12-03 PROCEDURE — 99214 OFFICE O/P EST MOD 30 MIN: CPT | Mod: 25

## 2021-12-03 PROCEDURE — 82962 GLUCOSE BLOOD TEST: CPT

## 2021-12-03 PROCEDURE — 90662 IIV NO PRSV INCREASED AG IM: CPT

## 2021-12-03 PROCEDURE — 99214 OFFICE O/P EST MOD 30 MIN: CPT

## 2021-12-03 PROCEDURE — 83036 HEMOGLOBIN GLYCOSYLATED A1C: CPT | Mod: QW

## 2021-12-03 PROCEDURE — 90471 IMMUNIZATION ADMIN: CPT

## 2021-12-03 RX ORDER — SITAGLIPTIN AND METFORMIN HYDROCHLORIDE 50; 1000 MG/1; MG/1
50-1000 TABLET, FILM COATED ORAL
Qty: 180 | Refills: 3 | Status: DISCONTINUED | COMMUNITY
Start: 2021-06-24 | End: 2021-12-03

## 2021-12-03 NOTE — PHYSICAL EXAM

## 2021-12-03 NOTE — PHYSICAL EXAM

## 2021-12-03 NOTE — REVIEW OF SYSTEMS
[Negative] : Heme/Lymph [Joint Pain] : no joint pain [Joint Swelling] : no joint swelling [Joint Stiffness] : no joint stiffness [Muscle Cramps] : no muscle cramps [Myalgia] : no myalgia [FreeTextEntry9] : Lower back pain and upper thigh

## 2021-12-03 NOTE — HISTORY OF PRESENT ILLNESS
[FreeTextEntry1] : Mr. HAYS is a 70 year old male who returns today in follow up with regard to a history of type 2 diabetes mellitus. The Diabetes has been present for over 5 years.There is no known history of retinopathy, nephropathy. He too denies any history of neuropathy but notes occasional tingling in gutierrez feet. Current dm medication include Metformin 500 mg two in Am and two in PM  for the last 2 months, but prior to that he was on Janumet 50/1000 mg bid. HGM of late has shown values to be running in the 120-140s. Only testing once daily in the AM. There has been no significant hypoglycemia. He denies any chest pain, sob, neurologic or ophthalmologic complaints. He too denies any new podiatric concerns. He is up to date with his ophthalmologic visits without hx of retinopathy.\par POCT A1c returned today at 6.4%  ; previously 7.4% on 06/24/2021.\par POCT glucose returned today at  113  mg/dL.\par Additional medical history includes that of htn, and hyperlipidemia. Is on losartan and Pravastatin and Ezetimibe. Denies muscle aches.\par Has met with hematologist Dr. Gualberto Godinez- potassium had previously been elevated but was normal upon repeat of blood work. May be started on losartan.

## 2021-12-03 NOTE — HISTORY OF PRESENT ILLNESS
[FreeTextEntry1] : This is a 70 year old gentlemen with a PMH of anemia, DM2, HTN, HLD, Vitamin D Deficiency and lumbar disc disease presents today for follow up. Patient was last seen in our office on August 17, 2021. Patient states that he is feeling OK and has no major complaints at this time. Patient states that he did not have MRI that was ordered and was not seen by Dr. Nissenbaum. He still reports some pain to the lower back. Patient denies dyspnea, palpitations, chest pain, nausea, vomiting, dizziness and lightheadedness.\par

## 2021-12-29 LAB — T3FREE SERPL-MCNC: 2.92 PG/ML

## 2022-03-03 ENCOUNTER — APPOINTMENT (OUTPATIENT)
Dept: ENDOCRINOLOGY | Facility: CLINIC | Age: 72
End: 2022-03-03
Payer: MEDICARE

## 2022-04-26 ENCOUNTER — APPOINTMENT (OUTPATIENT)
Dept: CARDIOLOGY | Facility: CLINIC | Age: 72
End: 2022-04-26
Payer: MEDICARE

## 2022-04-26 ENCOUNTER — NON-APPOINTMENT (OUTPATIENT)
Age: 72
End: 2022-04-26

## 2022-04-26 ENCOUNTER — APPOINTMENT (OUTPATIENT)
Dept: ENDOCRINOLOGY | Facility: CLINIC | Age: 72
End: 2022-04-26
Payer: MEDICARE

## 2022-04-26 VITALS
BODY MASS INDEX: 23.32 KG/M2 | DIASTOLIC BLOOD PRESSURE: 86 MMHG | SYSTOLIC BLOOD PRESSURE: 150 MMHG | WEIGHT: 140 LBS | HEIGHT: 65 IN | OXYGEN SATURATION: 98 % | HEART RATE: 97 BPM | TEMPERATURE: 98.4 F

## 2022-04-26 DIAGNOSIS — Z12.11 ENCOUNTER FOR SCREENING FOR MALIGNANT NEOPLASM OF COLON: ICD-10-CM

## 2022-04-26 DIAGNOSIS — R53.83 OTHER FATIGUE: ICD-10-CM

## 2022-04-26 LAB
GLUCOSE BLDC GLUCOMTR-MCNC: 114
HBA1C MFR BLD HPLC: 6.9

## 2022-04-26 PROCEDURE — 83036 HEMOGLOBIN GLYCOSYLATED A1C: CPT | Mod: QW

## 2022-04-26 PROCEDURE — 93880 EXTRACRANIAL BILAT STUDY: CPT

## 2022-04-26 PROCEDURE — 82962 GLUCOSE BLOOD TEST: CPT

## 2022-04-26 PROCEDURE — 99214 OFFICE O/P EST MOD 30 MIN: CPT

## 2022-04-26 PROCEDURE — 99214 OFFICE O/P EST MOD 30 MIN: CPT | Mod: 25

## 2022-04-26 PROCEDURE — 93000 ELECTROCARDIOGRAM COMPLETE: CPT

## 2022-04-26 NOTE — HISTORY OF PRESENT ILLNESS
[FreeTextEntry1] : This is a 71 year old gentlemen with a PMHx of anemia, DMII, HTN, HLD, Vitamin D Deficiency and lumbar disc disease presents today for follow up\par \par pt reports overall feeling well\par continues to reports lower back pain however pt states he has not gotten the MRI or followed up with Dr Nissenbuam pt states he will make appt soon\par \par Denies any chest pain dizziness SOB fever or chills pt with rare fatigue

## 2022-04-28 LAB
APO B SERPL-MCNC: 80 MG/DL
APO LP(A) SERPL-MCNC: 39.9 NMOL/L
CREAT SPEC-SCNC: 181 MG/DL
LDLC SERPL DIRECT ASSAY-MCNC: 80 MG/DL
MICROALBUMIN 24H UR DL<=1MG/L-MCNC: 4.3 MG/DL
MICROALBUMIN/CREAT 24H UR-RTO: 24 MG/G
T3FREE SERPL-MCNC: 3.03 PG/ML

## 2022-04-30 NOTE — HISTORY OF PRESENT ILLNESS
[FreeTextEntry1] : Mr. HAYS is a 71 year old male who  returns today  in follow up with regard to a history of type 2 diabetes mellitus.  The Diabetes has been present for over 5 years.There is no known history of retinopathy, nephropathy. He  too denies any history of neuropathy but notes occasional tingling in gutierrez feet. Current dm medication include Metformin 500mg two bid   HGM of late has shown values to be running  130s- 140s.  There has been no significant hypoglycemia.  He  denies any chest pain, sob, neurologic or ophthalmologic complaints. He  too denies any new podiatric concerns. He  is up to date with his ophthalmologic visits without hx of retinopathy.\par \par POCT A1c returned today at 6.9%  ; previously 6.4% from 12/3/2021\par POCT glucose returned today at  114  mg/dL \par \par Additional medical history includes that of  htn, and hyperlipidemia. Is on losartan 100 mg  and Pravastatin 80 mg.\par Taking Losartan 100 mg, \par \par Recent carotid Doppler did indicate some plaque build up RT carotid mild intimal wall tickening. LFT carotid proc ICA prox ECA

## 2022-08-22 ENCOUNTER — APPOINTMENT (OUTPATIENT)
Dept: CARDIOLOGY | Facility: CLINIC | Age: 72
End: 2022-08-22

## 2022-08-22 ENCOUNTER — APPOINTMENT (OUTPATIENT)
Dept: ENDOCRINOLOGY | Facility: CLINIC | Age: 72
End: 2022-08-22

## 2022-08-22 ENCOUNTER — LABORATORY RESULT (OUTPATIENT)
Age: 72
End: 2022-08-22

## 2022-08-22 VITALS
TEMPERATURE: 98.2 F | OXYGEN SATURATION: 98 % | WEIGHT: 142 LBS | HEART RATE: 96 BPM | DIASTOLIC BLOOD PRESSURE: 84 MMHG | BODY MASS INDEX: 23.66 KG/M2 | HEIGHT: 65 IN | SYSTOLIC BLOOD PRESSURE: 140 MMHG

## 2022-08-22 VITALS
DIASTOLIC BLOOD PRESSURE: 84 MMHG | OXYGEN SATURATION: 98 % | HEIGHT: 65 IN | TEMPERATURE: 98.5 F | SYSTOLIC BLOOD PRESSURE: 158 MMHG | WEIGHT: 142.31 LBS | HEART RATE: 105 BPM | BODY MASS INDEX: 23.71 KG/M2

## 2022-08-22 VITALS — DIASTOLIC BLOOD PRESSURE: 80 MMHG | SYSTOLIC BLOOD PRESSURE: 138 MMHG

## 2022-08-22 DIAGNOSIS — Z12.11 ENCOUNTER FOR SCREENING FOR MALIGNANT NEOPLASM OF COLON: ICD-10-CM

## 2022-08-22 DIAGNOSIS — Z12.5 ENCOUNTER FOR SCREENING FOR MALIGNANT NEOPLASM OF PROSTATE: ICD-10-CM

## 2022-08-22 DIAGNOSIS — M25.60 STIFFNESS OF UNSPECIFIED JOINT, NOT ELSEWHERE CLASSIFIED: ICD-10-CM

## 2022-08-22 LAB
GLUCOSE BLDC GLUCOMTR-MCNC: 124
HBA1C MFR BLD HPLC: 7.3

## 2022-08-22 PROCEDURE — 99213 OFFICE O/P EST LOW 20 MIN: CPT

## 2022-08-22 PROCEDURE — 99214 OFFICE O/P EST MOD 30 MIN: CPT

## 2022-08-22 PROCEDURE — 83036 HEMOGLOBIN GLYCOSYLATED A1C: CPT | Mod: QW

## 2022-08-22 PROCEDURE — 82962 GLUCOSE BLOOD TEST: CPT

## 2022-08-22 RX ORDER — ROSUVASTATIN CALCIUM 20 MG/1
20 TABLET, FILM COATED ORAL
Qty: 60 | Refills: 0 | Status: DISCONTINUED | COMMUNITY
Start: 2022-04-28 | End: 2022-08-22

## 2022-08-22 NOTE — HISTORY OF PRESENT ILLNESS
[FreeTextEntry1] : Mr. HAYS is a 71 year old male who returns today in follow up with regard to a history of type 2 diabetes mellitus. The Diabetes has been present for over 5 years.There is no known history of retinopathy, nephropathy. He too denies any history of neuropathy but notes occasional tingling in gutierrez feet. \par \par Current dm medication include Metformin 500mg two bid \par \par HGM of late has shown values to be running in the 130-150s. There has been no significant hypoglycemia.\par \par  He denies any chest pain, sob, neurologic or ophthalmologic complaints. He too denies any new podiatric concerns. He is up to date with his ophthalmologic visits without hx of retinopathy.\par \par POCT A1c returned today at 7.3% ; previously 7.2% on 04/26/2022.\par POCT glucose returned today at 124 mg/dL \par \par Additional medical history includes that of htn, and hyperlipidemia. Is on losartan 100 . Switched from pravastatin 80 mg to rosuvastatin 20 mg QD on 4/26/22 given LDL 80 and has pmhx of plaque buildup in carotid artery. \par Taking Losartan 100 mg, \par \par Following with Dr. Nissenbaum.\par Recent carotid Doppler did indicate some plaque build up RT carotid mild intimal wall tickening. LFT carotid prox ICA, prox ECA \par Taking vitamin D3 1,000 iu two capsules daily.

## 2022-08-22 NOTE — HISTORY OF PRESENT ILLNESS
[FreeTextEntry1] : This is a 72 y/o male with a pmhx of anemia, DMII, HTN, HLD, Vitamin D Deficiency here today for a follow up. Pt feels well and has no complaints. Patient denies chest pain, dyspnea, palpitations, dizziness, syncope, changes in bowel/bladder habits or appetite. pt with persistent stiffness legs pending mri ls spine with neurology

## 2022-08-23 DIAGNOSIS — R89.9 UNSPECIFIED ABNORMAL FINDING IN SPECIMENS FROM OTHER ORGANS, SYSTEMS AND TISSUES: ICD-10-CM

## 2022-08-23 LAB
25(OH)D3 SERPL-MCNC: 59.4 NG/ML
25(OH)D3 SERPL-MCNC: 77.2 NG/ML
ALBUMIN SERPL ELPH-MCNC: 4.6 G/DL
ALBUMIN SERPL ELPH-MCNC: 4.7 G/DL
ALP BLD-CCNC: 54 U/L
ALP BLD-CCNC: 56 U/L
ALT SERPL-CCNC: 6 U/L
ALT SERPL-CCNC: 8 U/L
ANION GAP SERPL CALC-SCNC: 13 MMOL/L
ANION GAP SERPL CALC-SCNC: 15 MMOL/L
APPEARANCE: CLEAR
AST SERPL-CCNC: 16 U/L
AST SERPL-CCNC: 19 U/L
BACTERIA: NEGATIVE
BASOPHILS # BLD AUTO: 0.07 K/UL
BASOPHILS NFR BLD AUTO: 0.6 %
BILIRUB SERPL-MCNC: 0.6 MG/DL
BILIRUB SERPL-MCNC: 0.6 MG/DL
BILIRUBIN URINE: NEGATIVE
BLOOD URINE: NEGATIVE
BUN SERPL-MCNC: 19 MG/DL
BUN SERPL-MCNC: 19 MG/DL
CALCIUM SERPL-MCNC: 9.6 MG/DL
CALCIUM SERPL-MCNC: 9.8 MG/DL
CHLORIDE SERPL-SCNC: 100 MMOL/L
CHLORIDE SERPL-SCNC: 101 MMOL/L
CHOLEST SERPL-MCNC: 181 MG/DL
CHOLEST SERPL-MCNC: 184 MG/DL
CK SERPL-CCNC: 222 U/L
CO2 SERPL-SCNC: 23 MMOL/L
CO2 SERPL-SCNC: 24 MMOL/L
COLOR: YELLOW
CREAT SERPL-MCNC: 1.41 MG/DL
CREAT SERPL-MCNC: 1.44 MG/DL
CREAT SPEC-SCNC: 220 MG/DL
CREAT SPEC-SCNC: 227 MG/DL
CRP SERPL-MCNC: <3 MG/L
EGFR: 51 ML/MIN/1.73M2
EGFR: 53 ML/MIN/1.73M2
EOSINOPHIL # BLD AUTO: 0.48 K/UL
EOSINOPHIL NFR BLD AUTO: 4.4 %
ERYTHROCYTE [SEDIMENTATION RATE] IN BLOOD BY WESTERGREN METHOD: 15 MM/HR
ESTIMATED AVERAGE GLUCOSE: 157 MG/DL
FERRITIN SERPL-MCNC: 16 NG/ML
FOLATE SERPL-MCNC: 19.2 NG/ML
FRUCTOSAMINE SERPL-MCNC: 308 UMOL/L
GLUCOSE QUALITATIVE U: NEGATIVE
GLUCOSE SERPL-MCNC: 104 MG/DL
GLUCOSE SERPL-MCNC: 114 MG/DL
GLYCOMARK.: 10.1 UG/ML
HAPTOGLOB SERPL-MCNC: 182 MG/DL
HBA1C MFR BLD HPLC: 7.1 %
HCT VFR BLD CALC: 41 %
HDLC SERPL-MCNC: 46 MG/DL
HDLC SERPL-MCNC: 49 MG/DL
HGB BLD-MCNC: 13 G/DL
HYALINE CASTS: 1 /LPF
IMM GRANULOCYTES NFR BLD AUTO: 0.4 %
IRON SERPL-MCNC: 71 UG/DL
KETONES URINE: NEGATIVE
LDH SERPL-CCNC: 153 U/L
LDLC SERPL CALC-MCNC: 99 MG/DL
LDLC SERPL DIRECT ASSAY-MCNC: 111 MG/DL
LDLC SERPL DIRECT ASSAY-MCNC: 113 MG/DL
LEUKOCYTE ESTERASE URINE: ABNORMAL
LYMPHOCYTES # BLD AUTO: 3.01 K/UL
LYMPHOCYTES NFR BLD AUTO: 27.6 %
MAN DIFF?: NORMAL
MCHC RBC-ENTMCNC: 27.2 PG
MCHC RBC-ENTMCNC: 31.7 GM/DL
MCV RBC AUTO: 85.8 FL
MICROALBUMIN 24H UR DL<=1MG/L-MCNC: 1.5 MG/DL
MICROALBUMIN 24H UR DL<=1MG/L-MCNC: 1.5 MG/DL
MICROALBUMIN/CREAT 24H UR-RTO: 6 MG/G
MICROALBUMIN/CREAT 24H UR-RTO: 7 MG/G
MICROSCOPIC-UA: NORMAL
MONOCYTES # BLD AUTO: 0.72 K/UL
MONOCYTES NFR BLD AUTO: 6.6 %
NEUTROPHILS # BLD AUTO: 6.58 K/UL
NEUTROPHILS NFR BLD AUTO: 60.4 %
NITRITE URINE: NEGATIVE
NONHDLC SERPL-MCNC: 135 MG/DL
PH URINE: 6.5
PLATELET # BLD AUTO: 475 K/UL
POTASSIUM SERPL-SCNC: 5.3 MMOL/L
POTASSIUM SERPL-SCNC: 5.6 MMOL/L
PROT SERPL-MCNC: 7.2 G/DL
PROT SERPL-MCNC: 7.7 G/DL
PROTEIN URINE: NORMAL
PSA SERPL-MCNC: 1.53 NG/ML
RBC # BLD: 4.78 M/UL
RBC # FLD: 13.5 %
RED BLOOD CELLS URINE: 2 /HPF
RHEUMATOID FACT SER QL: <10 IU/ML
SODIUM SERPL-SCNC: 137 MMOL/L
SODIUM SERPL-SCNC: 138 MMOL/L
SPECIFIC GRAVITY URINE: 1.02
SQUAMOUS EPITHELIAL CELLS: 0 /HPF
T3FREE SERPL-MCNC: 2.6 PG/ML
T4 FREE SERPL-MCNC: 1.3 NG/DL
TRANSFERRIN SERPL-MCNC: 360 MG/DL
TRIGL SERPL-MCNC: 175 MG/DL
TRIGL SERPL-MCNC: 180 MG/DL
TSH SERPL-ACNC: 4.75 UIU/ML
TSH SERPL-ACNC: 5.01 UIU/ML
UROBILINOGEN URINE: NORMAL
VIT B12 SERPL-MCNC: 306 PG/ML
WBC # FLD AUTO: 10.9 K/UL
WHITE BLOOD CELLS URINE: 1 /HPF

## 2022-08-26 ENCOUNTER — NON-APPOINTMENT (OUTPATIENT)
Age: 72
End: 2022-08-26

## 2022-08-31 ENCOUNTER — NON-APPOINTMENT (OUTPATIENT)
Age: 72
End: 2022-08-31

## 2022-08-31 LAB
25(OH)D3 SERPL-MCNC: 41.8 NG/ML
25(OH)D3 SERPL-MCNC: 65.6 NG/ML
ALBUMIN SERPL ELPH-MCNC: 4.3 G/DL
ALBUMIN SERPL ELPH-MCNC: 4.8 G/DL
ALP BLD-CCNC: 58 U/L
ALP BLD-CCNC: 67 U/L
ALT SERPL-CCNC: 12 U/L
ALT SERPL-CCNC: 9 U/L
ANA SER IF-ACNC: NEGATIVE
ANION GAP SERPL CALC-SCNC: 14 MMOL/L
APPEARANCE: CLEAR
AST SERPL-CCNC: 17 U/L
AST SERPL-CCNC: 22 U/L
BACTERIA UR CULT: NORMAL
BACTERIA: NEGATIVE
BACTERIA: NEGATIVE
BASOPHILS # BLD AUTO: 0.05 K/UL
BASOPHILS # BLD AUTO: 0.06 K/UL
BASOPHILS # BLD AUTO: 0.06 K/UL
BASOPHILS NFR BLD AUTO: 0.6 %
BILIRUB DIRECT SERPL-MCNC: 0.1 MG/DL
BILIRUB DIRECT SERPL-MCNC: 0.2 MG/DL
BILIRUB INDIRECT SERPL-MCNC: 0.4 MG/DL
BILIRUB INDIRECT SERPL-MCNC: 0.5 MG/DL
BILIRUB SERPL-MCNC: 0.5 MG/DL
BILIRUB SERPL-MCNC: 0.7 MG/DL
BILIRUBIN URINE: NEGATIVE
BLOOD URINE: NEGATIVE
BUN SERPL-MCNC: 18 MG/DL
BUN SERPL-MCNC: 19 MG/DL
BUN SERPL-MCNC: 23 MG/DL
CALCIUM SERPL-MCNC: 10.1 MG/DL
CALCIUM SERPL-MCNC: 9.2 MG/DL
CALCIUM SERPL-MCNC: 9.9 MG/DL
CCP AB SER IA-ACNC: <8 UNITS
CHLORIDE SERPL-SCNC: 100 MMOL/L
CHLORIDE SERPL-SCNC: 101 MMOL/L
CHLORIDE SERPL-SCNC: 101 MMOL/L
CHOLEST SERPL-MCNC: 150 MG/DL
CHOLEST SERPL-MCNC: 155 MG/DL
CK SERPL-CCNC: 256 U/L
CK SERPL-CCNC: 78 U/L
CO2 SERPL-SCNC: 22 MMOL/L
CO2 SERPL-SCNC: 23 MMOL/L
CO2 SERPL-SCNC: 23 MMOL/L
COLOR: NORMAL
COLOR: NORMAL
COLOR: YELLOW
CREAT SERPL-MCNC: 1.23 MG/DL
CREAT SERPL-MCNC: 1.31 MG/DL
CREAT SERPL-MCNC: 1.38 MG/DL
CREAT SPEC-SCNC: 140 MG/DL
DSDNA AB SER-ACNC: <12 IU/ML
EGFR: 54 ML/MIN/1.73M2
EGFR: 57 ML/MIN/1.73M2
EOSINOPHIL # BLD AUTO: 0.34 K/UL
EOSINOPHIL # BLD AUTO: 0.45 K/UL
EOSINOPHIL # BLD AUTO: 0.61 K/UL
EOSINOPHIL NFR BLD AUTO: 3.6 %
EOSINOPHIL NFR BLD AUTO: 5.1 %
EOSINOPHIL NFR BLD AUTO: 6.2 %
ESTIMATED AVERAGE GLUCOSE: 137 MG/DL
ESTIMATED AVERAGE GLUCOSE: 160 MG/DL
FERRITIN SERPL-MCNC: 24 NG/ML
FERRITIN SERPL-MCNC: 36 NG/ML
FOLATE SERPL-MCNC: 18.9 NG/ML
FOLATE SERPL-MCNC: >20 NG/ML
GLUCOSE QUALITATIVE U: NEGATIVE
GLUCOSE SERPL-MCNC: 102 MG/DL
GLUCOSE SERPL-MCNC: 107 MG/DL
GLUCOSE SERPL-MCNC: 111 MG/DL
HBA1C MFR BLD HPLC: 6.4 %
HBA1C MFR BLD HPLC: 7.2 %
HCT VFR BLD CALC: 39.4 %
HCT VFR BLD CALC: 41.9 %
HCT VFR BLD CALC: 42 %
HDLC SERPL-MCNC: 49 MG/DL
HDLC SERPL-MCNC: 60 MG/DL
HGB BLD-MCNC: 12.2 G/DL
HGB BLD-MCNC: 13.1 G/DL
HGB BLD-MCNC: 13.3 G/DL
HYALINE CASTS: 0 /LPF
HYALINE CASTS: 2 /LPF
IMM GRANULOCYTES NFR BLD AUTO: 0.1 %
IMM GRANULOCYTES NFR BLD AUTO: 0.2 %
IMM GRANULOCYTES NFR BLD AUTO: 0.3 %
IRON SATN MFR SERPL: 12 %
IRON SERPL-MCNC: 50 UG/DL
IRON SERPL-MCNC: 72 UG/DL
KETONES URINE: NEGATIVE
LDLC SERPL CALC-MCNC: 76 MG/DL
LDLC SERPL CALC-MCNC: 80 MG/DL
LEUKOCYTE ESTERASE URINE: NEGATIVE
LYMPHOCYTES # BLD AUTO: 2.39 K/UL
LYMPHOCYTES # BLD AUTO: 2.41 K/UL
LYMPHOCYTES # BLD AUTO: 3.06 K/UL
LYMPHOCYTES NFR BLD AUTO: 25.3 %
LYMPHOCYTES NFR BLD AUTO: 27.1 %
LYMPHOCYTES NFR BLD AUTO: 31.3 %
MAGNESIUM SERPL-MCNC: 2 MG/DL
MAN DIFF?: NORMAL
MCHC RBC-ENTMCNC: 27.1 PG
MCHC RBC-ENTMCNC: 27.8 PG
MCHC RBC-ENTMCNC: 28.5 PG
MCHC RBC-ENTMCNC: 31 GM/DL
MCHC RBC-ENTMCNC: 31.3 GM/DL
MCHC RBC-ENTMCNC: 31.7 GM/DL
MCV RBC AUTO: 87.4 FL
MCV RBC AUTO: 88.8 FL
MCV RBC AUTO: 90.1 FL
MICROALBUMIN 24H UR DL<=1MG/L-MCNC: 2.2 MG/DL
MICROALBUMIN/CREAT 24H UR-RTO: 15 MG/G
MICROSCOPIC-UA: NORMAL
MICROSCOPIC-UA: NORMAL
MONOCYTES # BLD AUTO: 0.71 K/UL
MONOCYTES # BLD AUTO: 0.73 K/UL
MONOCYTES # BLD AUTO: 0.77 K/UL
MONOCYTES NFR BLD AUTO: 7.5 %
MONOCYTES NFR BLD AUTO: 8.1 %
MONOCYTES NFR BLD AUTO: 8.1 %
NEUTROPHILS # BLD AUTO: 5.18 K/UL
NEUTROPHILS # BLD AUTO: 5.3 K/UL
NEUTROPHILS # BLD AUTO: 5.94 K/UL
NEUTROPHILS NFR BLD AUTO: 54.3 %
NEUTROPHILS NFR BLD AUTO: 58.8 %
NEUTROPHILS NFR BLD AUTO: 62.2 %
NITRITE URINE: NEGATIVE
NONHDLC SERPL-MCNC: 102 MG/DL
NONHDLC SERPL-MCNC: 95 MG/DL
PH URINE: 6
PH URINE: 6.5
PH URINE: 7
PLATELET # BLD AUTO: 449 K/UL
PLATELET # BLD AUTO: 456 K/UL
PLATELET # BLD AUTO: 459 K/UL
POTASSIUM SERPL-SCNC: 4.6 MMOL/L
POTASSIUM SERPL-SCNC: 5.2 MMOL/L
POTASSIUM SERPL-SCNC: 5.2 MMOL/L
PROT SERPL-MCNC: 7.5 G/DL
PROT SERPL-MCNC: 7.5 G/DL
PROTEIN URINE: NEGATIVE
PROTEIN URINE: NORMAL
PROTEIN URINE: NORMAL
RBC # BLD: 4.51 M/UL
RBC # BLD: 4.66 M/UL
RBC # BLD: 4.72 M/UL
RBC # FLD: 12.2 %
RBC # FLD: 12.8 %
RBC # FLD: 13.4 %
RED BLOOD CELLS URINE: 1 /HPF
RED BLOOD CELLS URINE: 2 /HPF
RF+CCP IGG SER-IMP: NEGATIVE
SODIUM SERPL-SCNC: 137 MMOL/L
SODIUM SERPL-SCNC: 138 MMOL/L
SODIUM SERPL-SCNC: 138 MMOL/L
SPECIFIC GRAVITY URINE: 1.02
SQUAMOUS EPITHELIAL CELLS: 0 /HPF
SQUAMOUS EPITHELIAL CELLS: 0 /HPF
T4 FREE SERPL-MCNC: 1.2 NG/DL
T4 FREE SERPL-MCNC: 1.2 NG/DL
TIBC SERPL-MCNC: 433 UG/DL
TRANSFERRIN SERPL-MCNC: 321 MG/DL
TRIGL SERPL-MCNC: 129 MG/DL
TRIGL SERPL-MCNC: 76 MG/DL
TSH SERPL-ACNC: 2.8 UIU/ML
TSH SERPL-ACNC: 3.57 UIU/ML
UIBC SERPL-MCNC: 383 UG/DL
UROBILINOGEN URINE: NORMAL
VIT B12 SERPL-MCNC: 311 PG/ML
VIT B12 SERPL-MCNC: 321 PG/ML
WBC # FLD AUTO: 8.81 K/UL
WBC # FLD AUTO: 9.54 K/UL
WBC # FLD AUTO: 9.77 K/UL
WHITE BLOOD CELLS URINE: 1 /HPF
WHITE BLOOD CELLS URINE: 1 /HPF

## 2022-12-22 ENCOUNTER — NON-APPOINTMENT (OUTPATIENT)
Age: 72
End: 2022-12-22

## 2022-12-22 ENCOUNTER — APPOINTMENT (OUTPATIENT)
Dept: CARDIOLOGY | Facility: CLINIC | Age: 72
End: 2022-12-22

## 2022-12-22 ENCOUNTER — APPOINTMENT (OUTPATIENT)
Dept: ENDOCRINOLOGY | Facility: CLINIC | Age: 72
End: 2022-12-22
Payer: MEDICARE

## 2022-12-22 VITALS
SYSTOLIC BLOOD PRESSURE: 140 MMHG | DIASTOLIC BLOOD PRESSURE: 74 MMHG | OXYGEN SATURATION: 98 % | HEIGHT: 65 IN | HEART RATE: 97 BPM | TEMPERATURE: 98.5 F | BODY MASS INDEX: 23.99 KG/M2 | WEIGHT: 144 LBS

## 2022-12-22 VITALS
HEIGHT: 65 IN | BODY MASS INDEX: 23.99 KG/M2 | HEART RATE: 97 BPM | DIASTOLIC BLOOD PRESSURE: 70 MMHG | OXYGEN SATURATION: 98 % | SYSTOLIC BLOOD PRESSURE: 150 MMHG | TEMPERATURE: 98.5 F | WEIGHT: 144 LBS

## 2022-12-22 VITALS — DIASTOLIC BLOOD PRESSURE: 74 MMHG | SYSTOLIC BLOOD PRESSURE: 140 MMHG

## 2022-12-22 DIAGNOSIS — M51.9 UNSPECIFIED THORACIC, THORACOLUMBAR AND LUMBOSACRAL INTERVERTEBRAL DISC DISORDER: ICD-10-CM

## 2022-12-22 DIAGNOSIS — R01.1 CARDIAC MURMUR, UNSPECIFIED: ICD-10-CM

## 2022-12-22 LAB
GLUCOSE BLDC GLUCOMTR-MCNC: 117
HBA1C MFR BLD HPLC: 7

## 2022-12-22 PROCEDURE — 93306 TTE W/DOPPLER COMPLETE: CPT

## 2022-12-22 PROCEDURE — 82962 GLUCOSE BLOOD TEST: CPT

## 2022-12-22 PROCEDURE — 83036 HEMOGLOBIN GLYCOSYLATED A1C: CPT | Mod: QW

## 2022-12-22 PROCEDURE — 99214 OFFICE O/P EST MOD 30 MIN: CPT

## 2022-12-22 PROCEDURE — ZZZZZ: CPT

## 2022-12-22 PROCEDURE — 93000 ELECTROCARDIOGRAM COMPLETE: CPT

## 2022-12-22 PROCEDURE — G0008: CPT

## 2022-12-22 PROCEDURE — 90662 IIV NO PRSV INCREASED AG IM: CPT

## 2022-12-22 PROCEDURE — 99214 OFFICE O/P EST MOD 30 MIN: CPT | Mod: 25

## 2022-12-22 NOTE — HISTORY OF PRESENT ILLNESS
[FreeTextEntry1] : This is a 73 y/o male with a pmhx of anemia, DMII, HTN, HLD, Vitamin D Deficiency here today for a follow up. Patient requesting flu vaccine today. He feels well and has no acute concerns or complaints.\par Patient denies chest pain, dyspnea, palpitations, dizziness, syncope, changes in bowel/bladder habits or appetite. \par

## 2022-12-22 NOTE — PHYSICAL EXAM
[Well Developed] : well developed [Well Nourished] : well nourished [No Acute Distress] : no acute distress [Normal Conjunctiva] : normal conjunctiva [Normal Venous Pressure] : normal venous pressure [No Carotid Bruit] : no carotid bruit [Normal S1, S2] : normal S1, S2 [No Rub] : no rub [No Gallop] : no gallop [Clear Lung Fields] : clear lung fields [Good Air Entry] : good air entry [No Respiratory Distress] : no respiratory distress  [Soft] : abdomen soft [Non Tender] : non-tender [No Masses/organomegaly] : no masses/organomegaly [Normal Bowel Sounds] : normal bowel sounds [Normal Gait] : normal gait [No Edema] : no edema [No Cyanosis] : no cyanosis [No Clubbing] : no clubbing [No Varicosities] : no varicosities [No Rash] : no rash [No Skin Lesions] : no skin lesions [Moves all extremities] : moves all extremities [No Focal Deficits] : no focal deficits [Normal Speech] : normal speech [Alert and Oriented] : alert and oriented [Normal memory] : normal memory [Murmur] : murmur [de-identified] : 2/6 systolic murmur apex

## 2022-12-23 ENCOUNTER — APPOINTMENT (OUTPATIENT)
Dept: MRI IMAGING | Facility: CLINIC | Age: 72
End: 2022-12-23

## 2022-12-23 ENCOUNTER — RESULT REVIEW (OUTPATIENT)
Age: 72
End: 2022-12-23

## 2022-12-23 ENCOUNTER — OUTPATIENT (OUTPATIENT)
Dept: OUTPATIENT SERVICES | Facility: HOSPITAL | Age: 72
LOS: 1 days | End: 2022-12-23
Payer: COMMERCIAL

## 2022-12-23 ENCOUNTER — APPOINTMENT (OUTPATIENT)
Dept: CT IMAGING | Facility: CLINIC | Age: 72
End: 2022-12-23
Payer: MEDICARE

## 2022-12-23 ENCOUNTER — OUTPATIENT (OUTPATIENT)
Dept: OUTPATIENT SERVICES | Facility: HOSPITAL | Age: 72
LOS: 1 days | End: 2022-12-23
Payer: SELF-PAY

## 2022-12-23 DIAGNOSIS — R20.2 PARESTHESIA OF SKIN: ICD-10-CM

## 2022-12-23 DIAGNOSIS — E78.5 HYPERLIPIDEMIA, UNSPECIFIED: ICD-10-CM

## 2022-12-23 DIAGNOSIS — M48.8X2 OTHER SPECIFIED SPONDYLOPATHIES, CERVICAL REGION: Chronic | ICD-10-CM

## 2022-12-23 DIAGNOSIS — Z00.8 ENCOUNTER FOR OTHER GENERAL EXAMINATION: ICD-10-CM

## 2022-12-23 DIAGNOSIS — M54.59 OTHER LOW BACK PAIN: ICD-10-CM

## 2022-12-23 LAB
CREAT SPEC-SCNC: 208 MG/DL
MICROALBUMIN 24H UR DL<=1MG/L-MCNC: 7 MG/DL
MICROALBUMIN/CREAT 24H UR-RTO: 34 MG/G

## 2022-12-23 PROCEDURE — 75571 CT HRT W/O DYE W/CA TEST: CPT | Mod: 26

## 2022-12-23 PROCEDURE — 75571 CT HRT W/O DYE W/CA TEST: CPT

## 2022-12-23 PROCEDURE — 72148 MRI LUMBAR SPINE W/O DYE: CPT

## 2022-12-23 PROCEDURE — 72148 MRI LUMBAR SPINE W/O DYE: CPT | Mod: 26

## 2022-12-29 NOTE — HISTORY OF PRESENT ILLNESS
[FreeTextEntry1] : Mr. HAYS is a 72 year old male who returns today in follow up with regard to a history of type 2 diabetes mellitus. The Diabetes has been present for over 5 years.There is no known history of retinopathy, nephropathy. He too denies any history of neuropathy but notes occasional tingling in both feet. \par Does note some numbness and tingling in the upper anterior thighs. Is following with Dr. Nissenbaum. \par \par Current dm medication include Metformin 500mg two bid. \par \par HGM of late has shown values to be ranging 125-130. There has been no significant hypoglycemia.\par \par  He denies any chest pain, sob, neurologic or ophthalmologic complaints. He too denies any new podiatric concerns. He is up to date with his ophthalmologic visits without hx of retinopathy.\par \par POCT A1C returned today 7.0%; previously  7.1 % on 8/23/22\par POCT glucose returned today at 117 mg/dl\par \par Additional medical history includes that of htn, and hyperlipidemia. Is on losartan 100 . Switched from pravastatin 80 mg to rosuvastatin 20 mg QD on 4/26/22 given LDL 80 and has pmhx of plaque buildup in carotid artery. \par Taking Losartan 100 mg, \par \par Following with Dr. Nissenbaum.\par Recent carotid Doppler did indicate some plaque build up RT carotid mild intimal wall tickening. LFT carotid prox ICA, prox ECA \par Taking vitamin D3 1,000 iu two capsules daily., and b12 supplement twice weekly.

## 2023-01-04 DIAGNOSIS — R93.1 ABNORMAL FINDINGS ON DIAGNOSTIC IMAGING OF HEART AND CORONARY CIRCULATION: ICD-10-CM

## 2023-01-04 DIAGNOSIS — E61.1 IRON DEFICIENCY: ICD-10-CM

## 2023-01-04 LAB
25(OH)D3 SERPL-MCNC: 49.4 NG/ML
ALBUMIN SERPL ELPH-MCNC: 4.5 G/DL
ALP BLD-CCNC: 67 U/L
ALT SERPL-CCNC: 15 U/L
APPEARANCE: CLEAR
AST SERPL-CCNC: 17 U/L
BACTERIA: NEGATIVE
BASOPHILS # BLD AUTO: 0.04 K/UL
BASOPHILS NFR BLD AUTO: 0.5 %
BILIRUB DIRECT SERPL-MCNC: 0.1 MG/DL
BILIRUB INDIRECT SERPL-MCNC: 0.3 MG/DL
BILIRUB SERPL-MCNC: 0.4 MG/DL
BILIRUBIN URINE: NEGATIVE
BLOOD URINE: NEGATIVE
CHOLEST SERPL-MCNC: 170 MG/DL
CK SERPL-CCNC: 119 U/L
COLOR: YELLOW
EOSINOPHIL # BLD AUTO: 0.48 K/UL
EOSINOPHIL NFR BLD AUTO: 5.6 %
ESTIMATED AVERAGE GLUCOSE: 163 MG/DL
FERRITIN SERPL-MCNC: 16 NG/ML
FOLATE SERPL-MCNC: >20 NG/ML
GLUCOSE QUALITATIVE U: NEGATIVE
HBA1C MFR BLD HPLC: 7.3 %
HCT VFR BLD CALC: 38.7 %
HDLC SERPL-MCNC: 59 MG/DL
HGB BLD-MCNC: 11.9 G/DL
HYALINE CASTS: 0 /LPF
IMM GRANULOCYTES NFR BLD AUTO: 0.2 %
IRON SATN MFR SERPL: 9 %
IRON SERPL-MCNC: 37 UG/DL
KETONES URINE: NEGATIVE
LDLC SERPL CALC-MCNC: 83 MG/DL
LEUKOCYTE ESTERASE URINE: NEGATIVE
LYMPHOCYTES # BLD AUTO: 2.64 K/UL
LYMPHOCYTES NFR BLD AUTO: 31 %
MAN DIFF?: NORMAL
MCHC RBC-ENTMCNC: 26.4 PG
MCHC RBC-ENTMCNC: 30.7 GM/DL
MCV RBC AUTO: 85.8 FL
MICROSCOPIC-UA: NORMAL
MONOCYTES # BLD AUTO: 1.03 K/UL
MONOCYTES NFR BLD AUTO: 12.1 %
NEUTROPHILS # BLD AUTO: 4.3 K/UL
NEUTROPHILS NFR BLD AUTO: 50.6 %
NITRITE URINE: NEGATIVE
NONHDLC SERPL-MCNC: 111 MG/DL
PH URINE: 6.5
PLATELET # BLD AUTO: 430 K/UL
PROT SERPL-MCNC: 7.3 G/DL
PROTEIN URINE: ABNORMAL
RBC # BLD: 4.51 M/UL
RBC # FLD: 12.5 %
RED BLOOD CELLS URINE: 1 /HPF
SPECIFIC GRAVITY URINE: 1.02
SQUAMOUS EPITHELIAL CELLS: 0 /HPF
T4 FREE SERPL-MCNC: 1.1 NG/DL
TIBC SERPL-MCNC: 425 UG/DL
TRIGL SERPL-MCNC: 143 MG/DL
TSH SERPL-ACNC: 3.19 UIU/ML
UIBC SERPL-MCNC: 388 UG/DL
UROBILINOGEN URINE: NORMAL
VIT B12 SERPL-MCNC: 353 PG/ML
WBC # FLD AUTO: 8.51 K/UL
WHITE BLOOD CELLS URINE: 0 /HPF

## 2023-01-12 ENCOUNTER — APPOINTMENT (OUTPATIENT)
Dept: CARDIOLOGY | Facility: CLINIC | Age: 73
End: 2023-01-12

## 2023-01-12 ENCOUNTER — OUTPATIENT (OUTPATIENT)
Dept: OUTPATIENT SERVICES | Facility: HOSPITAL | Age: 73
LOS: 1 days | End: 2023-01-12
Payer: COMMERCIAL

## 2023-01-12 DIAGNOSIS — R93.1 ABNORMAL FINDINGS ON DIAGNOSTIC IMAGING OF HEART AND CORONARY CIRCULATION: ICD-10-CM

## 2023-01-12 DIAGNOSIS — M48.8X2 OTHER SPECIFIED SPONDYLOPATHIES, CERVICAL REGION: Chronic | ICD-10-CM

## 2023-01-12 DIAGNOSIS — Z00.00 ENCOUNTER FOR GENERAL ADULT MEDICAL EXAMINATION WITHOUT ABNORMAL FINDINGS: ICD-10-CM

## 2023-01-12 PROCEDURE — 75574 CT ANGIO HRT W/3D IMAGE: CPT | Mod: 26

## 2023-01-12 PROCEDURE — 75574 CT ANGIO HRT W/3D IMAGE: CPT

## 2023-01-18 ENCOUNTER — NON-APPOINTMENT (OUTPATIENT)
Age: 73
End: 2023-01-18

## 2023-01-19 ENCOUNTER — NON-APPOINTMENT (OUTPATIENT)
Age: 73
End: 2023-01-19

## 2023-01-19 DIAGNOSIS — Z20.822 CONTACT WITH AND (SUSPECTED) EXPOSURE TO COVID-19: ICD-10-CM

## 2023-01-20 ENCOUNTER — INPATIENT (INPATIENT)
Facility: HOSPITAL | Age: 73
LOS: 1 days | Discharge: HOME | DRG: 247 | End: 2023-01-22
Attending: HOSPITALIST | Admitting: INTERNAL MEDICINE
Payer: COMMERCIAL

## 2023-01-20 ENCOUNTER — TRANSCRIPTION ENCOUNTER (OUTPATIENT)
Age: 73
End: 2023-01-20

## 2023-01-20 VITALS
RESPIRATION RATE: 18 BRPM | SYSTOLIC BLOOD PRESSURE: 162 MMHG | TEMPERATURE: 98 F | OXYGEN SATURATION: 100 % | DIASTOLIC BLOOD PRESSURE: 75 MMHG | HEART RATE: 96 BPM

## 2023-01-20 DIAGNOSIS — M48.8X2 OTHER SPECIFIED SPONDYLOPATHIES, CERVICAL REGION: Chronic | ICD-10-CM

## 2023-01-20 DIAGNOSIS — I65.23 OCCLUSION AND STENOSIS OF BILATERAL CAROTID ARTERIES: ICD-10-CM

## 2023-01-20 LAB
ANION GAP SERPL CALC-SCNC: 15 MMOL/L — SIGNIFICANT CHANGE UP (ref 5–17)
BUN SERPL-MCNC: 17 MG/DL — SIGNIFICANT CHANGE UP (ref 7–23)
CALCIUM SERPL-MCNC: 9.4 MG/DL — SIGNIFICANT CHANGE UP (ref 8.4–10.5)
CHLORIDE SERPL-SCNC: 104 MMOL/L — SIGNIFICANT CHANGE UP (ref 96–108)
CO2 SERPL-SCNC: 20 MMOL/L — LOW (ref 22–31)
CREAT SERPL-MCNC: 1.25 MG/DL — SIGNIFICANT CHANGE UP (ref 0.5–1.3)
EGFR: 60 ML/MIN/1.73M2 — SIGNIFICANT CHANGE UP
GLUCOSE BLDC GLUCOMTR-MCNC: 121 MG/DL — HIGH (ref 70–99)
GLUCOSE BLDC GLUCOMTR-MCNC: 180 MG/DL — HIGH (ref 70–99)
GLUCOSE BLDC GLUCOMTR-MCNC: 98 MG/DL — SIGNIFICANT CHANGE UP (ref 70–99)
GLUCOSE SERPL-MCNC: 119 MG/DL — HIGH (ref 70–99)
HCT VFR BLD CALC: 36.3 % — LOW (ref 39–50)
HGB BLD-MCNC: 11.3 G/DL — LOW (ref 13–17)
MCHC RBC-ENTMCNC: 25.8 PG — LOW (ref 27–34)
MCHC RBC-ENTMCNC: 31.1 GM/DL — LOW (ref 32–36)
MCV RBC AUTO: 82.9 FL — SIGNIFICANT CHANGE UP (ref 80–100)
NRBC # BLD: 0 /100 WBCS — SIGNIFICANT CHANGE UP (ref 0–0)
PLATELET # BLD AUTO: 401 K/UL — HIGH (ref 150–400)
POTASSIUM SERPL-MCNC: 4.7 MMOL/L — SIGNIFICANT CHANGE UP (ref 3.5–5.3)
POTASSIUM SERPL-SCNC: 4.7 MMOL/L — SIGNIFICANT CHANGE UP (ref 3.5–5.3)
RBC # BLD: 4.38 M/UL — SIGNIFICANT CHANGE UP (ref 4.2–5.8)
RBC # FLD: 12.9 % — SIGNIFICANT CHANGE UP (ref 10.3–14.5)
SODIUM SERPL-SCNC: 139 MMOL/L — SIGNIFICANT CHANGE UP (ref 135–145)
WBC # BLD: 8.49 K/UL — SIGNIFICANT CHANGE UP (ref 3.8–10.5)
WBC # FLD AUTO: 8.49 K/UL — SIGNIFICANT CHANGE UP (ref 3.8–10.5)

## 2023-01-20 PROCEDURE — 99152 MOD SED SAME PHYS/QHP 5/>YRS: CPT

## 2023-01-20 PROCEDURE — 51702 INSERT TEMP BLADDER CATH: CPT

## 2023-01-20 PROCEDURE — 92921: CPT | Mod: LC

## 2023-01-20 PROCEDURE — 93458 L HRT ARTERY/VENTRICLE ANGIO: CPT | Mod: 26,59

## 2023-01-20 PROCEDURE — 92928 PRQ TCAT PLMT NTRAC ST 1 LES: CPT | Mod: LC

## 2023-01-20 PROCEDURE — 93010 ELECTROCARDIOGRAM REPORT: CPT

## 2023-01-20 RX ORDER — CLOPIDOGREL BISULFATE 75 MG/1
75 TABLET, FILM COATED ORAL DAILY
Refills: 0 | Status: DISCONTINUED | OUTPATIENT
Start: 2023-01-21 | End: 2023-01-22

## 2023-01-20 RX ORDER — SODIUM CHLORIDE 9 MG/ML
1000 INJECTION, SOLUTION INTRAVENOUS
Refills: 0 | Status: DISCONTINUED | OUTPATIENT
Start: 2023-01-20 | End: 2023-01-22

## 2023-01-20 RX ORDER — METFORMIN HYDROCHLORIDE 850 MG/1
2 TABLET ORAL
Qty: 0 | Refills: 0 | DISCHARGE

## 2023-01-20 RX ORDER — INSULIN LISPRO 100/ML
VIAL (ML) SUBCUTANEOUS
Refills: 0 | Status: DISCONTINUED | OUTPATIENT
Start: 2023-01-20 | End: 2023-01-22

## 2023-01-20 RX ORDER — DEXTROSE 50 % IN WATER 50 %
25 SYRINGE (ML) INTRAVENOUS ONCE
Refills: 0 | Status: DISCONTINUED | OUTPATIENT
Start: 2023-01-20 | End: 2023-01-22

## 2023-01-20 RX ORDER — CLOPIDOGREL BISULFATE 75 MG/1
1 TABLET, FILM COATED ORAL
Qty: 90 | Refills: 3
Start: 2023-01-20 | End: 2024-01-14

## 2023-01-20 RX ORDER — GLUCAGON INJECTION, SOLUTION 0.5 MG/.1ML
1 INJECTION, SOLUTION SUBCUTANEOUS ONCE
Refills: 0 | Status: DISCONTINUED | OUTPATIENT
Start: 2023-01-20 | End: 2023-01-22

## 2023-01-20 RX ORDER — SODIUM CHLORIDE 9 MG/ML
1000 INJECTION INTRAMUSCULAR; INTRAVENOUS; SUBCUTANEOUS
Refills: 0 | Status: DISCONTINUED | OUTPATIENT
Start: 2023-01-20 | End: 2023-01-22

## 2023-01-20 RX ORDER — LOSARTAN POTASSIUM 100 MG/1
100 TABLET, FILM COATED ORAL DAILY
Refills: 0 | Status: DISCONTINUED | OUTPATIENT
Start: 2023-01-20 | End: 2023-01-22

## 2023-01-20 RX ORDER — ASPIRIN/CALCIUM CARB/MAGNESIUM 324 MG
2 TABLET ORAL
Qty: 0 | Refills: 0 | DISCHARGE

## 2023-01-20 RX ORDER — LOSARTAN POTASSIUM 100 MG/1
1 TABLET, FILM COATED ORAL
Qty: 0 | Refills: 0 | DISCHARGE

## 2023-01-20 RX ORDER — SODIUM CHLORIDE 9 MG/ML
250 INJECTION INTRAMUSCULAR; INTRAVENOUS; SUBCUTANEOUS ONCE
Refills: 0 | Status: COMPLETED | OUTPATIENT
Start: 2023-01-20 | End: 2023-01-20

## 2023-01-20 RX ORDER — INSULIN LISPRO 100/ML
VIAL (ML) SUBCUTANEOUS AT BEDTIME
Refills: 0 | Status: DISCONTINUED | OUTPATIENT
Start: 2023-01-20 | End: 2023-01-22

## 2023-01-20 RX ORDER — ASPIRIN/CALCIUM CARB/MAGNESIUM 324 MG
1 TABLET ORAL
Qty: 0 | Refills: 0 | DISCHARGE

## 2023-01-20 RX ORDER — DEXTROSE 50 % IN WATER 50 %
12.5 SYRINGE (ML) INTRAVENOUS ONCE
Refills: 0 | Status: DISCONTINUED | OUTPATIENT
Start: 2023-01-20 | End: 2023-01-22

## 2023-01-20 RX ORDER — ASPIRIN/CALCIUM CARB/MAGNESIUM 324 MG
81 TABLET ORAL DAILY
Refills: 0 | Status: DISCONTINUED | OUTPATIENT
Start: 2023-01-21 | End: 2023-01-22

## 2023-01-20 RX ORDER — DEXTROSE 50 % IN WATER 50 %
15 SYRINGE (ML) INTRAVENOUS ONCE
Refills: 0 | Status: DISCONTINUED | OUTPATIENT
Start: 2023-01-20 | End: 2023-01-22

## 2023-01-20 RX ADMIN — SODIUM CHLORIDE 125 MILLILITER(S): 9 INJECTION INTRAMUSCULAR; INTRAVENOUS; SUBCUTANEOUS at 14:02

## 2023-01-20 RX ADMIN — LOSARTAN POTASSIUM 100 MILLIGRAM(S): 100 TABLET, FILM COATED ORAL at 19:27

## 2023-01-20 RX ADMIN — SODIUM CHLORIDE 500 MILLILITER(S): 9 INJECTION INTRAMUSCULAR; INTRAVENOUS; SUBCUTANEOUS at 11:54

## 2023-01-20 RX ADMIN — Medication 80 MILLIGRAM(S): at 22:55

## 2023-01-20 NOTE — DISCHARGE NOTE PROVIDER - NSDCMRMEDTOKEN_GEN_ALL_CORE_FT
Aspirin Enteric Coated 81 mg oral delayed release tablet: 1 tab(s) orally once a day  losartan 100 mg oral tablet: 1 tab(s) orally once a day  metFORMIN 500 mg oral tablet: 2 tab(s) orally 2 times a day STARTING 1/23/23   Plavix 75 mg oral tablet: 1 tab(s) orally once a day   pravastatin 80 mg oral tablet: 1 tab(s) orally once a day   Aspirin Enteric Coated 81 mg oral delayed release tablet: 1 tab(s) orally once a day  losartan 100 mg oral tablet: 1 tab(s) orally once a day  metFORMIN 500 mg oral tablet: 2 tab(s) orally 2 times a day STARTING 1/23/23   Plavix 75 mg oral tablet: 1 tab(s) orally once a day   pravastatin 80 mg oral tablet: 1 tab(s) orally once a day  tamsulosin 0.4 mg oral capsule: 1 cap(s) orally once a day (at bedtime)

## 2023-01-20 NOTE — DISCHARGE NOTE PROVIDER - CARE PROVIDER_API CALL
Jose Gibbons)  Cardiology; Internal Medicine  [address] [facility] [address], [address]  [place] [address]  Phone: [phone]  Fax: [phone]  Follow Up Time: 2 weeks   Jose Gibbons (MD)  Cardiology; Internal Medicine  [address] [facility] [address], [address]  [place] [address]  Phone: [phone]  Fax: [phone]  Follow Up Time: 1 week    Azalia Nava)  Surgery  [address] [place] [address], [address] [place] [address]  Phone: [phone]  Fax: [phone]  Follow Up Time: 1 week

## 2023-01-20 NOTE — PROCEDURE NOTE - ADDITIONAL PROCEDURE DETAILS
UROLOGY PROGRESS NOTE:     Called to see patient for difficult holcomb placement.  Unsuccessful attempt by primary team/nursing staff.  18F coude holcomb placed under typical sterile technique.  No complications.  Patient tolerated procedure well.  ~800cc clear yellow urine drained.  Holcomb plan as per primary team.

## 2023-01-20 NOTE — ASU PATIENT PROFILE, ADULT - FALL HARM RISK - UNIVERSAL INTERVENTIONS
Bed in lowest position, wheels locked, appropriate side rails in place/Call bell, personal items and telephone in reach/Instruct patient to call for assistance before getting out of bed or chair/Non-slip footwear when patient is out of bed/Ault to call system/Physically safe environment - no spills, clutter or unnecessary equipment/Purposeful Proactive Rounding/Room/bathroom lighting operational, light cord in reach

## 2023-01-20 NOTE — DISCHARGE NOTE PROVIDER - PROVIDER TOKENS
PROVIDER:[TOKEN:[[id number]],FOLLOWUP:[2 weeks]] PROVIDER:[TOKEN:[[id number]],FOLLOWUP:[1 week]],PROVIDER:[TOKEN:[[id number]],FOLLOWUP:[1 week]]

## 2023-01-20 NOTE — H&P CARDIOLOGY - NSICDXPASTMEDICALHX_GEN_ALL_CORE_FT
PAST MEDICAL HISTORY:  Cervical cord compression with myelopathy     Diabetes mellitus     Dyslipidemia     HTN (hypertension)

## 2023-01-20 NOTE — DISCHARGE NOTE PROVIDER - CARE PROVIDERS DIRECT ADDRESSES
[email or web address]

## 2023-01-20 NOTE — H&P CARDIOLOGY - HISTORY OF PRESENT ILLNESS
72 year old male with PMH DM (T2), HTN, HLD. Pt reports exertional CP which resolves with rest.     < from: CT Angio Heart and Coronaries w/ IV Cont (01.12.23 @ 16:39) >  IMPRESSION:  Coronary CTA: Left coronary artery dominance.  Multifocal significant stenoses of the OM1 (severe), proximal LAD (moderate), D1 (moderate), and mid LCX (borderline mild to moderate). Distal left main disease approximately 30-40% by area.  < end of copied text >    < from: CT Heart Calcium Score (12.23.22 @ 18:05) >  IMPRESSION:  Calculated Agatston score of 695  < end of copied text >    12/22/22 TTE:   EF 70%  borderline septal LVH  prominent septal bulge measuring 1.4 cm. false tendon in LV cavity (normal variant)  grade 1 diastolic dysfunction  trace AI, mild MR, mild TR  aortic root at sinus of valsalva borderline dilated  no pericardial effusion    Pt referred by Dr Gibbons to Dr Duran for LHC. Denies CP, SOB, palpitations, N/V, fever/chills, abd pain, numbness/tingling/weakness, other c/o at this time.

## 2023-01-20 NOTE — DISCHARGE NOTE PROVIDER - NSDCFUADDINST_GEN_ALL_CORE_FT
Wound Care:   the day AFTER your procedure remove bandage GENTLY, and clean using  mild soap and gentle warm, water stream, pat dry. leave OPEN to air. YOU MAY SHOWER   DO NOT apply lotions, creams, ointments, powder, perfumes to your incision site  DO NOT SOAK your site for 1 week ( no baths, no pools, no tubs, etc...)  Check  your groin and /or wrist daily. A small amount of bruising, and soreness are normal    ACTIVITY: for 24 hours   - DO NOT DRIVE  - DO NOT make any important decisions or sign legal documents   - DO NOT operate heavy machineries   - you may resume sexual activity in 48 hours, unless otherwise instructed by your cardiologist     Your procedure was done through the WRIST: for the NEXT 3DAYS:  - avoid pushing, pulling, with that affected wrist   - avoid repeated movement of that hand and wrist ( eg: typing, hammering)  - DO NOT LIFT anything more than 5 lbs     MEDICATION:   take your medications as explained ( see discharge paperwork)   If you received a STENT, you will be taking antiplatelet medications to KEEP YOUR STENT OPEN ( eg: Aspirin, Plavix, Brilinta, Effient, etc).  Take as prescribed DO NOT STOP taking them without consulting with your cardiologist first.     Follow heart healthy diet recommended by your doctor, if you smoke STOP SMOKING ( may call [phone] for center of tobacco control if you need assistance)     CALL your doctor to make appointment in 2 WEEKS     ***CALL YOUR DOCTOR***  if you experience: fever, chills, body aches, or severe pain, swelling, redness, heat or yellow discharge at incision site  If you experience bleeding or excruciating pain at the procedural site, swelling ( golf ball size) at your procedural site  If you experience CHEST PAIN  If you experience extremity numbness, tingling, temperature change ( of your procedural site)   If you are unable to reach your doctor, you may contact:   -Cardiology Office at [facility] at [phone] or   - [facility] [phone]  - [facility] [phone]

## 2023-01-20 NOTE — DISCHARGE NOTE PROVIDER - NSDCCPCAREPLAN_GEN_ALL_CORE_FT
PRINCIPAL DISCHARGE DIAGNOSIS  Diagnosis: CAD (coronary artery disease)  Assessment and Plan of Treatment: Cardiac catheterization or coronary angiogram is done to understand how the heart is working and to look at the coronary arteries which supply oxygen to the heart muscles, to look at the heart chambers and the valves in the heart.  The doctor used your arm to do the procedure  You will need to see your doctor within one week after discharge  Call your doctor if the insertion site bleeds a lot, if you get a fever or have pain, swelling, or redness where the tube went in, or if your leg feels weak, numb, or cold.  No heavy lifting, strenuous activity, bending, straining or unnecessary stair climbing  for 2 weeks. No sex for 1 week, and no driving for 2 days after cath is recommeded. You may shower 24 hours following procedure but avoid baths and swimming for 1 week. Check the site for bleeding and/or swelling daily following procedure. Call your doctor/cardiologist immediately should it occur or if you have increased/persistent pain at the site. Follow up with your cardiologist in 1- 2 weeks. You may call [facility] at [phone] or [phone] after office hours and weekends  with any questions or concerns following your procedure. Take medications as prescribed.        SECONDARY DISCHARGE DIAGNOSES  Diagnosis: HLD (hyperlipidemia)  Assessment and Plan of Treatment: Cholesterol is a substance that is found in the blood. Everyone has some. It is needed for good health. The problem is, people sometimes have too much cholesterol. Compared with people with normal cholesterol, people with high cholesterol have a higher risk of heart attack, stroke, and other health problems. The higher your cholesterol, the higher your risk of these problems.  Not everyone who has high cholesterol needs medicines. Your doctor will decide if you need them based on your age, family history, and other health concerns.  The medicines most often used to treat high cholesterol are called "statins."  You should probably take a statin if you:   - Already had a heart attack or stroke   - Have known heart disease   - Have diabetes   - Have a condition called" peripheral artery disease," which makes it painful to walk, and happens when the arteries in your legs get clogged with fatty deposits   - Have an "abdominal aortic aneurysm," which is a widening of the main artery in the belly  Most people with any of the conditions listed above should take a statin no matter what their cholesterol level is.  If your doctor or nurse prescribes a statin, it's important to keep taking it. The medicine might not make you feel any different. But it can help prevent heart attack, stroke, and death  You can help lower your cholesterol by doing these things:   - You can lower your LDL, or "bad," cholesterol by avoiding red meat, butter, fried foods, cheese, and other foods that have a lot of saturated fat.   - You can lower triglycerides by avoiding sugary foods, fried foods, and excess alcohol.   - If you are overweight, it can help to lose weight. Your doctor or nurse can help you do this in a healthy way.   - Try to get regular physical activity. Even gentle forms of exercise, like walking, are good for your health.  Even if these steps do little to change your cholesterol, they can improve your health in many other ways      Diagnosis: DM (diabetes mellitus)  Assessment and Plan of Treatment: Make sure you get your HgA1c checked every three months.  If you take oral diabetes medications, check your blood glucose two times a day.  If you take insulin, check your blood glucose before meals and at bedtime.  It's important not to skip any meals.  Keep a log of your blood glucose results and always take it with you to your doctor appointments.  Keep a list of your current medications including injectables and over the counter medications and bring this medication list with you to all your doctor appointments.  If you have not seen your opthalmologist this year call for appointment.  Check your feet daily for redness, sores, or openings. Do not self treat. If no improvement in two days call your primary care physician for an appointment.  Low blood sugar (hypoglycemia) is a blood sugar below 70mg/dl. Check your blood sugar if you feel signs/symptoms of hypoglycemia. If your blood sugar is below 70 take 15 grams of carbohydrates (ex 4 oz of apple juice, 3-4 glucosr tablets, or 4-6 oz of regular soda) wait 15 minutes and repeat blood sugar to make sure it comes up above 70.  If your blood sugar is above 70 and you are due for a meal, have a meal.  If you are not due for a meal have a snack.  This snack helps keeps your blood sugar at a safe range.      Diagnosis: HTN (hypertension)  Assessment and Plan of Treatment: Hypertension, also known simply as "high blood pressure" is very common, however can lead to many significant complications if left uncontrolled. When the blood pressure is elevated, the force the blood puts on the walls of the arteries is high and can lead to artery damage. Also, when the heart muscle has to pump blood against a high blood pressure, it thickens and enlarges, just like any muscle does when it has to do more work (think of a weight ). When the blood pressure is very high, people may feel a headache or tired. Some people can feel pounding in their head or have blurry vision. Hearing the heart beating in the ear especially at night can be a sign of high blood pressure. Eventually, symptoms of stroke, heart attack, heart failure or irregular heartbeats can occur  - Exercise: Doing cardiovascular exercise such as running, biking or swimming at least 30 minutes per day most days of the week is recommended to help keep blood pressure healthy  - Lose weight: Maintaining a normal BMI (body mass index) is very important in keeping blood pressure readings normal   - Avoid salt: Sodium in the diet increases the blood pressure in many ways. Salt comes in many foods, so just because you don't add salt to your food it does not mean that you are eating a low salt diet. Read labels and keep sodium intake to less than 2000 mg per day   - Avoid alcohol: Even 1 or 2 alcoholic drinks can significantly increase blood pressure   - DASH Diet: The DASH diet has been shown to reduce blood pressure   - Take all medication as prescribed.   - Follow up with your medical doctor for routine blood pressure monitoring at your next visit.   - Notify your doctor if you have any of the following symptoms:    - Dizziness, Lightheadedness, Blurry vision, Headache, Chest pain, Shortness of breath       PRINCIPAL DISCHARGE DIAGNOSIS  Diagnosis: CAD (coronary artery disease)  Assessment and Plan of Treatment: Cardiac catheterization or coronary angiogram is done to understand how the heart is working and to look at the coronary arteries which supply oxygen to the heart muscles, to look at the heart chambers and the valves in the heart.  The doctor used your arm to do the procedure  You will need to see your doctor within one week after discharge  Call your doctor if the insertion site bleeds a lot, if you get a fever or have pain, swelling, or redness where the tube went in, or if your leg feels weak, numb, or cold.  No heavy lifting, strenuous activity, bending, straining or unnecessary stair climbing  for 2 weeks. No sex for 1 week, and no driving for 2 days after cath is recommeded. You may shower 24 hours following procedure but avoid baths and swimming for 1 week. Check the site for bleeding and/or swelling daily following procedure. Call your doctor/cardiologist immediately should it occur or if you have increased/persistent pain at the site. Follow up with your cardiologist in 1- 2 weeks. You may call [facility] at [phone] or [phone] after office hours and weekends  with any questions or concerns following your procedure. Take medications as prescribed.  Follow up with Dr Gibbons in one week, you need to have an echocardiogram.         SECONDARY DISCHARGE DIAGNOSES  Diagnosis: HLD (hyperlipidemia)  Assessment and Plan of Treatment: Cholesterol is a substance that is found in the blood. Everyone has some. It is needed for good health. The problem is, people sometimes have too much cholesterol. Compared with people with normal cholesterol, people with high cholesterol have a higher risk of heart attack, stroke, and other health problems. The higher your cholesterol, the higher your risk of these problems.  Not everyone who has high cholesterol needs medicines. Your doctor will decide if you need them based on your age, family history, and other health concerns.  The medicines most often used to treat high cholesterol are called "statins."  You should probably take a statin if you:   - Already had a heart attack or stroke   - Have known heart disease   - Have diabetes   - Have a condition called" peripheral artery disease," which makes it painful to walk, and happens when the arteries in your legs get clogged with fatty deposits   - Have an "abdominal aortic aneurysm," which is a widening of the main artery in the belly  Most people with any of the conditions listed above should take a statin no matter what their cholesterol level is.  If your doctor or nurse prescribes a statin, it's important to keep taking it. The medicine might not make you feel any different. But it can help prevent heart attack, stroke, and death  You can help lower your cholesterol by doing these things:   - You can lower your LDL, or "bad," cholesterol by avoiding red meat, butter, fried foods, cheese, and other foods that have a lot of saturated fat.   - You can lower triglycerides by avoiding sugary foods, fried foods, and excess alcohol.   - If you are overweight, it can help to lose weight. Your doctor or nurse can help you do this in a healthy way.   - Try to get regular physical activity. Even gentle forms of exercise, like walking, are good for your health.  Even if these steps do little to change your cholesterol, they can improve your health in many other ways      Diagnosis: DM (diabetes mellitus)  Assessment and Plan of Treatment: Make sure you get your HgA1c checked every three months.  If you take oral diabetes medications, check your blood glucose two times a day.  If you take insulin, check your blood glucose before meals and at bedtime.  It's important not to skip any meals.  Keep a log of your blood glucose results and always take it with you to your doctor appointments.  Keep a list of your current medications including injectables and over the counter medications and bring this medication list with you to all your doctor appointments.  If you have not seen your opthalmologist this year call for appointment.  Check your feet daily for redness, sores, or openings. Do not self treat. If no improvement in two days call your primary care physician for an appointment.  Low blood sugar (hypoglycemia) is a blood sugar below 70mg/dl. Check your blood sugar if you feel signs/symptoms of hypoglycemia. If your blood sugar is below 70 take 15 grams of carbohydrates (ex 4 oz of apple juice, 3-4 glucosr tablets, or 4-6 oz of regular soda) wait 15 minutes and repeat blood sugar to make sure it comes up above 70.  If your blood sugar is above 70 and you are due for a meal, have a meal.  If you are not due for a meal have a snack.  This snack helps keeps your blood sugar at a safe range.      Diagnosis: HTN (hypertension)  Assessment and Plan of Treatment: Low salt diet  Activity as tolerated.  Take all medication as prescribed.  Follow up with your medical doctor for routine blood pressure monitoring at your next visit.  Notify your doctor if you have any of the following symptoms:   Dizziness, Lightheadedness, Blurry vision, Headache, Chest pain, Shortness of breath    Diagnosis: Pseudoaneurysm  Assessment and Plan of Treatment: Follow up with Dr Nava in one week.  Call to make an appointment.   You will need to have a repeat arterial doppler.    Diagnosis: Urinary retention  Assessment and Plan of Treatment: Empty holcomb as instructed.  Follow up with your outpatient urologist for timing of catheter removal.    Diagnosis: Near syncope  Assessment and Plan of Treatment: Have someone stay with you until you feel stable.  Do not drive, operate machinery, or play sports until your caregiver says it is okay.  Keep all follow-up appointments as directed by your caregiver.   Lie down right away if you start feeling like you might faint. Breathe deeply and steadily. Wait until all the symptoms have passed.Drink enough fluids to keep your urine clear or pale yellow.  If you are taking blood pressure or heart medicine, get up slowly, taking several minutes to sit and then stand. This can reduce dizziness.

## 2023-01-20 NOTE — DISCHARGE NOTE PROVIDER - NSDCFUADDAPPT_GEN_ALL_CORE_FT
APPTS ARE READY TO BE MADE: [x] YES    Best Family or Patient Contact (if needed):    Additional Information about above appointments (if needed):    1:   2:   3:     Other comments or requests:    APPTS ARE READY TO BE MADE: [x] YES    Best Family or Patient Contact (if needed):    Additional Information about above appointments (if needed):    1:   2:   3:     Other comments or requests:   Patient was scheduled with Dr. Baltazar Gomez on 02/21 1:10pm at [address] [place] [address], [place] [address].									 								                                  Patient was scheduled with Dr. Azalia Nava on 02/01 9:45am at [address] [place] [address] through the provider's office. Patient advised of appointment details.									                                   Patient was provided with follow up request details for Dr. Jose Gibbons and was advised to call to schedule follow up within specified time frame.

## 2023-01-20 NOTE — DISCHARGE NOTE PROVIDER - NSDCCPTREATMENT_GEN_ALL_CORE_FT
PRINCIPAL PROCEDURE  Procedure: Left heart cardiac cath  Findings and Treatment: 1/20/23  LHC - 1 drug eluting stent placed to proximal Cx (80%) and POBA to OM1 (70%) via RRA

## 2023-01-20 NOTE — ASU DISCHARGE PLAN (ADULT/PEDIATRIC) - ASU DC SPECIAL INSTRUCTIONSFT

## 2023-01-20 NOTE — DISCHARGE NOTE PROVIDER - HOSPITAL COURSE
72 year old male with PMH DM (T2), HTN, HLD. Pt reports exertional CP which resolves with rest.     < from: CT Angio Heart and Coronaries w/ IV Cont (01.12.23 @ 16:39) >  IMPRESSION:  Coronary CTA: Left coronary artery dominance.  Multifocal significant stenoses of the OM1 (severe), proximal LAD (moderate), D1 (moderate), and mid LCX (borderline mild to moderate). Distal left main disease approximately 30-40% by area.  < end of copied text >    < from: CT Heart Calcium Score (12.23.22 @ 18:05) >  IMPRESSION:  Calculated Agatston score of 695  < end of copied text >    12/22/22 TTE:   EF 70%  borderline septal LVH  prominent septal bulge measuring 1.4 cm. false tendon in LV cavity (normal variant)  grade 1 diastolic dysfunction  trace AI, mild MR, mild TR  aortic root at sinus of valsalva borderline dilated  no pericardial effusion    Pt referred by Dr Gibbons to Dr Duran for LHC. Denies CP, SOB, palpitations, N/V, fever/chills, abd pain, numbness/tingling/weakness, other c/o at this time.      (20 Jan 2023 11:13)    Patient s/p LHC - 1 drug eluting stent placed to proximal Cx (80%) and POBA to OM1 (70%) via RRA on 1/20/23    Of note, patient was unable to urinate post procedure, bladder scan > 450 cc with discomfort. Wilder was placed by Urology at bedside on 1/20/23  Questionable pseudoaneurysm post procedure, Vascular consulted and RUE US ordered

## 2023-01-20 NOTE — DISCHARGE NOTE PROVIDER - NSDCFUSCHEDAPPT_GEN_ALL_CORE_FT
[facility]  VASCULAR [address]  Scheduled Appointment: 01/30/2023    Azalia Nava  [facility]  VASCULAR [address]  Scheduled Appointment: 02/01/2023

## 2023-01-20 NOTE — CHART NOTE - NSCHARTNOTEFT_GEN_A_CORE
Cardiology Fellow Update Note:    Asked by pt's primary team ACP's to evaluate pt's R radial access site. The pt is s/p LHC today via RRA. Per primary team, earlier in the day the pt's TR band was removed as scheduled however pt was noted to have small hematoma so TR band was replaced. This evening TR band was removed again after several hours but pt with continued abnormal exam; I was called to evaluate at bedside.    On exam pt is hemodynamically stable (HR 96, /82). States that he has minimal pain at his R wrist. On exam pt with pulsatile, firm [id number] mass at R radial puncture site. No bleeding or active expansion noted. With compression no change to mass.     Asked team to place TR band back on, elevate arm, obtain vascular surgery consult, and obtain arterial ultrasound of R radial artery. Will notify interventional cardiologist.

## 2023-01-20 NOTE — ASU DISCHARGE PLAN (ADULT/PEDIATRIC) - CARE PROVIDER_API CALL
Jose Gibbons)  Cardiology; Internal Medicine  [address] [facility] [address], [address]  [place] [address]  Phone: [phone]  Fax: [phone]  Follow Up Time:

## 2023-01-20 NOTE — ASU DISCHARGE PLAN (ADULT/PEDIATRIC) - NS MD DC FALL RISK RISK
For information on Fall & Injury Prevention, visit: [email or web address] OR  [email or web address] OR  https://www.cdc.gov/steadi/patient.html

## 2023-01-20 NOTE — PROCEDURE NOTE - NSURITECHNIQUE_GU_A_CORE
Proper hand hygiene was performed/The catheter was appropriately lubricated/The catheter was secured with a securement device (e.g. StatLock)/The urinary drainage system is closed at the end of the procedure/The collection bag is below the level of the patient and urinary bladder

## 2023-01-21 DIAGNOSIS — R33.9 RETENTION OF URINE, UNSPECIFIED: ICD-10-CM

## 2023-01-21 DIAGNOSIS — I72.9 ANEURYSM OF UNSPECIFIED SITE: ICD-10-CM

## 2023-01-21 DIAGNOSIS — E11.9 TYPE 2 DIABETES MELLITUS WITHOUT COMPLICATIONS: ICD-10-CM

## 2023-01-21 DIAGNOSIS — Z29.9 ENCOUNTER FOR PROPHYLACTIC MEASURES, UNSPECIFIED: ICD-10-CM

## 2023-01-21 DIAGNOSIS — R55 SYNCOPE AND COLLAPSE: ICD-10-CM

## 2023-01-21 DIAGNOSIS — E78.5 HYPERLIPIDEMIA, UNSPECIFIED: ICD-10-CM

## 2023-01-21 DIAGNOSIS — I10 ESSENTIAL (PRIMARY) HYPERTENSION: ICD-10-CM

## 2023-01-21 DIAGNOSIS — I25.10 ATHEROSCLEROTIC HEART DISEASE OF NATIVE CORONARY ARTERY WITHOUT ANGINA PECTORIS: ICD-10-CM

## 2023-01-21 LAB
GLUCOSE BLDC GLUCOMTR-MCNC: 132 MG/DL — HIGH (ref 70–99)
GLUCOSE BLDC GLUCOMTR-MCNC: 141 MG/DL — HIGH (ref 70–99)
GLUCOSE BLDC GLUCOMTR-MCNC: 165 MG/DL — HIGH (ref 70–99)
GLUCOSE BLDC GLUCOMTR-MCNC: 186 MG/DL — HIGH (ref 70–99)
GLUCOSE BLDC GLUCOMTR-MCNC: 216 MG/DL — HIGH (ref 70–99)
HCT VFR BLD CALC: 40.1 % — SIGNIFICANT CHANGE UP (ref 39–50)
HGB BLD-MCNC: 12.4 G/DL — LOW (ref 13–17)
MCHC RBC-ENTMCNC: 25.8 PG — LOW (ref 27–34)
MCHC RBC-ENTMCNC: 30.9 GM/DL — LOW (ref 32–36)
MCV RBC AUTO: 83.5 FL — SIGNIFICANT CHANGE UP (ref 80–100)
NRBC # BLD: 0 /100 WBCS — SIGNIFICANT CHANGE UP (ref 0–0)
PLATELET # BLD AUTO: 403 K/UL — HIGH (ref 150–400)
RBC # BLD: 4.8 M/UL — SIGNIFICANT CHANGE UP (ref 4.2–5.8)
RBC # FLD: 13 % — SIGNIFICANT CHANGE UP (ref 10.3–14.5)
WBC # BLD: 13 K/UL — HIGH (ref 3.8–10.5)
WBC # FLD AUTO: 13 K/UL — HIGH (ref 3.8–10.5)

## 2023-01-21 PROCEDURE — 99222 1ST HOSP IP/OBS MODERATE 55: CPT

## 2023-01-21 PROCEDURE — 99221 1ST HOSP IP/OBS SF/LOW 40: CPT

## 2023-01-21 PROCEDURE — 93931 UPPER EXTREMITY STUDY: CPT | Mod: 26,RT

## 2023-01-21 PROCEDURE — 93931 UPPER EXTREMITY STUDY: CPT | Mod: 26,77,RT

## 2023-01-21 RX ORDER — SODIUM CHLORIDE 9 MG/ML
1000 INJECTION INTRAMUSCULAR; INTRAVENOUS; SUBCUTANEOUS
Refills: 0 | Status: DISCONTINUED | OUTPATIENT
Start: 2023-01-21 | End: 2023-01-22

## 2023-01-21 RX ORDER — INFLUENZA VIRUS VACCINE 15; 15; 15; 15 UG/.5ML; UG/.5ML; UG/.5ML; UG/.5ML
0.7 SUSPENSION INTRAMUSCULAR ONCE
Refills: 0 | Status: DISCONTINUED | OUTPATIENT
Start: 2023-01-21 | End: 2023-01-22

## 2023-01-21 RX ORDER — SODIUM CHLORIDE 9 MG/ML
500 INJECTION INTRAMUSCULAR; INTRAVENOUS; SUBCUTANEOUS ONCE
Refills: 0 | Status: COMPLETED | OUTPATIENT
Start: 2023-01-21 | End: 2023-01-21

## 2023-01-21 RX ORDER — TAMSULOSIN HYDROCHLORIDE 0.4 MG/1
0.4 CAPSULE ORAL AT BEDTIME
Refills: 0 | Status: DISCONTINUED | OUTPATIENT
Start: 2023-01-21 | End: 2023-01-22

## 2023-01-21 RX ADMIN — SODIUM CHLORIDE 500 MILLILITER(S): 9 INJECTION INTRAMUSCULAR; INTRAVENOUS; SUBCUTANEOUS at 13:30

## 2023-01-21 RX ADMIN — Medication 80 MILLIGRAM(S): at 21:19

## 2023-01-21 RX ADMIN — TAMSULOSIN HYDROCHLORIDE 0.4 MILLIGRAM(S): 0.4 CAPSULE ORAL at 10:40

## 2023-01-21 RX ADMIN — Medication 81 MILLIGRAM(S): at 05:48

## 2023-01-21 RX ADMIN — Medication 2: at 17:18

## 2023-01-21 RX ADMIN — LOSARTAN POTASSIUM 100 MILLIGRAM(S): 100 TABLET, FILM COATED ORAL at 05:48

## 2023-01-21 RX ADMIN — CLOPIDOGREL BISULFATE 75 MILLIGRAM(S): 75 TABLET, FILM COATED ORAL at 05:48

## 2023-01-21 RX ADMIN — SODIUM CHLORIDE 100 MILLILITER(S): 9 INJECTION INTRAMUSCULAR; INTRAVENOUS; SUBCUTANEOUS at 15:19

## 2023-01-21 NOTE — CONSULT NOTE ADULT - ATTENDING COMMENTS
s/p cardiac cath with right radial pseudo s/p compression by team  repeat duplex performed.   < from: US Duplex Arterial Upper Ext Ltd, Right (01.21.23 @ 14:55) >  < end of copied text >    Denies pain. no numbness, no motor loss. palp radial.  No additional intervention needed. Stable for DC from vascular standpoint. Will plan for outpatient follow up. Patient instructed to call with any symptoms of pain, numbness, worsening swelling.

## 2023-01-21 NOTE — PROVIDER CONTACT NOTE (OTHER) - SITUATION
Pt's Rt Radial still swollen s/p cardiac cath w/ stent to Cx
Pt complain of lower abdominal pain and unable to urinate.

## 2023-01-21 NOTE — CONSULT NOTE ADULT - ATTENDING COMMENTS
Patient seen and examined. Agree with assessment and plan as outlined above. Continue DAPT. Statin as outlined above. Vascular follow up. Denies pain or numbness in right hand. Discharge planning.

## 2023-01-21 NOTE — PROGRESS NOTE ADULT - PROBLEM SELECTOR PLAN 2
Site assessed by Cards fellow and Vascular overnight   s/p multiple manual compression and trial of band application     Appreciate Vascular recs   Manual pressure further applied by Vascular at bedside  US confirmed a small PSA  Awaiting official US report Site assessed by Cards fellow and Vascular overnight   s/p multiple manual compression and trial of band application     Appreciate Vascular recs   Manual pressure further applied by Vascular at bedside  US verbally confirmed a small PSA  Awaiting official US report Site assessed by Cards fellow and Vascular overnight   s/p multiple manual compression and trial of band application   Urgent US performed showing partially thrombosed PSA at RRA  Manual pressure further applied by Vascular at bedside following by band application for an hour   The access site re-assessed after band removal at 3 AM with clinical improvement   Close monitoring at the access site  Pending follow-up US

## 2023-01-21 NOTE — CONSULT NOTE ADULT - ASSESSMENT
72 year old male with PMH DM (T2), HTN, HLD, presents as an outpatient elective cath in the setting of months long dyspnea on exertion. + Coronary CTA, ultimately s/p LHC with 1 ZHOU placed to proximal Cx (80%) and POBA to OM1 (70%) via RRA on 1/20/23  Procedure complicated by pseudoaneurysm, tended to by vascular and with serial compression appears to be resolving.  Interval Events include urinary retention, for which s/p catheter insertion by urology and initiation of flomax. Thereafter, with symptomatic hypotension, anticipate iatrogenic from [id number] and degree of vasovagal. He has responded appropriately to fluids    Impression  CAD s/p ZHOU to proxLAD, poba to om1  pseudoaneurysm RRA  HTN, HLD  Orthostatic hypotension  Urinary retention     Plan:   - Appreciate vascular assistance for pseudoaneurysm, s/p multiple manual compressions and interval duplex with pseudoaneurysm tract without pseudoaneurysm  - Continue DAPT.  - Pravastatin should be swapped for atorva 80  - Continue losartan 100mg, flomax for now now that he has been volume resuscitated. Holding parameters < 100s systolic   72 year old male with PMH DM (T2), HTN, HLD, presents as an outpatient elective cath in the setting of months long dyspnea on exertion. + Coronary CTA, ultimately s/p LHC with 1 ZHOU placed to proximal Cx (80%) and POBA to OM1 (70%) via RRA on 1/20/23  Procedure complicated by pseudoaneurysm, tended to by vascular and with serial compression appears to be resolving.  Interval Events include urinary retention, for which s/p catheter insertion by urology and initiation of flomax. Thereafter, with symptomatic hypotension, anticipate iatrogenic from [id number] and degree of vasovagal. He has responded appropriately to fluids    Impression  CAD s/p ZHOU to proxLAD, poba to om1  pseudoaneurysm RRA  HTN, HLD  Orthostatic hypotension  Urinary retention     Plan:   - Appreciate vascular assistance for pseudoaneurysm, s/p multiple manual compressions and interval duplex with pseudoaneurysm tract without pseudoaneurysm  - Continue DAPT.  - Pravastatin should be swapped for atorva 80  - Continue losartan 100mg, flomax for now now that he has been volume resuscitated. Holding parameters < 100s systolic  - TTE would be reasonable to rule out flow limiting lesions, valvular stenoses, dynamic outflow tract obstruction, etc

## 2023-01-21 NOTE — PROGRESS NOTE ADULT - SUBJECTIVE AND OBJECTIVE BOX
[facility] INVASIVE CARDIOLOGY- (Roger Donovan, Sammy, Latisha, Zay, Melissa, Telly, Scottie, Brad)   CARDIAC [facility], [facility] TEAM   [phone]      CHIEF COMPLAINT: Patient is a 72y old  Male who presents with a chief complaint of chest pain     HPI:  72 year old male with PMH DM (T2), HTN, HLD. Pt reports exertional CP which resolves with rest.     < from: CT Angio Heart and Coronaries w/ IV Cont (01.12.23 @ 16:39) >  IMPRESSION:  Coronary CTA: Left coronary artery dominance.  Multifocal significant stenoses of the OM1 (severe), proximal LAD (moderate), D1 (moderate), and mid LCX (borderline mild to moderate). Distal left main disease approximately 30-40% by area.  < end of copied text >    < from: CT Heart Calcium Score (12.23.22 @ 18:05) >  IMPRESSION:  Calculated Agatston score of 695  < end of copied text >    12/22/22 TTE:   EF 70%  borderline septal LVH  prominent septal bulge measuring 1.4 cm. false tendon in LV cavity (normal variant)  grade 1 diastolic dysfunction  trace AI, mild MR, mild TR  aortic root at sinus of valsalva borderline dilated  no pericardial effusion    Pt referred by Dr Gibbons to Dr Duran for LHC. Denies CP, SOB, palpitations, N/V, fever/chills, abd pain, numbness/tingling/weakness, other c/o at this time.      (20 Jan 2023 11:13)      Subjective/Observations:   Patient feels well - no current complaints- Denies chest pain, SOB, palpitation, N/V  No pain, numbness, tingling or swelling around the access site   He had a difficulty urinating in the evening, but discomfort resolved after Urology placed a holcomb cath  Vascular is at bedside holding manual pressure on R wrist       Review of Systems all WNL except below indicated:    Constitutional: [ ] Fever [ ] Chills [ ] Fatigue [ ] Weight change   HEENT: [ ] Blurred vision [ ] Eye Pain [ ] Headache [ ] Runny nose [ ] Sore Throat   Respiratory: [ ] Cough [ ] Wheezing [ ] Shortness of breath  Cardiovascular: [ ] Chest Pain [ ] Palpitations [ ] LOZANO [ ] PND [ ] Orthopnea  Gastrointestinal: [ ] Abdominal Pain [ ] Diarrhea [ ] Constipation [ ] Hemorrhoids [ ] Nausea [ ] Vomiting  Genitourinary: [ ] Nocturia [ ] Dysuria [ ] Incontinence  Extremities: [ ] Swelling [ ] Joint Pain  Neurologic: [ ] Focal deficit [ ] Paresthesias [ ] Syncope  Lymphatic: [ ] Swelling [ ] Lymphadenopathy   Skin: [ ] Rash [ ] Ecchymoses [ ] Wounds [ ] Lesions  Psychiatry: [ ] Depression [ ] Suicidal/Homicidal Ideation [ ] Anxiety [ ] Sleep Disturbances  [x ] 10 point review of systems is otherwise negative except as mentioned above            [ ]Unable to obtain    MEDICATIONS  (STANDING):  aspirin enteric coated 81 milliGRAM(s) Oral daily  clopidogrel Tablet 75 milliGRAM(s) Oral daily  dextrose 5%. 1000 milliLiter(s) (50 mL/Hr) IV Continuous <Continuous>  dextrose 5%. 1000 milliLiter(s) (100 mL/Hr) IV Continuous <Continuous>  dextrose 50% Injectable 25 Gram(s) IV Push once  dextrose 50% Injectable 12.5 Gram(s) IV Push once  dextrose 50% Injectable 25 Gram(s) IV Push once  glucagon  Injectable 1 milliGRAM(s) IntraMuscular once  insulin lispro (ADMELOG) corrective regimen sliding scale   SubCutaneous three times a day before meals  insulin lispro (ADMELOG) corrective regimen sliding scale   SubCutaneous at bedtime  losartan 100 milliGRAM(s) Oral daily  pravastatin 80 milliGRAM(s) Oral at bedtime  sodium chloride 0.9%. 1000 milliLiter(s) (125 mL/Hr) IV Continuous <Continuous>  sodium chloride 0.9%. 1000 milliLiter(s) (75 mL/Hr) IV Continuous <Continuous>    MEDICATIONS  (PRN):  dextrose Oral Gel 15 Gram(s) Oral once PRN Blood Glucose LESS THAN 70 milliGRAM(s)/deciliter      Allergies    No Known Allergies    Intolerances        Vital Signs Last 24 Hrs  T(C): 36.4 (20 Jan 2023 19:55), Max: 37.1 (20 Jan 2023 13:40)  T(F): 97.5 (20 Jan 2023 19:55), Max: 98.8 (20 Jan 2023 13:40)  HR: 100 (20 Jan 2023 20:10) (76 - 108)  BP: 135/93 (20 Jan 2023 20:10) (129/69 - 184/104)  BP(mean): 97 (20 Jan 2023 20:10) (85 - 128)  RR: 20 (20 Jan 2023 20:10) (15 - 22)  SpO2: 100% (20 Jan 2023 20:10) (97% - 100%)    Parameters below as of 20 Jan 2023 20:10  Patient On (Oxygen Delivery Method): room air      FOCUSED PHYSICAL EXAM:  Cardiovascular: Regular, S1 and S2, No murmurs, rubs, gallops or clicks  Pulmonary: Non-labored, breath sounds are clear bilaterally, No wheezing, rales or rhonchi  cath site: Right radial artery access - pulsatile about 2x2 cm mass right above the puncture site with mild edema expanding to forearm. Soft. +Pulses. Non-tender    LABS: All Labs Reviewed:                        11.3   8.49  )-----------( 401      ( 20 Jan 2023 11:18 )             36.3     20 Jan 2023 11:18    139    |  104    |  17     ----------------------------<  119    4.7     |  20     |  1.25     Ca    9.4        20 Jan 2023 11:18     [facility] INVASIVE CARDIOLOGY- (Roger Donovan, Sammy, Latisha, Zay, Melissa, Telly, Scottie, Brad)   CARDIAC [facility], [facility] TEAM   [phone]      CHIEF COMPLAINT: Patient is a 72y old  Male who presents with a chief complaint of chest pain     HPI:  72 year old male with PMH DM (T2), HTN, HLD. Pt reports exertional CP which resolves with rest.     < from: CT Angio Heart and Coronaries w/ IV Cont (01.12.23 @ 16:39) >  IMPRESSION:  Coronary CTA: Left coronary artery dominance.  Multifocal significant stenoses of the OM1 (severe), proximal LAD (moderate), D1 (moderate), and mid LCX (borderline mild to moderate). Distal left main disease approximately 30-40% by area.  < end of copied text >    < from: CT Heart Calcium Score (12.23.22 @ 18:05) >  IMPRESSION:  Calculated Agatston score of 695  < end of copied text >    12/22/22 TTE:   EF 70%  borderline septal LVH  prominent septal bulge measuring 1.4 cm. false tendon in LV cavity (normal variant)  grade 1 diastolic dysfunction  trace AI, mild MR, mild TR  aortic root at sinus of valsalva borderline dilated  no pericardial effusion    Pt referred by Dr Gibbons to Dr Duran for LHC. Denies CP, SOB, palpitations, N/V, fever/chills, abd pain, numbness/tingling/weakness, other c/o at this time.      (20 Jan 2023 11:13)      Subjective/Observations:   Patient feels well - no current complaints- Denies chest pain, SOB, palpitation, N/V  No pain, numbness, tingling or swelling around the access site   He had a difficulty urinating in the evening, but discomfort resolved after Urology placed a holcomb cath  Vascular is at bedside holding manual pressure on R wrist       Review of Systems all WNL except below indicated:    Constitutional: [ ] Fever [ ] Chills [ ] Fatigue [ ] Weight change   HEENT: [ ] Blurred vision [ ] Eye Pain [ ] Headache [ ] Runny nose [ ] Sore Throat   Respiratory: [ ] Cough [ ] Wheezing [ ] Shortness of breath  Cardiovascular: [ ] Chest Pain [ ] Palpitations [ ] LOZANO [ ] PND [ ] Orthopnea  Gastrointestinal: [ ] Abdominal Pain [ ] Diarrhea [ ] Constipation [ ] Hemorrhoids [ ] Nausea [ ] Vomiting  Genitourinary: [ ] Nocturia [ ] Dysuria [ ] Incontinence  Extremities: [ ] Swelling [ ] Joint Pain  Neurologic: [ ] Focal deficit [ ] Paresthesias [ ] Syncope  Lymphatic: [ ] Swelling [ ] Lymphadenopathy   Skin: [ ] Rash [ ] Ecchymoses [ ] Wounds [ ] Lesions  Psychiatry: [ ] Depression [ ] Suicidal/Homicidal Ideation [ ] Anxiety [ ] Sleep Disturbances  [x ] 10 point review of systems is otherwise negative except as mentioned above            [ ]Unable to obtain    MEDICATIONS  (STANDING):  aspirin enteric coated 81 milliGRAM(s) Oral daily  clopidogrel Tablet 75 milliGRAM(s) Oral daily  dextrose 5%. 1000 milliLiter(s) (50 mL/Hr) IV Continuous <Continuous>  dextrose 5%. 1000 milliLiter(s) (100 mL/Hr) IV Continuous <Continuous>  dextrose 50% Injectable 25 Gram(s) IV Push once  dextrose 50% Injectable 12.5 Gram(s) IV Push once  dextrose 50% Injectable 25 Gram(s) IV Push once  glucagon  Injectable 1 milliGRAM(s) IntraMuscular once  insulin lispro (ADMELOG) corrective regimen sliding scale   SubCutaneous three times a day before meals  insulin lispro (ADMELOG) corrective regimen sliding scale   SubCutaneous at bedtime  losartan 100 milliGRAM(s) Oral daily  pravastatin 80 milliGRAM(s) Oral at bedtime  sodium chloride 0.9%. 1000 milliLiter(s) (125 mL/Hr) IV Continuous <Continuous>  sodium chloride 0.9%. 1000 milliLiter(s) (75 mL/Hr) IV Continuous <Continuous>    MEDICATIONS  (PRN):  dextrose Oral Gel 15 Gram(s) Oral once PRN Blood Glucose LESS THAN 70 milliGRAM(s)/deciliter      Allergies    No Known Allergies    Intolerances        Vital Signs Last 24 Hrs  T(C): 36.4 (20 Jan 2023 19:55), Max: 37.1 (20 Jan 2023 13:40)  T(F): 97.5 (20 Jan 2023 19:55), Max: 98.8 (20 Jan 2023 13:40)  HR: 100 (20 Jan 2023 20:10) (76 - 108)  BP: 135/93 (20 Jan 2023 20:10) (129/69 - 184/104)  BP(mean): 97 (20 Jan 2023 20:10) (85 - 128)  RR: 20 (20 Jan 2023 20:10) (15 - 22)  SpO2: 100% (20 Jan 2023 20:10) (97% - 100%)    Parameters below as of 20 Jan 2023 20:10  Patient On (Oxygen Delivery Method): room air      FOCUSED PHYSICAL EXAM:  Cardiovascular: Regular, S1 and S2, No murmurs, rubs, gallops or clicks  Pulmonary: Non-labored, breath sounds are clear bilaterally, No wheezing, rales or rhonchi  cath site: Right radial artery access - pulsatile about 2x1 cm mass at the puncture site with mild edema expanding to forearm. Soft. +Pulses. Non-tender    LABS: All Labs Reviewed:                        11.3   8.49  )-----------( 401      ( 20 Jan 2023 11:18 )             36.3     20 Jan 2023 11:18    139    |  104    |  17     ----------------------------<  119    4.7     |  20     |  1.25     Ca    9.4        20 Jan 2023 11:18

## 2023-01-21 NOTE — CHART NOTE - NSCHARTNOTEFT_GEN_A_CORE
HPI:  72 year old male with PMH DM (T2), HTN, HLD. Pt reports exertional CP which resolves with rest.     < from: CT Angio Heart and Coronaries w/ IV Cont (01.12.23 @ 16:39) >  IMPRESSION:  Coronary CTA: Left coronary artery dominance.  Multifocal significant stenoses of the OM1 (severe), proximal LAD (moderate), D1 (moderate), and mid LCX (borderline mild to moderate). Distal left main disease approximately 30-40% by area.  < end of copied text >    < from: CT Heart Calcium Score (12.23.22 @ 18:05) >  IMPRESSION:  Calculated Agatston score of 695  < end of copied text >    12/22/22 TTE:   EF 70%  borderline septal LVH  prominent septal bulge measuring 1.4 cm. false tendon in LV cavity (normal variant)  grade 1 diastolic dysfunction  trace AI, mild MR, mild TR  aortic root at sinus of valsalva borderline dilated  no pericardial effusion    Pt referred by Dr Gibbons to Dr Duran for LHC. Denies CP, SOB, palpitations, N/V, fever/chills, abd pain, numbness/tingling/weakness, other c/o at this time.     1/20 s/p LHC ZHOU x1 to pCx, POBA to OM via RRA  - patient received w/ radial band in place, put back on during day shift for noted lump on wrist as received in report  - removed band and noted soft pulsatile 1 cm x 1 cm bump on wrist without resolution  - patient without complaint, no hematoma, denies any pain, numbness or tingling. positive radial, ulnar and grayson pulses, no discoloration and areas all around site without hematoma  - cards fellow Pelon Luna called and at bedside assessing patient- notified Dr. Duran  - vascular called, ultrasound of right extremity ordered- results show partially thrombosed pseudoaneurysm extending from distal right radial artery at cath access site  - vascular Dr. Nava, at bedside, applied 45 minutes of direct pressure applied proximal and distal to right radial puncture site  - will repeat duplex as ordered  - report given to [facility] day ACP to follow up and continue care    Garett Gonzalez  Invasive Cardiology  Ext [phone]

## 2023-01-21 NOTE — PROGRESS NOTE ADULT - PROBLEM SELECTOR PLAN 2
cath complicated by pseudoaneursym  -greatly appreciate vascular surgery helping w/ pseudoaneursym  -repeat US w/o pseudoaneurysm (done 1/21 in the PM)

## 2023-01-21 NOTE — CONSULT NOTE ADULT - SUBJECTIVE AND OBJECTIVE BOX
72M with PMH DM (T2), HTN, HLD. Pt reports exertional CP which resolves with rest. Pt s/p 1/21 LHC via RRA. Pulsatile hematoma was noted at puncture site after TR band was removed. TR band was reapplied and vascular surgery was consulted.     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 1000 milliLiter(s) (50 mL/Hr) IV Continuous <Continuous>  dextrose 5%. 1000 milliLiter(s) (100 mL/Hr) IV Continuous <Continuous>  dextrose 50% Injectable 25 Gram(s) IV Push once  dextrose 50% Injectable 12.5 Gram(s) IV Push once  dextrose 50% Injectable 25 Gram(s) IV Push once  glucagon  Injectable 1 milliGRAM(s) IntraMuscular once  insulin lispro (ADMELOG) corrective regimen sliding scale   SubCutaneous three times a day before meals  insulin lispro (ADMELOG) corrective regimen sliding scale   SubCutaneous at bedtime  losartan 100 milliGRAM(s) Oral daily  pravastatin 80 milliGRAM(s) Oral at bedtime  sodium chloride 0.9%. 1000 milliLiter(s) (125 mL/Hr) IV Continuous <Continuous>  sodium chloride 0.9%. 1000 milliLiter(s) (75 mL/Hr) IV Continuous <Continuous>    MEDICATIONS  (PRN):  dextrose Oral Gel 15 Gram(s) Oral once PRN Blood Glucose LESS THAN 70 milliGRAM(s)/deciliter      Allergies    No Known Allergies    Intolerances      Physical Exam:  General: NAD, resting comfortably  HEENT: NC/AT, EOMI, normal hearing, no oral lesions, no LAD, neck supple  Pulmonary: normal resp effort, patent airway  Extremities: b/l UE palpable pulses, strength and sensation intact  R radial puncture site: small pulsatile hematoma with surrounding collection  Neuro: A/O x 3, CNs II-XII grossly intact, normal sensation, no focal deficits      Vital Signs Last 24 Hrs  T(C): 36.2 (21 Jan 2023 00:00), Max: 37.1 (20 Jan 2023 13:40)  T(F): 97.2 (21 Jan 2023 00:00), Max: 98.8 (20 Jan 2023 13:40)  HR: 84 (21 Jan 2023 03:00) (76 - 108)  BP: 135/82 (21 Jan 2023 03:00) (129/69 - 184/104)  BP(mean): 98 (21 Jan 2023 03:00) (85 - 128)  RR: 18 (21 Jan 2023 03:00) (15 - 22)  SpO2: 100% (21 Jan 2023 03:00) (97% - 100%)    Parameters below as of 21 Jan 2023 03:00  Patient On (Oxygen Delivery Method): room air        I&O's Summary    20 Jan 2023 07:01  -  21 Jan 2023 03:36  --------------------------------------------------------  IN: 660 mL / OUT: 1700 mL / NET: -1040 mL            LABS:                        11.3   8.49  )-----------( 401      ( 20 Jan 2023 11:18 )             36.3     01-20    139  |  104  |  17  ----------------------------<  119<H>  4.7   |  20<L>  |  1.25    Ca    9.4      20 Jan 2023 11:18    POCT Blood Glucose.: 180 mg/dL (20 Jan 2023 21:08)  POCT Blood Glucose.: 121 mg/dL (20 Jan 2023 16:16)  POCT Blood Glucose.: 98 mg/dL (20 Jan 2023 13:46)    RADIOLOGY & ADDITIONAL STUDIES:  < from: VA Duplex Upper Extrem Arterial Limited, Right (01.21.23 @ 00:13) >  ACC: [id number] EXAM:  DUPLEX UP EXT UNI LTD RT   ORDERED BY: ASHLEY WELLINGTON     PROCEDURE DATE:  01/21/2023      < end of copied text >  < from: VA Duplex Upper Extrem Arterial Limited, Right (01.21.23 @ 00:13) >  FINDINGS:    Right radial artery, proximal: No significant stenosis. Triphasic   waveform. PSV: 50 cm/sec.    Right radial artery, mid: No significant stenosis. Triphasic waveform.   PSV: 41 cm/sec.    Right radial artery, distal: No significant stenosis. Triphasic waveform.   PSV: 41 cm/sec.    Partially thrombosed pseudoaneurysm in the distal right radial artery at   the level of the wrist measuring 1.7 x 0.8 x 0.4 cm. The neck measures   0.4 cm.    IMPRESSION:  Partially thrombosed pseudoaneurysm extending from the distal right   radial artery atthe level of the wrist catheterization access.    < end of copied text >  
CARDIOLOGY FELLOW CONSULT NOTE    HPI:  72 year old male with PMH DM (T2), HTN, HLD, presents as an outpatient elective cath in the setting of months long dyspnea on exertion. CTA coronary outpatient showing multifocal significant stenoses of the OM1 (severe), proximal LAD (moderate), D1 (moderate), and mid LCX (borderline mild to moderate). Distal left main disease approximately 30-40% by area, calculated Agatston score of 695. A 12/22 TTE showed EF 70%, borderline septal LVH, prominent septal bulge, g1DD, trace AI, mild MR, mild TR, aortic root with mild dilatation, no pericardial effusion.   Pt referred by Dr Gibbons to Dr Duran for LHC. s/p LHC - 1 ZHOU placed to proximal Cx (80%) and POBA to OM1 (70%) via RRA on 1/20/23. Procedure complicated by pseudoaneurysm of vascular access site, s/p multiple manual compression and trial of band application. An urgent US was performed showing partially thrombosed PSA at RRA.  Manual pressure further applied by vascular at bedside followed by band application for an hour.     Duplex UE:  FINDINGS:  No definitive pseudoaneurysm is visualized. Previously identified tract   to the prior pseudoaneurysm is seen at the right distal radial artery.  Triphasic waveforms in the radial artery without occlusion.    Also this hospitalization, urology called for urinary retention, sp urinary catheter placement. Flomax was started.   @ 14:57, with vasovagal symptoms with BP dropping to SBP 80's, given saline bolus with improvement.    At bedside, patient is without complaint. No further swelling of his R wrist, and pulse is intact and strength is as well. He is to follow with urology and cardiology as an outpatient.     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 1000 milliLiter(s) IV Continuous <Continuous>  dextrose 5%. 1000 milliLiter(s) IV Continuous <Continuous>  dextrose 50% Injectable 25 Gram(s) IV Push once  dextrose 50% Injectable 12.5 Gram(s) IV Push once  dextrose 50% Injectable 25 Gram(s) IV Push once  dextrose Oral Gel 15 Gram(s) Oral once PRN  glucagon  Injectable 1 milliGRAM(s) IntraMuscular once  influenza  Vaccine (HIGH DOSE) 0.7 milliLiter(s) IntraMuscular once  insulin lispro (ADMELOG) corrective regimen sliding scale   SubCutaneous three times a day before meals  insulin lispro (ADMELOG) corrective regimen sliding scale   SubCutaneous at bedtime  losartan 100 milliGRAM(s) Oral daily  pravastatin 80 milliGRAM(s) Oral at bedtime  sodium chloride 0.9%. 1000 milliLiter(s) IV Continuous <Continuous>  sodium chloride 0.9%. 1000 milliLiter(s) IV Continuous <Continuous>  sodium chloride 0.9%. 1000 milliLiter(s) IV Continuous <Continuous>  tamsulosin 0.4 milliGRAM(s) Oral at bedtime    REVIEW OF SYSTEMS: 14pt ros neg unless stated above    Physical Exam:  T(F): 98.9 (01-21), Max: 98.9 (01-21)  HR: 80 (01-21) (56 - 109)  BP: 120/65 (01-21) (68/57 - 164/82)  RR: 18 (01-21)  SpO2: 98% (01-21)  GENERAL: No acute distress, well-developed  HEAD:  Atraumatic, Normocephalic  ENT: EOMI, PERRLA, conjunctiva and sclera clear, Neck supple, No JVD, moist mucosa  CHEST/LUNG: Clear to auscultation bilaterally; No wheeze, equal breath sounds bilaterally   BACK: No spinal tenderness  HEART: Regular rate and rhythm; No murmurs, rubs, or gallops  ABDOMEN: Soft, Nontender, Nondistended; Bowel sounds present  EXTREMITIES: bilateral upper extremity pulses. R radial puncture site with gauze covering, no further pulsatile hematoma. Good strength   PSYCH: Nl behavior, nl affect  NEUROLOGY: AAOx3, non-focal, cranial nerves intact  SKIN: Normal color, No rashes or lesions    ECG: sinus rhythm, normal axis, no significant ST deviations     Cath: s/p LHC - 1 ZHOU placed to proximal Cx (80%) and POBA to OM1 (70%) via RRA on 1/20/23    Labs: Personally reviewed                        12.4   13.00 )-----------( 403      ( 21 Jan 2023 10:31 )             40.1     01-20    139  |  104  |  17  ----------------------------<  119<H>  4.7   |  20<L>  |  1.25    Ca    9.4      20 Jan 2023 11:18

## 2023-01-21 NOTE — CHART NOTE - NSCHARTNOTEFT_GEN_A_CORE
HPI:  72 year old male with PMH DM (T2), HTN, HLD. Pt reports exertional CP which resolves with rest.       1/20: s/p PCI to pCx x1 ZHOU and POBA to OM1 via RRA  urology called overnight for urinary retention- holcomb cath inserted by urology (failed attempt from primary team)    1/21 patient OOB to standing, called to the bedside by RN for blood noted around the meatus, blood was bright red, small amount. When cleaning the area no further bleeding noted on or around the meatus.   CBC sent  urology contacted, spoke with fellow/ resident: recommends to keep the urinary cath in place until seen as o/p at the [facility]. Flomax started today  continue to monitor site for further bleeding

## 2023-01-21 NOTE — PROVIDER CONTACT NOTE (OTHER) - ACTION/TREATMENT ORDERED:
Provider assessed Pt bedside, cardiology and Vascular called and assessed Pt bedside, VA Duplex ordered and showed pseudoaneurysm. Vascular MD held pressure and provider reapplied Radial band.
Providers bedside, Pt bladder scanned. >430cc of urine in bladder. Straight cath ordered, unable to advance cath, Urology called and placed coude Wilder, 700cc of urine drained.

## 2023-01-21 NOTE — PATIENT PROFILE ADULT - FUNCTIONAL ASSESSMENT - DAILY ACTIVITY 5.
· Evaluated by Psychiatry on this admission  · Continue Zoloft 150 mg daily  · Seroquel 25 mg q h s  4 = No assist / stand by assistance

## 2023-01-21 NOTE — CONSULT NOTE ADULT - ASSESSMENT
72M with PMH DM (T2), HTN, HLD. Pt reports exertional CP which resolves with rest. Pt s/p 1/21 LHC via RRA. Pulsatile hematoma was noted at puncture site after TR band was removed. TR band was reapplied and vascular surgery was consulted.     PLAN  - 45min of direct pressure applied proximal and distal to right radial puncture site  - Repeat arterial duplex  - Care per primary    Discussed with Dr. Rubin on behalf of Dr. aNva    Vascular Surgery  [id number]

## 2023-01-21 NOTE — CHART NOTE - NSCHARTNOTEFT_GEN_A_CORE
HPI:  72 year old male with PMH DM (T2), HTN, HLD. Pt reports exertional CP which resolves with rest.     1/20  Pt s/p PCI to pCx and MAXIMILIANO to Om1 via RRA c/b Pulsatile hematoma which was noted at puncture site after radial band was removed   TR band was reapplied and vascular surgery was consulted  -ultrasound of right extremity ordered- results show partially thrombosed pseudoaneurysm extending from distal right radial artery at cath access site  - vascular team applied 45 minutes of direct pressure applied proximal and distal to right radial puncture site    1/21 repeat US arterial pending [ ]  - called to the bedside by RN and patients spouse, pt c/o feeling dizzy  patient was diaphoretic, BP dropped to SBP 80's  patient placed back into bed, Saline bolus given- BP responded appropriately   patient was able to follow commands alert and orients x3  othrostatics done  HR elevated with ambulation and now with rest  gentle hydration for now  admit to tele overnight for further observation  repeat labs in AM for leukocytosis  follow up on US arterial duplex HPI:  72 year old male with PMH DM (T2), HTN, HLD. Pt reports exertional CP which resolves with rest.     1/20  Pt s/p PCI to pCx and MAXIMILIANO to Om1 via RRA c/b Pulsatile hematoma which was noted at puncture site after radial band was removed   TR band was reapplied and vascular surgery was consulted  -ultrasound of right extremity ordered- results show partially thrombosed pseudoaneurysm extending from distal right radial artery at cath access site  - vascular team applied 45 minutes of direct pressure applied proximal and distal to right radial puncture site    1/21 repeat US arterial pending [ ]  right wrist now soft, ecchymotic, slight tender, +2 pulse- patient denies pain at the site     1/21  called to the bedside by RN and patients spouse, pt c/o feeling dizzy  patient was diaphoretic, BP dropped to SBP 80's  patient placed back into bed, Saline bolus given- BP responded appropriately   patient was able to follow commands alert and orients x3  orthostatics done  HR elevated with ambulation and now with rest  gentle hydration for now  admit to tele overnight for further observation  repeat labs in AM for leukocytosis  follow up on US arterial duplex

## 2023-01-21 NOTE — PROGRESS NOTE ADULT - SUBJECTIVE AND OBJECTIVE BOX
CARIE HAYS  72y  Male      Patient is a 72y old  Male who presents with a chief complaint of chest pain (21 Jan 2023 01:20)  72yom w/hx of T2DM, HTN, HLD, who presented initially to [facility] for elective cardiac cath. Patient was in good health, but has been having increased chest pain upon exertion. He is s/p LHC - 1 drug eluting stent placed to proximal Cx (80%) and POBA to OM1 (70%) via RRA on 1/20/23 with Dr. Duran. This was complicated by hematoma at the right radial sight, causing pseudoaneurysm, vascular was called and held pressure >45 minutes. Course was alos complicated by urinary retention and now has a holcomb. Course was also complicated by vasovagal-like syncope and has gotten fluids.           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 No nausea, vomiting, or hematemesis; No diarrhea or constipation. No melena or hematochezia.  GENITOURINARY: No dysuria, frequency, hematuria, or incontinence  NEUROLOGICAL: No headaches, memory loss, loss of strength, numbness, or tremors  SKIN: No itching, burning, rashes, or lesions   LYMPH NODES: No enlarged glands  ENDOCRINE: No heat or cold intolerance; No hair loss  MUSCULOSKELETAL: No joint pain or swelling; No muscle, back, or extremity pain  PSYCHIATRIC: No depression, anxiety, mood swings, or difficulty sleeping  HEME/LYMPH: No easy bruising, or bleeding gums  ALLERY AND IMMUNOLOGIC: No hives or eczema    T(C): 36.8 (01-21-23 @ 08:10), Max: 37.2 (01-21-23 @ 05:00)  HR: 80 (01-21-23 @ 14:15) (56 - 109)  BP: 114/66 (01-21-23 @ 14:15) (68/57 - 184/104)  RR: 18 (01-21-23 @ 12:01) (18 - 22)  SpO2: 99% (01-21-23 @ 12:01) (98% - 100%)  Wt(kg): --Vital Signs Last 24 Hrs  T(C): 36.8 (21 Jan 2023 08:10), Max: 37.2 (21 Jan 2023 05:00)  T(F): 98.2 (21 Jan 2023 08:10), Max: 98.9 (21 Jan 2023 05:00)  HR: 80 (21 Jan 2023 14:15) (56 - 109)  BP: 114/66 (21 Jan 2023 14:15) (68/57 - 184/104)  BP(mean): 79 (21 Jan 2023 14:15) (61 - 128)  RR: 18 (21 Jan 2023 12:01) (18 - 22)  SpO2: 99% (21 Jan 2023 12:01) (98% - 100%)    Parameters below as of 21 Jan 2023 12:01  Patient On (Oxygen Delivery Method): room air        PHYSICAL EXAM:  GENERAL: NAD, well-groomed, well-developed  HEAD:  Atraumatic, Normocephalic  EYES: EOMI, PERRLA, conjunctiva and sclera clear  ENMT: No tonsillar erythema, exudates, or enlargement; Moist mucous membranes, Good dentition, No lesions  NECK: Supple, No JVD, Normal thyroid  NERVOUS SYSTEM:  Alert & Oriented X3, Good concentration; Motor Strength 5/5 B/L upper and lower extremities; DTRs 2+ intact and symmetric  CHEST/LUNG: Clear to percussion bilaterally; No rales, rhonchi, wheezing, or rubs  HEART: Regular rate and rhythm; No murmurs, rubs, or gallops  ABDOMEN: Soft, Nontender, Nondistended; Bowel sounds present  EXTREMITIES:  2+ Peripheral Pulses, No clubbing, cyanosis, or edema  LYMPH: No lymphadenopathy noted  SKIN: No rashes or lesions    Consultant(s) Notes Reviewed:  [x ] YES  [ ] NO  Care Discussed with Consultants/Other Providers [ x] YES  [ ] NO    LABS:      The Labs were reviewed by me   The Radiology was reviewed by me    EKG tracing reviewed by me    01-20    139  |  104  |  17  ----------------------------<  119<H>  4.7   |  20<L>  |  1.25    Ca    9.4      20 Jan 2023 11:18                                                    12.4   13.00 )-----------( 403      ( 21 Jan 2023 10:31 )             40.1                         11.3   8.49  )-----------( 401      ( 20 Jan 2023 11:18 )             36.3     CAPILLARY BLOOD GLUCOSE      POCT Blood Glucose.: 165 mg/dL (21 Jan 2023 13:33)  POCT Blood Glucose.: 141 mg/dL (21 Jan 2023 11:27)  POCT Blood Glucose.: 132 mg/dL (21 Jan 2023 06:57)  POCT Blood Glucose.: 180 mg/dL (20 Jan 2023 21:08)  POCT Blood Glucose.: 121 mg/dL (20 Jan 2023 16:16)            RADIOLOGY & ADDITIONAL TESTS:    Imaging Personally Reviewed:  [ ] YES  [ ] NO CARIE HAYS  72y  Male    72yom w/hx of T2DM, HTN, HLD, who presented initially to [facility] for elective cardiac cath. Patient was in good health, but has been having chest pain but not related to specific times. States he would get chest pain on exertion but not every time he exerted himself. He would have to rest and his chest pain would resolved. He normally sees Dr. Max, who did imaging, most notably a CT Coronaries which shows CAD and was told to come for an angiogram. He otherwise denied any nausea, vomiting, headaches, shortness of breath, cough, lower extremity swelling. He is s/p LHC - 1 drug eluting stent placed to proximal Cx (80%) and POBA to OM1 (70%) via RRA on 1/20/23 with Dr. Duran. This was complicated by hematoma at the right radial sight, causing pseudoaneurysm, vascular was called and held pressure >45 minutes. Course was also complicated by urinary retention and now has a holcomb. Course was also complicated by vasovagal-like syncope and has gotten fluids.       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    PAST SURGICAL HISTORY  2014 cervical area surgery     Family History: no known family history, no illnesses    Social History: Able to walk and perform all ADLs, denies smoking, etoh intake, marijuana, cocaine    REVIEW OF SYSTEMS:  CONSTITUTIONAL: No fever, weight loss, or fatigue  EYES: No eye pain, visual disturbances, or discharge  ENMT:  No difficulty hearing, tinnitus, vertigo; No sinus or throat pain  NECK: No pain or stiffness  RESPIRATORY: No cough, wheezing, chills or hemoptysis; No shortness of breath  CARDIOVASCULAR: +chest pain occasionally on exertion, no palpitations, dizziness, or leg swelling  GASTROINTESTINAL: No abdominal or epigastric pain. No nausea, vomiting, or hematemesis; No diarrhea or constipation. No melena or hematochezia.  GENITOURINARY: No dysuria, frequency, hematuria, or incontinence  NEUROLOGICAL: No headaches, memory loss, loss of strength, numbness, or tremors  SKIN: No itching, burning, rashes, or lesions   LYMPH NODES: No enlarged glands  ENDOCRINE: No heat or cold intolerance; No hair loss  MUSCULOSKELETAL: No joint pain or swelling; No muscle, back, or extremity pain  PSYCHIATRIC: No depression, anxiety, mood swings, or difficulty sleeping  HEME/LYMPH: No easy bruising, or bleeding gums  ALLERY AND IMMUNOLOGIC: No hives or eczema    T(C): 36.8 (01-21-23 @ 08:10), Max: 37.2 (01-21-23 @ 05:00)  HR: 80 (01-21-23 @ 14:15) (56 - 109)  BP: 114/66 (01-21-23 @ 14:15) (68/57 - 184/104)  RR: 18 (01-21-23 @ 12:01) (18 - 22)  SpO2: 99% (01-21-23 @ 12:01) (98% - 100%)  Wt(kg): --Vital Signs Last 24 Hrs  T(C): 36.8 (21 Jan 2023 08:10), Max: 37.2 (21 Jan 2023 05:00)  T(F): 98.2 (21 Jan 2023 08:10), Max: 98.9 (21 Jan 2023 05:00)  HR: 80 (21 Jan 2023 14:15) (56 - 109)  BP: 114/66 (21 Jan 2023 14:15) (68/57 - 184/104)  BP(mean): 79 (21 Jan 2023 14:15) (61 - 128)  RR: 18 (21 Jan 2023 12:01) (18 - 22)  SpO2: 99% (21 Jan 2023 12:01) (98% - 100%)    Parameters below as of 21 Jan 2023 12:01  Patient On (Oxygen Delivery Method): room air        PHYSICAL EXAM:  GENERAL: NAD, well-groomed, well-developed  HEAD:  Atraumatic, Normocephalic  EYES: EOMI, PERRLA, conjunctiva and sclera clear  ENMT: No tonsillar erythema, exudates, or enlargement; Moist mucous membranes, Good dentition, No lesions  NECK: Supple, No JVD, Normal thyroid  NERVOUS SYSTEM:  Alert & Oriented X3, Good concentration; Motor Strength 5/5 B/L upper and lower extremities; DTRs 2+ intact and symmetric  CHEST/LUNG: Clear to percussion bilaterally; No rales, rhonchi, wheezing, or rubs/ right radial wrist site non painful, strength and sensation is preserved on right hand   HEART: Regular rate and rhythm; No murmurs, rubs, or gallops  ABDOMEN: Soft, Nontender, Nondistended; Bowel sounds present  EXTREMITIES:  2+ Peripheral Pulses, No clubbing, cyanosis, or edema  LYMPH: No lymphadenopathy noted  SKIN: No rashes or lesions    Consultant(s) Notes Reviewed:  [x ] YES  [ ] NO  Care Discussed with Consultants/Other Providers [ x] YES  [ ] NO    LABS:  The Labs were reviewed by me   The Radiology was reviewed by me    EKG tracing reviewed by me    01-20    139  |  104  |  17  ----------------------------<  119<H>  4.7   |  20<L>  |  1.25    Ca    9.4      20 Jan 2023 11:18                        12.4   13.00 )-----------( 403      ( 21 Jan 2023 10:31 )             40.1                         11.3   8.49  )-----------( 401      ( 20 Jan 2023 11:18 )             36.3     CAPILLARY BLOOD GLUCOSE      POCT Blood Glucose.: 165 mg/dL (21 Jan 2023 13:33)  POCT Blood Glucose.: 141 mg/dL (21 Jan 2023 11:27)  POCT Blood Glucose.: 132 mg/dL (21 Jan 2023 06:57)  POCT Blood Glucose.: 180 mg/dL (20 Jan 2023 21:08)  POCT Blood Glucose.: 121 mg/dL (20 Jan 2023 16:16)    RADIOLOGY & ADDITIONAL TESTS:  < from: VA Duplex Upper Extrem Arterial Limited, Right (01.21.23 @ 00:13) >  IMPRESSION:  Partially thrombosed pseudoaneurysm extending from the distal right   radial artery atthe level of the wrist catheterization access.    Findings were discussed with provider SEVERINO Menon by ultrasound   technologist CHESTER Yeung at 12:11 AM on 1/21/2023 as documented.    < end of copied text >  < from: US Duplex Arterial Upper Ext Ltd, Right (01.21.23 @ 14:55) >  FINDINGS:  No definitive pseudoaneurysm is visualized. Previously identified tract   to the prior pseudoaneurysm is seen at the right distal radial artery.    Triphasic waveforms in the radial artery without occlusion.    IMPRESSION:  Pseudoaneurysm tract without pseudoaneurysm.    < end of copied text >    < from: CT Angio Heart and Coronaries w/ IV Cont (01.12.23 @ 16:39) >  IMPRESSION:    Coronary CTA: Left coronary arterydominance.    Multifocal significant stenoses of the OM1 (severe), proximal LAD   (moderate), D1 (moderate), and mid LCX (borderline mild to moderate).   Distal left main disease approximately 30-40% by area.    Stenosis Reference Ranges:  Minimal <25%  Mild 25-49%  Moderate 50-69%  Severe 70-99%  Occluded >99%    < end of copied text >

## 2023-01-22 ENCOUNTER — TRANSCRIPTION ENCOUNTER (OUTPATIENT)
Age: 73
End: 2023-01-22

## 2023-01-22 VITALS
TEMPERATURE: 97 F | OXYGEN SATURATION: 98 % | RESPIRATION RATE: 18 BRPM | SYSTOLIC BLOOD PRESSURE: 117 MMHG | DIASTOLIC BLOOD PRESSURE: 79 MMHG | HEART RATE: 107 BPM

## 2023-01-22 LAB
A1C WITH ESTIMATED AVERAGE GLUCOSE RESULT: 7.4 % — HIGH (ref 4–5.6)
ANION GAP SERPL CALC-SCNC: 13 MMOL/L — SIGNIFICANT CHANGE UP (ref 5–17)
BASOPHILS # BLD AUTO: 0.04 K/UL — SIGNIFICANT CHANGE UP (ref 0–0.2)
BASOPHILS NFR BLD AUTO: 0.4 % — SIGNIFICANT CHANGE UP (ref 0–2)
BUN SERPL-MCNC: 14 MG/DL — SIGNIFICANT CHANGE UP (ref 7–23)
CALCIUM SERPL-MCNC: 9.6 MG/DL — SIGNIFICANT CHANGE UP (ref 8.4–10.5)
CHLORIDE SERPL-SCNC: 101 MMOL/L — SIGNIFICANT CHANGE UP (ref 96–108)
CO2 SERPL-SCNC: 21 MMOL/L — LOW (ref 22–31)
CREAT SERPL-MCNC: 1.21 MG/DL — SIGNIFICANT CHANGE UP (ref 0.5–1.3)
EGFR: 63 ML/MIN/1.73M2 — SIGNIFICANT CHANGE UP
EOSINOPHIL # BLD AUTO: 0.21 K/UL — SIGNIFICANT CHANGE UP (ref 0–0.5)
EOSINOPHIL NFR BLD AUTO: 1.9 % — SIGNIFICANT CHANGE UP (ref 0–6)
ESTIMATED AVERAGE GLUCOSE: 166 MG/DL — HIGH (ref 68–114)
GLUCOSE BLDC GLUCOMTR-MCNC: 141 MG/DL — HIGH (ref 70–99)
GLUCOSE BLDC GLUCOMTR-MCNC: 168 MG/DL — HIGH (ref 70–99)
GLUCOSE SERPL-MCNC: 134 MG/DL — HIGH (ref 70–99)
HCT VFR BLD CALC: 38.6 % — LOW (ref 39–50)
HGB BLD-MCNC: 11.9 G/DL — LOW (ref 13–17)
IMM GRANULOCYTES NFR BLD AUTO: 0.4 % — SIGNIFICANT CHANGE UP (ref 0–0.9)
LYMPHOCYTES # BLD AUTO: 2.33 K/UL — SIGNIFICANT CHANGE UP (ref 1–3.3)
LYMPHOCYTES # BLD AUTO: 20.6 % — SIGNIFICANT CHANGE UP (ref 13–44)
MAGNESIUM SERPL-MCNC: 1.9 MG/DL — SIGNIFICANT CHANGE UP (ref 1.6–2.6)
MCHC RBC-ENTMCNC: 25.6 PG — LOW (ref 27–34)
MCHC RBC-ENTMCNC: 30.8 GM/DL — LOW (ref 32–36)
MCV RBC AUTO: 83 FL — SIGNIFICANT CHANGE UP (ref 80–100)
MONOCYTES # BLD AUTO: 1.25 K/UL — HIGH (ref 0–0.9)
MONOCYTES NFR BLD AUTO: 11.1 % — SIGNIFICANT CHANGE UP (ref 2–14)
MRSA PCR RESULT.: SIGNIFICANT CHANGE UP
NEUTROPHILS # BLD AUTO: 7.42 K/UL — HIGH (ref 1.8–7.4)
NEUTROPHILS NFR BLD AUTO: 65.6 % — SIGNIFICANT CHANGE UP (ref 43–77)
NRBC # BLD: 0 /100 WBCS — SIGNIFICANT CHANGE UP (ref 0–0)
PHOSPHATE SERPL-MCNC: 3 MG/DL — SIGNIFICANT CHANGE UP (ref 2.5–4.5)
PLATELET # BLD AUTO: 392 K/UL — SIGNIFICANT CHANGE UP (ref 150–400)
POTASSIUM SERPL-MCNC: 4 MMOL/L — SIGNIFICANT CHANGE UP (ref 3.5–5.3)
POTASSIUM SERPL-SCNC: 4 MMOL/L — SIGNIFICANT CHANGE UP (ref 3.5–5.3)
RBC # BLD: 4.65 M/UL — SIGNIFICANT CHANGE UP (ref 4.2–5.8)
RBC # FLD: 13.2 % — SIGNIFICANT CHANGE UP (ref 10.3–14.5)
S AUREUS DNA NOSE QL NAA+PROBE: SIGNIFICANT CHANGE UP
SODIUM SERPL-SCNC: 135 MMOL/L — SIGNIFICANT CHANGE UP (ref 135–145)
WBC # BLD: 11.29 K/UL — HIGH (ref 3.8–10.5)
WBC # FLD AUTO: 11.29 K/UL — HIGH (ref 3.8–10.5)

## 2023-01-22 PROCEDURE — C1887: CPT

## 2023-01-22 PROCEDURE — C9600: CPT | Mod: LC

## 2023-01-22 PROCEDURE — C1769: CPT

## 2023-01-22 PROCEDURE — 83036 HEMOGLOBIN GLYCOSYLATED A1C: CPT

## 2023-01-22 PROCEDURE — 93931 UPPER EXTREMITY STUDY: CPT

## 2023-01-22 PROCEDURE — C1725: CPT

## 2023-01-22 PROCEDURE — 84100 ASSAY OF PHOSPHORUS: CPT

## 2023-01-22 PROCEDURE — 87640 STAPH A DNA AMP PROBE: CPT

## 2023-01-22 PROCEDURE — C1874: CPT

## 2023-01-22 PROCEDURE — 83735 ASSAY OF MAGNESIUM: CPT

## 2023-01-22 PROCEDURE — 97161 PT EVAL LOW COMPLEX 20 MIN: CPT

## 2023-01-22 PROCEDURE — 92921: CPT | Mod: LC

## 2023-01-22 PROCEDURE — 93005 ELECTROCARDIOGRAM TRACING: CPT

## 2023-01-22 PROCEDURE — 93458 L HRT ARTERY/VENTRICLE ANGIO: CPT | Mod: 59

## 2023-01-22 PROCEDURE — 85027 COMPLETE CBC AUTOMATED: CPT

## 2023-01-22 PROCEDURE — 99239 HOSP IP/OBS DSCHRG MGMT >30: CPT

## 2023-01-22 PROCEDURE — C1894: CPT

## 2023-01-22 PROCEDURE — 36415 COLL VENOUS BLD VENIPUNCTURE: CPT

## 2023-01-22 PROCEDURE — 87641 MR-STAPH DNA AMP PROBE: CPT

## 2023-01-22 PROCEDURE — 85025 COMPLETE CBC W/AUTO DIFF WBC: CPT

## 2023-01-22 PROCEDURE — 99232 SBSQ HOSP IP/OBS MODERATE 35: CPT | Mod: GC

## 2023-01-22 PROCEDURE — 82962 GLUCOSE BLOOD TEST: CPT

## 2023-01-22 PROCEDURE — 80048 BASIC METABOLIC PNL TOTAL CA: CPT

## 2023-01-22 RX ORDER — CHLORHEXIDINE GLUCONATE 213 G/1000ML
1 SOLUTION TOPICAL DAILY
Refills: 0 | Status: DISCONTINUED | OUTPATIENT
Start: 2023-01-22 | End: 2023-01-22

## 2023-01-22 RX ORDER — TAMSULOSIN HYDROCHLORIDE 0.4 MG/1
1 CAPSULE ORAL
Qty: 30 | Refills: 0
Start: 2023-01-22 | End: 2023-02-20

## 2023-01-22 RX ADMIN — CLOPIDOGREL BISULFATE 75 MILLIGRAM(S): 75 TABLET, FILM COATED ORAL at 07:32

## 2023-01-22 RX ADMIN — Medication 81 MILLIGRAM(S): at 07:33

## 2023-01-22 RX ADMIN — LOSARTAN POTASSIUM 100 MILLIGRAM(S): 100 TABLET, FILM COATED ORAL at 05:15

## 2023-01-22 RX ADMIN — Medication 1: at 07:34

## 2023-01-22 NOTE — PROGRESS NOTE ADULT - PROBLEM SELECTOR PLAN 8
diet: cc/dash diet   dvt ppx: encourage ambulation  dispo: d/c 1/22, patient plans to f/u with Dr. Max in the next 1-2 days to obtain TTE
diet: cc/dash diet   dvt ppx: encourage ambulation  dispo: hope to dc 1/22

## 2023-01-22 NOTE — DISCHARGE NOTE NURSING/CASE MANAGEMENT/SOCIAL WORK - PATIENT PORTAL LINK FT
You can access the FollowMyHealth Patient Portal offered by [facility] by registering at the following website: [email or web address]. By joining [facility]’s FollowMyHealth portal, you will also be able to view your health information using other applications (apps) compatible with our system.

## 2023-01-22 NOTE — PROGRESS NOTE ADULT - SUBJECTIVE AND OBJECTIVE BOX
PROGRESS NOTE:     Patient is a 72y old  Male who presents with a chief complaint of chest pain (21 Jan 2023 01:20)      SUBJECTIVE / OVERNIGHT EVENTS: Patient reports feeling well. Denies any symptoms. Walked around the unit multiple times without complaints.    ADDITIONAL REVIEW OF SYSTEMS: No F/C, N/V/D, chest pain, SOB     MEDICATIONS  (STANDING):  aspirin enteric coated 81 milliGRAM(s) Oral daily  chlorhexidine 2% Cloths 1 Application(s) Topical daily  clopidogrel Tablet 75 milliGRAM(s) Oral daily  dextrose 5%. 1000 milliLiter(s) (100 mL/Hr) IV Continuous <Continuous>  dextrose 5%. 1000 milliLiter(s) (50 mL/Hr) IV Continuous <Continuous>  dextrose 50% Injectable 25 Gram(s) IV Push once  dextrose 50% Injectable 12.5 Gram(s) IV Push once  dextrose 50% Injectable 25 Gram(s) IV Push once  glucagon  Injectable 1 milliGRAM(s) IntraMuscular once  influenza  Vaccine (HIGH DOSE) 0.7 milliLiter(s) IntraMuscular once  insulin lispro (ADMELOG) corrective regimen sliding scale   SubCutaneous three times a day before meals  insulin lispro (ADMELOG) corrective regimen sliding scale   SubCutaneous at bedtime  losartan 100 milliGRAM(s) Oral daily  pravastatin 80 milliGRAM(s) Oral at bedtime  sodium chloride 0.9%. 1000 milliLiter(s) (75 mL/Hr) IV Continuous <Continuous>  sodium chloride 0.9%. 1000 milliLiter(s) (100 mL/Hr) IV Continuous <Continuous>  sodium chloride 0.9%. 1000 milliLiter(s) (125 mL/Hr) IV Continuous <Continuous>  tamsulosin 0.4 milliGRAM(s) Oral at bedtime    MEDICATIONS  (PRN):  dextrose Oral Gel 15 Gram(s) Oral once PRN Blood Glucose LESS THAN 70 milliGRAM(s)/deciliter      CAPILLARY BLOOD GLUCOSE      POCT Blood Glucose.: 141 mg/dL (22 Jan 2023 11:20)  POCT Blood Glucose.: 168 mg/dL (22 Jan 2023 07:23)  POCT Blood Glucose.: 186 mg/dL (21 Jan 2023 21:16)  POCT Blood Glucose.: 216 mg/dL (21 Jan 2023 16:53)    I&O's Summary    21 Jan 2023 07:01  -  22 Jan 2023 07:00  --------------------------------------------------------  IN: 1720 mL / OUT: 1500 mL / NET: 220 mL        PHYSICAL EXAM:  Vital Signs Last 24 Hrs  T(C): 36.2 (22 Jan 2023 11:22), Max: 37.2 (21 Jan 2023 16:30)  T(F): 97.2 (22 Jan 2023 11:22), Max: 98.9 (21 Jan 2023 16:30)  HR: 107 (22 Jan 2023 11:22) (95 - 107)  BP: 117/79 (22 Jan 2023 11:22) (117/79 - 130/74)  BP(mean): --  RR: 18 (22 Jan 2023 11:22) (18 - 18)  SpO2: 98% (22 Jan 2023 11:22) (98% - 98%)    Parameters below as of 22 Jan 2023 11:22  Patient On (Oxygen Delivery Method): room air        CONSTITUTIONAL: NAD, well-developed  RESPIRATORY: Normal respiratory effort; lungs are clear to auscultation bilaterally  CARDIOVASCULAR: Regular rate and rhythm, normal S1 and S2, no murmur/rub/gallop; No lower extremity edema; Peripheral pulses are 2+ bilaterally  ABDOMEN: Nontender to palpation, normoactive bowel sounds, no rebound/guarding; No hepatosplenomegaly  MUSCLOSKELETAL: no clubbing or cyanosis of digits; no joint swelling or tenderness to palpation  PSYCH: A+O to person, place, and time; affect appropriate      LABS:                        11.9   11.29 )-----------( 392      ( 22 Jan 2023 06:33 )             38.6     01-22    135  |  101  |  14  ----------------------------<  134<H>  4.0   |  21<L>  |  1.21    Ca    9.6      22 Jan 2023 06:33  Phos  3.0     01-22  Mg     1.9     01-22

## 2023-01-22 NOTE — PROGRESS NOTE ADULT - PROBLEM SELECTOR PLAN 4
Consistent Carb diet  Humalog insulin sliding scale with finger sticks before meals and QHS  Confirm HgbA1c level
Likely from deconditioning, fatigue  Now resolved   -s/p IVF  -orthostatics negative  -No skilled PT needs as per PT
Likely from deconditioning, fatigue  -c/w IVF for now  -orthostatics negative  -PT consult

## 2023-01-22 NOTE — PROGRESS NOTE ADULT - PROBLEM SELECTOR PROBLEM 2
Pseudoaneurysm of vascular access site

## 2023-01-22 NOTE — PROGRESS NOTE ADULT - PROBLEM SELECTOR PLAN 5
takes metformin 1000 BID at home (holding)  -start ISS + CC diet  -nutrition consult
Holcomb placed by Urology   Continue with holcomb cath for now while PSA is actively addressed with serial manual compression   May attempt trial of void in the morning
takes metformin 1000 BID at home (holding)  -start ISS + CC diet  -nutrition consult

## 2023-01-22 NOTE — PROGRESS NOTE ADULT - PROBLEM SELECTOR PLAN 1
Outpatient CT coronaries showed CAD, now is s/p  LHC w/ ZHOU to prox-Cx (80%) and POBA to OM1 (70%) via RRA on 1/20/23  -c/w asa + plavix  -already on statin, acei, can be started on bb outpatient w/ dr. rai
s/p LHC - 1 ZHOU placed to proximal Cx (80%) and POBA to OM1 (70%) via RRA on 1/20/23  Continue close monitoring of radial access site and clinical status   Continue Aspirin and Plavix
Outpatient CT coronaries showed CAD, now is s/p  LHC w/ ZHOU to prox-Cx (80%) and POBA to OM1 (70%) via RRA on 1/20/23  -c/w asa + plavix  -already on statin, acei, can be started on bb outpatient w/ dr. rai

## 2023-01-22 NOTE — PROGRESS NOTE ADULT - PROBLEM SELECTOR PLAN 3
Course also complicated by urinary retention. No official diagnosis of BPH, but patient states increased urinary frequency at night  -appreciate urology helping w/ placement of 18Fr coude w/ 800cc of fluid drainage   -per urology - holcomb to be kept post discharge, and patient can follow up outpatient. patient is amenable to this
DASH diet   Continue antihypertensive medications with holding parameters
Course also complicated by urinary retention. No official diagnosis of BPH, but patient states increased urinary frequency at night  -appreciate urology helping w/ placement of 18Fr coude w/ 800cc of fluid drainage   -per urology - holcomb to be kept post discharge, and patient can follow up outpatient. patient is amenable to this

## 2023-01-22 NOTE — PROGRESS NOTE ADULT - SUBJECTIVE AND OBJECTIVE BOX
Vascular Surgery Progress Note    SUBJECTIVE  No acute events overnight.    OBJECTIVE  ___________________________________________________  VITAL SIGNS / I&O's   Vital Signs Last 24 Hrs  T(C): 36.3 (22 Jan 2023 05:13), Max: 37.2 (21 Jan 2023 16:30)  T(F): 97.4 (22 Jan 2023 05:13), Max: 98.9 (21 Jan 2023 16:30)  HR: 100 (22 Jan 2023 05:13) (56 - 109)  BP: 128/84 (22 Jan 2023 05:13) (68/57 - 130/74)  BP(mean): 79 (21 Jan 2023 14:15) (61 - 99)  RR: 18 (22 Jan 2023 05:13) (18 - 18)  SpO2: 98% (22 Jan 2023 05:13) (98% - 99%)    Parameters below as of 22 Jan 2023 05:13  Patient On (Oxygen Delivery Method): room air          21 Jan 2023 07:01  -  22 Jan 2023 07:00  --------------------------------------------------------  IN:    Oral Fluid: 720 mL    sodium chloride 0.9%: 500 mL    Sodium Chloride 0.9% Bolus: 500 mL  Total IN: 1720 mL    OUT:    Indwelling Catheter - Urethral (mL): 1300 mL    Voided (mL): 200 mL  Total OUT: 1500 mL    Total NET: 220 mL        ___________________________________________________  PHYSICAL EXAM    General: NAD    ___________________________________________________  LABS                        11.9   11.29 )-----------( 392      ( 22 Jan 2023 06:33 )             38.6     22 Jan 2023 06:33    135    |  101    |  14     ----------------------------<  134    4.0     |  21     |  1.21     Ca    9.6        22 Jan 2023 06:33  Phos  3.0       22 Jan 2023 06:33  Mg     1.9       22 Jan 2023 06:33        CAPILLARY BLOOD GLUCOSE      POCT Blood Glucose.: 168 mg/dL (22 Jan 2023 07:23)  POCT Blood Glucose.: 186 mg/dL (21 Jan 2023 21:16)  POCT Blood Glucose.: 216 mg/dL (21 Jan 2023 16:53)  POCT Blood Glucose.: 165 mg/dL (21 Jan 2023 13:33)  POCT Blood Glucose.: 141 mg/dL (21 Jan 2023 11:27)          ___________________________________________________  MICRO  Recent Cultures:    ___________________________________________________  MEDICATIONS  (STANDING):  aspirin enteric coated 81 milliGRAM(s) Oral daily  clopidogrel Tablet 75 milliGRAM(s) Oral daily  dextrose 5%. 1000 milliLiter(s) (50 mL/Hr) IV Continuous <Continuous>  dextrose 5%. 1000 milliLiter(s) (100 mL/Hr) IV Continuous <Continuous>  dextrose 50% Injectable 25 Gram(s) IV Push once  dextrose 50% Injectable 12.5 Gram(s) IV Push once  dextrose 50% Injectable 25 Gram(s) IV Push once  glucagon  Injectable 1 milliGRAM(s) IntraMuscular once  influenza  Vaccine (HIGH DOSE) 0.7 milliLiter(s) IntraMuscular once  insulin lispro (ADMELOG) corrective regimen sliding scale   SubCutaneous three times a day before meals  insulin lispro (ADMELOG) corrective regimen sliding scale   SubCutaneous at bedtime  losartan 100 milliGRAM(s) Oral daily  pravastatin 80 milliGRAM(s) Oral at bedtime  sodium chloride 0.9%. 1000 milliLiter(s) (100 mL/Hr) IV Continuous <Continuous>  sodium chloride 0.9%. 1000 milliLiter(s) (125 mL/Hr) IV Continuous <Continuous>  sodium chloride 0.9%. 1000 milliLiter(s) (75 mL/Hr) IV Continuous <Continuous>  tamsulosin 0.4 milliGRAM(s) Oral at bedtime    MEDICATIONS  (PRN):  dextrose Oral Gel 15 Gram(s) Oral once PRN Blood Glucose LESS THAN 70 milliGRAM(s)/deciliter

## 2023-01-22 NOTE — PROGRESS NOTE ADULT - ASSESSMENT
72M with PMH DM (T2), HTN, HLD. Pt reports exertional CP which resolves with rest. Pt s/p 1/21 LHC via RRA. Pulsatile hematoma was noted at puncture site after TR band was removed. TR band was reapplied and vascular surgery was consulted.     PLAN  - Repeat arterial duplex with pseudoaneurysm tract, without pseudoaneurysm  - Clear for discharge from vascular surgery perspective  - F/u in one week with Dr. Nava for repeat arterial duplex    Vascular Surgery  [id number]
72yom w/hx of T2DM, HTN, HLD, who presented initially to [facility] for elective cardiac cath after outpatient CT coronaries showed CAD, now He is s/p LHC w/ ZHOU to prox-Cx (80%) and POBA to OM1 (70%) via RRA on 1/20/23 with Dr. Duran, course complicated by pseudoaneursym, urinary retention and vasovagal episode.
72 year old male with PMH DM (T2), HTN, HLD. Pt reports exertional CP which resolves with rest. Patient s/p LHC - 1 drug eluting stent placed to proximal Cx (80%) and POBA to OM1 (70%) via RRA on 1/20/23 with Dr. Duran. Patient now has pseudoaneurysm on RRA. 
72yom w/hx of T2DM, HTN, HLD, who presented initially to [facility] for elective cardiac cath after outpatient CT coronaries showed CAD, now He is s/p LHC w/ ZHOU to prox-Cx (80%) and POBA to OM1 (70%) via RRA on 1/20/23 with Dr. Duran, course complicated by pseudoaneursym, urinary retention and vasovagal episode.

## 2023-01-23 LAB — SARS-COV-2 N GENE NPH QL NAA+PROBE: NOT DETECTED

## 2023-01-24 ENCOUNTER — APPOINTMENT (OUTPATIENT)
Dept: CARDIOLOGY | Facility: CLINIC | Age: 73
End: 2023-01-24
Payer: MEDICARE

## 2023-01-24 ENCOUNTER — APPOINTMENT (OUTPATIENT)
Dept: UROLOGY | Facility: CLINIC | Age: 73
End: 2023-01-24
Payer: MEDICARE

## 2023-01-24 ENCOUNTER — OUTPATIENT (OUTPATIENT)
Dept: OUTPATIENT SERVICES | Facility: HOSPITAL | Age: 73
LOS: 1 days | End: 2023-01-24
Payer: COMMERCIAL

## 2023-01-24 ENCOUNTER — NON-APPOINTMENT (OUTPATIENT)
Age: 73
End: 2023-01-24

## 2023-01-24 VITALS
RESPIRATION RATE: 17 BRPM | BODY MASS INDEX: 24.16 KG/M2 | TEMPERATURE: 97.6 F | SYSTOLIC BLOOD PRESSURE: 153 MMHG | HEIGHT: 65 IN | DIASTOLIC BLOOD PRESSURE: 75 MMHG | WEIGHT: 145 LBS | HEART RATE: 103 BPM

## 2023-01-24 VITALS
WEIGHT: 145 LBS | TEMPERATURE: 98.4 F | HEIGHT: 65 IN | SYSTOLIC BLOOD PRESSURE: 138 MMHG | DIASTOLIC BLOOD PRESSURE: 68 MMHG | BODY MASS INDEX: 24.16 KG/M2 | OXYGEN SATURATION: 98 % | HEART RATE: 125 BPM

## 2023-01-24 DIAGNOSIS — N99.89 OTHER POSTPROCEDURAL COMPLICATIONS AND DISORDERS OF GENITOURINARY SYSTEM: ICD-10-CM

## 2023-01-24 DIAGNOSIS — M48.8X2 OTHER SPECIFIED SPONDYLOPATHIES, CERVICAL REGION: Chronic | ICD-10-CM

## 2023-01-24 DIAGNOSIS — R33.8 OTHER POSTPROCEDURAL COMPLICATIONS AND DISORDERS OF GENITOURINARY SYSTEM: ICD-10-CM

## 2023-01-24 PROCEDURE — 93308 TTE F-UP OR LMTD: CPT

## 2023-01-24 PROCEDURE — 99214 OFFICE O/P EST MOD 30 MIN: CPT | Mod: 25

## 2023-01-24 PROCEDURE — 51700 IRRIGATION OF BLADDER: CPT

## 2023-01-24 PROCEDURE — 93000 ELECTROCARDIOGRAM COMPLETE: CPT

## 2023-01-24 PROCEDURE — 51798 US URINE CAPACITY MEASURE: CPT

## 2023-01-24 PROCEDURE — 93306 TTE W/DOPPLER COMPLETE: CPT

## 2023-01-24 RX ORDER — CLOPIDOGREL 75 MG/1
75 TABLET, FILM COATED ORAL
Refills: 0 | Status: ACTIVE | COMMUNITY
Start: 2023-01-24

## 2023-01-24 NOTE — HISTORY OF PRESENT ILLNESS
[FreeTextEntry1] : This is a 72 year male with a Pmhx of HTN HLD DM CAD who presents to the office for HFU pt admitted ot [facility] on 1/2023 with chest pain s/p cardiac cath with 1 ZHOU to prox Cx POBA to OM1. pt with possible pseudoaneurysm post cath and urinary retention with holcomb placement \par \par pt reports overall feeling wel Denies any chest pain or SOB\par Denies any fever chills. reports urine is clear in holcomb bag. denies any hematuria or cloudy urine. \par Denies any heart palpitations \par \par Pt has scheduled arterial doppler on 1/30s to see vascular pt denies any weakness or N//T or pain to RUE

## 2023-01-25 NOTE — PHYSICAL EXAM
[General Appearance - Well Developed] : well developed [General Appearance - Well Nourished] : well nourished [Edema] : no peripheral edema [Exaggerated Use Of Accessory Muscles For Inspiration] : no accessory muscle use [Abdomen Soft] : soft [Abdomen Mass (___ Cm)] : no abdominal mass palpated [Abdomen Hernia] : no hernia was discovered [Urethral Meatus] : meatus normal [Penis Abnormality] : normal circumcised penis [Scrotum] : the scrotum was normal [Normal Station and Gait] : the gait and station were normal for the patient's age [] : no rash [No Focal Deficits] : no focal deficits [Oriented To Time, Place, And Person] : oriented to person, place, and time

## 2023-01-25 NOTE — HISTORY OF PRESENT ILLNESS
[FreeTextEntry1] : CARIE HAYS is a 72 year old who presents today as a new patient as a HFU for urinary retention.\par  He went to [facility] 1/20/2023 for chest pain s/p cardiac cath, post cath he had urinary retention, urology placed a 18fFr coude. He was discharged home with Tamsulosin and was told to follow up with urology. \par \par At baseline  the patient reports nocturia x3 and a weak stream. Patient denies ever having urinary frequency, urgency, incontinence/dribbling, dysuria, hematuria, or incomplete emptying. He reports good urine output with the holcomb, yellow and clear. No prior retention.  \par \par Denies family hx of urological disorders.\par Social hx: Nonsmoker \par \par \par

## 2023-01-26 ENCOUNTER — APPOINTMENT (OUTPATIENT)
Dept: VASCULAR SURGERY | Facility: CLINIC | Age: 73
End: 2023-01-26
Payer: MEDICARE

## 2023-01-26 VITALS
DIASTOLIC BLOOD PRESSURE: 91 MMHG | TEMPERATURE: 98.3 F | BODY MASS INDEX: 24.24 KG/M2 | HEART RATE: 90 BPM | HEIGHT: 64 IN | WEIGHT: 142 LBS | SYSTOLIC BLOOD PRESSURE: 173 MMHG

## 2023-01-26 DIAGNOSIS — R33.9 RETENTION OF URINE, UNSPECIFIED: ICD-10-CM

## 2023-01-26 PROCEDURE — 99213 OFFICE O/P EST LOW 20 MIN: CPT

## 2023-01-26 PROCEDURE — 93931 UPPER EXTREMITY STUDY: CPT

## 2023-01-30 ENCOUNTER — APPOINTMENT (OUTPATIENT)
Dept: VASCULAR SURGERY | Facility: CLINIC | Age: 73
End: 2023-01-30

## 2023-02-01 ENCOUNTER — APPOINTMENT (OUTPATIENT)
Dept: VASCULAR SURGERY | Facility: CLINIC | Age: 73
End: 2023-02-01

## 2023-02-01 NOTE — HISTORY OF PRESENT ILLNESS
[FreeTextEntry1] : Patient with recent hospitalization for cardiac cath via right radial access. Post procedure, he was found to have a right radial artery pseudoaneurysm for which manual compression was held.  [de-identified] : 2/1/2023 - He presents today after repeat RUE arterial duplex.

## 2023-02-01 NOTE — REASON FOR VISIT
[Home] : at home, [unfilled] , at the time of the visit. [Medical Office: ([facility])___] : at the medical office located in  [Verbal consent obtained from patient] : the patient, [unfilled] [Follow-Up: _____] : a [unfilled] follow-up visit

## 2023-02-02 LAB
ANION GAP SERPL CALC-SCNC: 14 MMOL/L
BASOPHILS # BLD AUTO: 0.04 K/UL
BASOPHILS NFR BLD AUTO: 0.4 %
BUN SERPL-MCNC: 29 MG/DL
CALCIUM SERPL-MCNC: 9.9 MG/DL
CHLORIDE SERPL-SCNC: 100 MMOL/L
CO2 SERPL-SCNC: 21 MMOL/L
CREAT SERPL-MCNC: 1.52 MG/DL
EGFR: 48 ML/MIN/1.73M2
EOSINOPHIL # BLD AUTO: 0.39 K/UL
EOSINOPHIL NFR BLD AUTO: 3.5 %
FERRITIN SERPL-MCNC: 33 NG/ML
FOLATE SERPL-MCNC: >20 NG/ML
GLUCOSE SERPL-MCNC: 165 MG/DL
HCT VFR BLD CALC: 34.5 %
HGB BLD-MCNC: 11 G/DL
IMM GRANULOCYTES NFR BLD AUTO: 0.4 %
IRON SATN MFR SERPL: 13 %
IRON SERPL-MCNC: 57 UG/DL
LYMPHOCYTES # BLD AUTO: 2.34 K/UL
LYMPHOCYTES NFR BLD AUTO: 21.2 %
MAGNESIUM SERPL-MCNC: 2.1 MG/DL
MAN DIFF?: NORMAL
MCHC RBC-ENTMCNC: 26.7 PG
MCHC RBC-ENTMCNC: 31.9 GM/DL
MCV RBC AUTO: 83.7 FL
MONOCYTES # BLD AUTO: 0.98 K/UL
MONOCYTES NFR BLD AUTO: 8.9 %
NEUTROPHILS # BLD AUTO: 7.26 K/UL
NEUTROPHILS NFR BLD AUTO: 65.6 %
PLATELET # BLD AUTO: 389 K/UL
POTASSIUM SERPL-SCNC: 5 MMOL/L
RBC # BLD: 4.12 M/UL
RBC # FLD: 13.8 %
SODIUM SERPL-SCNC: 136 MMOL/L
T4 FREE SERPL-MCNC: 1.4 NG/DL
TIBC SERPL-MCNC: 436 UG/DL
TRANSFERRIN SERPL-MCNC: 315 MG/DL
TSH SERPL-ACNC: 3.15 UIU/ML
UIBC SERPL-MCNC: 379 UG/DL
VIT B12 SERPL-MCNC: 346 PG/ML
WBC # FLD AUTO: 11.05 K/UL

## 2023-02-09 ENCOUNTER — APPOINTMENT (OUTPATIENT)
Dept: CARDIOLOGY | Facility: CLINIC | Age: 73
End: 2023-02-09
Payer: MEDICARE

## 2023-02-09 ENCOUNTER — NON-APPOINTMENT (OUTPATIENT)
Age: 73
End: 2023-02-09

## 2023-02-09 VITALS
TEMPERATURE: 98.4 F | HEIGHT: 64 IN | BODY MASS INDEX: 24.59 KG/M2 | DIASTOLIC BLOOD PRESSURE: 70 MMHG | HEART RATE: 90 BPM | WEIGHT: 144 LBS | OXYGEN SATURATION: 99 % | SYSTOLIC BLOOD PRESSURE: 140 MMHG

## 2023-02-09 DIAGNOSIS — I72.9 ANEURYSM OF UNSPECIFIED SITE: ICD-10-CM

## 2023-02-09 PROCEDURE — 99214 OFFICE O/P EST MOD 30 MIN: CPT | Mod: 25

## 2023-02-09 PROCEDURE — 93000 ELECTROCARDIOGRAM COMPLETE: CPT

## 2023-02-09 NOTE — HISTORY OF PRESENT ILLNESS
[FreeTextEntry1] : This is on a 71 y/o with a pmhx of HTN HLD DM CAD. Pt is s/p cardiac cath with 1 ZHOU to prox Cx POBA to OM1. pt with possible pseudoaneurysm post cath and urinary retention with holcomb placement. Pt has appointment with urology Dr. Gomez on 1/24/23 and had holcomb removed and was advised to continue tamsulosin.  Pt had RUE arterial duplex 1/26/23 which showed partially thrombosed RRA with hematoma. Patient feels well, deneis symptoms. Patient denies chest pain, dyspnea, palpitations, dizziness, syncope, changes in bowel/bladder habits or appetite. \par

## 2023-02-10 LAB
ALBUMIN SERPL ELPH-MCNC: 4.4 G/DL
ALP BLD-CCNC: 67 U/L
ALT SERPL-CCNC: 12 U/L
ANION GAP SERPL CALC-SCNC: 14 MMOL/L
AST SERPL-CCNC: 14 U/L
BASOPHILS # BLD AUTO: 0.06 K/UL
BASOPHILS NFR BLD AUTO: 0.7 %
BILIRUB DIRECT SERPL-MCNC: 0.1 MG/DL
BILIRUB INDIRECT SERPL-MCNC: 0.3 MG/DL
BILIRUB SERPL-MCNC: 0.4 MG/DL
BUN SERPL-MCNC: 25 MG/DL
CALCIUM SERPL-MCNC: 10 MG/DL
CHLORIDE SERPL-SCNC: 101 MMOL/L
CHOLEST SERPL-MCNC: 132 MG/DL
CK SERPL-CCNC: 69 U/L
CO2 SERPL-SCNC: 23 MMOL/L
CREAT SERPL-MCNC: 1.41 MG/DL
EGFR: 52 ML/MIN/1.73M2
EOSINOPHIL # BLD AUTO: 0.47 K/UL
EOSINOPHIL NFR BLD AUTO: 5.2 %
FERRITIN SERPL-MCNC: 24 NG/ML
FOLATE SERPL-MCNC: >20 NG/ML
GLUCOSE SERPL-MCNC: 118 MG/DL
HCT VFR BLD CALC: 35.3 %
HDLC SERPL-MCNC: 48 MG/DL
HGB BLD-MCNC: 10.9 G/DL
IMM GRANULOCYTES NFR BLD AUTO: 0.2 %
IRON SATN MFR SERPL: 12 %
IRON SERPL-MCNC: 44 UG/DL
LDLC SERPL CALC-MCNC: 69 MG/DL
LDLC SERPL DIRECT ASSAY-MCNC: 71 MG/DL
LYMPHOCYTES # BLD AUTO: 2.16 K/UL
LYMPHOCYTES NFR BLD AUTO: 23.8 %
MAN DIFF?: NORMAL
MCHC RBC-ENTMCNC: 26.4 PG
MCHC RBC-ENTMCNC: 30.9 GM/DL
MCV RBC AUTO: 85.5 FL
MONOCYTES # BLD AUTO: 0.74 K/UL
MONOCYTES NFR BLD AUTO: 8.1 %
NEUTROPHILS # BLD AUTO: 5.63 K/UL
NEUTROPHILS NFR BLD AUTO: 62 %
NONHDLC SERPL-MCNC: 84 MG/DL
PLATELET # BLD AUTO: 572 K/UL
POTASSIUM SERPL-SCNC: 5.2 MMOL/L
PROT SERPL-MCNC: 7.1 G/DL
RBC # BLD: 4.13 M/UL
RBC # FLD: 14.2 %
SODIUM SERPL-SCNC: 138 MMOL/L
TIBC SERPL-MCNC: 369 UG/DL
TRANSFERRIN SERPL-MCNC: 307 MG/DL
TRIGL SERPL-MCNC: 78 MG/DL
UIBC SERPL-MCNC: 325 UG/DL
VIT B12 SERPL-MCNC: 322 PG/ML
WBC # FLD AUTO: 9.08 K/UL

## 2023-02-17 ENCOUNTER — OUTPATIENT (OUTPATIENT)
Dept: OUTPATIENT SERVICES | Facility: HOSPITAL | Age: 73
LOS: 1 days | Discharge: HOME | End: 2023-02-17

## 2023-02-17 ENCOUNTER — APPOINTMENT (OUTPATIENT)
Dept: GASTROENTEROLOGY | Facility: CLINIC | Age: 73
End: 2023-02-17
Payer: MEDICARE

## 2023-02-17 VITALS
BODY MASS INDEX: 24.59 KG/M2 | HEIGHT: 64 IN | TEMPERATURE: 98.2 F | RESPIRATION RATE: 14 BRPM | OXYGEN SATURATION: 99 % | SYSTOLIC BLOOD PRESSURE: 144 MMHG | DIASTOLIC BLOOD PRESSURE: 80 MMHG | HEART RATE: 87 BPM | WEIGHT: 144 LBS

## 2023-02-17 DIAGNOSIS — M48.8X2 OTHER SPECIFIED SPONDYLOPATHIES, CERVICAL REGION: Chronic | ICD-10-CM

## 2023-02-17 DIAGNOSIS — D50.9 IRON DEFICIENCY ANEMIA, UNSPECIFIED: ICD-10-CM

## 2023-02-17 DIAGNOSIS — D47.3 ESSENTIAL (HEMORRHAGIC) THROMBOCYTHEMIA: ICD-10-CM

## 2023-02-17 DIAGNOSIS — R79.89 OTHER SPECIFIED ABNORMAL FINDINGS OF BLOOD CHEMISTRY: ICD-10-CM

## 2023-02-17 PROCEDURE — 99203 OFFICE O/P NEW LOW 30 MIN: CPT

## 2023-02-17 RX ORDER — FAMOTIDINE 40 MG/1
40 TABLET, FILM COATED ORAL
Qty: 30 | Refills: 1 | Status: ACTIVE | COMMUNITY
Start: 2023-02-17 | End: 1900-01-01

## 2023-02-19 NOTE — ASSESSMENT
[FreeTextEntry1] : 73 yo male, hx of CAD with ZHOU 1/23, with Plavix and ASA started. Hb approx 11 c/w 13.7 in 2021 and 13 in August 2022. Hb 11.9 12/22.  States had colonoscopy approx 10 years ago. Using iron sulfate daily, started this week. Denies GERD, epigastric pain, dark stools or rectal bleeding.\par \par IMP:\par 1. iron deficiency anemia, exclude GI loss ( Gastritis, colon polyp/neoplasm)\par 2.  CAD with recent ZHOU 1/23\par \par PLAN:\par 1. maintain plavix/asa\par 2. Followup cbc\par 3. RTO 3months with reevaluation; will require cardiac clearance, prior to EGD/COLON while on antiplt agent

## 2023-02-19 NOTE — HISTORY OF PRESENT ILLNESS
[FreeTextEntry1] : 73 yo male, hx of CAD with ZHOU 1/23, with Plavix and ASA started. Hb approx 11 c/w 13.7 in 2021 and 13 in August 2022. Hb 11.9 12/22.  States had colonoscopy approx 10 years ago. Using iron sulfate daily, started this week. Denies GERD, epigastric pain, dark stools or rectal bleeding. [de-identified] : 2019 (Daniel Marquez) gastritis, BE

## 2023-02-19 NOTE — PHYSICAL EXAM
[Alert] : alert [Normal Voice/Communication] : normal voice/communication [No Acute Distress] : no acute distress [Healthy Appearing] : healthy appearing [Sclera] : the sclera and conjunctiva were normal [Hearing Threshold Finger Rub Not Piatt] : hearing was normal [Normal Lips/Gums] : the lips and gums were normal [Oropharynx] : the oropharynx was normal [Normal Appearance] : the appearance of the neck was normal [No Neck Mass] : no neck mass was observed [No Respiratory Distress] : no respiratory distress [No Acc Muscle Use] : no accessory muscle use [Respiration, Rhythm And Depth] : normal respiratory rhythm and effort [Auscultation Breath Sounds / Voice Sounds] : lungs were clear to auscultation bilaterally [Heart Rate And Rhythm] : heart rate was normal and rhythm regular [Normal S1, S2] : normal S1 and S2 [Murmurs] : no murmurs [Abdomen Tenderness] : non-tender [Bowel Sounds] : normal bowel sounds [No Masses] : no abdominal mass palpated [Abdomen Soft] : soft [] : no hepatosplenomegaly [Oriented To Time, Place, And Person] : oriented to person, place, and time

## 2023-02-23 ENCOUNTER — APPOINTMENT (OUTPATIENT)
Dept: HEMATOLOGY ONCOLOGY | Facility: CLINIC | Age: 73
End: 2023-02-23
Payer: MEDICARE

## 2023-02-23 ENCOUNTER — RESULT REVIEW (OUTPATIENT)
Age: 73
End: 2023-02-23

## 2023-02-23 ENCOUNTER — NON-APPOINTMENT (OUTPATIENT)
Age: 73
End: 2023-02-23

## 2023-02-23 VITALS
RESPIRATION RATE: 16 BRPM | BODY MASS INDEX: 23.97 KG/M2 | OXYGEN SATURATION: 100 % | TEMPERATURE: 97.3 F | HEIGHT: 64 IN | SYSTOLIC BLOOD PRESSURE: 155 MMHG | WEIGHT: 140.41 LBS | HEART RATE: 99 BPM | DIASTOLIC BLOOD PRESSURE: 81 MMHG

## 2023-02-23 LAB
BASOPHILS # BLD AUTO: 0.04 K/UL — SIGNIFICANT CHANGE UP (ref 0–0.2)
BASOPHILS NFR BLD AUTO: 0.5 % — SIGNIFICANT CHANGE UP (ref 0–2)
EOSINOPHIL # BLD AUTO: 0.57 K/UL — HIGH (ref 0–0.5)
EOSINOPHIL NFR BLD AUTO: 7 % — HIGH (ref 0–6)
HCT VFR BLD CALC: 36.4 % — LOW (ref 39–50)
HGB BLD-MCNC: 11.5 G/DL — LOW (ref 13–17)
IMM GRANULOCYTES NFR BLD AUTO: 0.4 % — SIGNIFICANT CHANGE UP (ref 0–0.9)
LYMPHOCYTES # BLD AUTO: 2.22 K/UL — SIGNIFICANT CHANGE UP (ref 1–3.3)
LYMPHOCYTES # BLD AUTO: 27.3 % — SIGNIFICANT CHANGE UP (ref 13–44)
MCHC RBC-ENTMCNC: 26.4 PG — LOW (ref 27–34)
MCHC RBC-ENTMCNC: 31.6 G/DL — LOW (ref 32–36)
MCV RBC AUTO: 83.7 FL — SIGNIFICANT CHANGE UP (ref 80–100)
MONOCYTES # BLD AUTO: 0.68 K/UL — SIGNIFICANT CHANGE UP (ref 0–0.9)
MONOCYTES NFR BLD AUTO: 8.4 % — SIGNIFICANT CHANGE UP (ref 2–14)
NEUTROPHILS # BLD AUTO: 4.59 K/UL — SIGNIFICANT CHANGE UP (ref 1.8–7.4)
NEUTROPHILS NFR BLD AUTO: 56.4 % — SIGNIFICANT CHANGE UP (ref 43–77)
NRBC # BLD: 0 /100 WBCS — SIGNIFICANT CHANGE UP (ref 0–0)
PLATELET # BLD AUTO: 390 K/UL — SIGNIFICANT CHANGE UP (ref 150–400)
RBC # BLD: 4.35 M/UL — SIGNIFICANT CHANGE UP (ref 4.2–5.8)
RBC # FLD: 15.3 % — HIGH (ref 10.3–14.5)
RETICS #: 100 K/UL — SIGNIFICANT CHANGE UP (ref 25–125)
RETICS/RBC NFR: 2.3 % — SIGNIFICANT CHANGE UP (ref 0.5–2.5)
WBC # BLD: 8.13 K/UL — SIGNIFICANT CHANGE UP (ref 3.8–10.5)
WBC # FLD AUTO: 8.13 K/UL — SIGNIFICANT CHANGE UP (ref 3.8–10.5)

## 2023-02-23 PROCEDURE — 99214 OFFICE O/P EST MOD 30 MIN: CPT

## 2023-02-24 ENCOUNTER — APPOINTMENT (OUTPATIENT)
Dept: UROLOGY | Facility: CLINIC | Age: 73
End: 2023-02-24
Payer: MEDICARE

## 2023-02-24 DIAGNOSIS — Z87.898 PERSONAL HISTORY OF OTHER SPECIFIED CONDITIONS: ICD-10-CM

## 2023-02-24 PROCEDURE — 99213 OFFICE O/P EST LOW 20 MIN: CPT

## 2023-02-24 NOTE — HISTORY OF PRESENT ILLNESS
[FreeTextEntry1] : CARIE HAYS is a 72 year old who presents today as a new patient as a HFU for urinary retention.\par  He went to [facility] 1/20/2023 for chest pain s/p cardiac cath, post cath he had urinary retention, urology placed a 18fFr coude. He was discharged home with Tamsulosin and was told to follow up with urology. \par \par At baseline  the patient reports nocturia x3 and a weak stream. Patient denies ever having urinary frequency, urgency, incontinence/dribbling, dysuria, hematuria, or incomplete emptying. He reports good urine output with the holcomb, yellow and clear. No prior retention.  \par \par Denies family hx of urological disorders.\par Social hx: Nonsmoker \par \par \par 2/23 passed TOV.\par stayed on tamsulosin. Feels voiding easier, stronger FOS and feels empty. Notes less nocturia. \par No UTI symptoms. \par \par

## 2023-02-26 RX ORDER — IRON POLYSACCHARIDE COMPLEX 150 MG
150 CAPSULE ORAL
Qty: 180 | Refills: 0 | Status: DISCONTINUED | COMMUNITY
Start: 2019-09-24 | End: 2023-02-26

## 2023-02-26 NOTE — HISTORY OF PRESENT ILLNESS
[de-identified] : Patient hg dropped down to 10.9g/dl gradually over a 1 year period in 2022.  Initially normal in 2021 when I met him then 10.9g/dl today. patient on Ferrex for 2 weeks, though pharmacy gave him a ferrous sulfate.   he has been able to continue this BID to make up fro the difference.    Does have some stomach "stiffness" not constipation.

## 2023-02-26 NOTE — ASSESSMENT
[FreeTextEntry1] : This is a 70 year old man with a history of diabetes, and hyperlipidemia, patient presents for the evaluation of a leukocytosis, no localizing symptoms of infection, patient did have a cervical fusion in 3/3/14. No complications.  \par ALC 3,330 Diff normal throughout the past 2 years.  Has been elevated to this extent since 2019.  Most recent WBC 12.4k/ul in association with a normal diff, platelets 466, has been in the 315-480's range for 2 years.  \par Will run flow cytometry rule out clonal lymphocyte disorder.   LU 2 and BCR/ABL negative, no evidence of essential thrombocytosis or other MPN.  \par \par Patient now returns for follow up of the anemia. Hg 11.5g/dl, appearss to be a combination of anemia of chronic disease along with iron deficiency. Hg phoebe from 10.9 to 11.5g/dl today. Will have patient continue with the iron supplementation as evidenced by the lower ferritin 24ng/dl.  Follow up in 6 months.

## 2023-03-01 LAB
ALBUMIN SERPL ELPH-MCNC: 4.7 G/DL
ALP BLD-CCNC: 69 U/L
ALT SERPL-CCNC: 10 U/L
ANION GAP SERPL CALC-SCNC: 13 MMOL/L
AST SERPL-CCNC: 15 U/L
BILIRUB SERPL-MCNC: 0.8 MG/DL
BUN SERPL-MCNC: 22 MG/DL
CALCIUM SERPL-MCNC: 9.6 MG/DL
CHLORIDE SERPL-SCNC: 102 MMOL/L
CO2 SERPL-SCNC: 23 MMOL/L
CREAT SERPL-MCNC: 1.5 MG/DL
EGFR: 49 ML/MIN/1.73M2
FERRITIN SERPL-MCNC: 38 NG/ML
FOLATE SERPL-MCNC: 19.8 NG/ML
GLUCOSE SERPL-MCNC: 112 MG/DL
IRON SATN MFR SERPL: 29 %
IRON SERPL-MCNC: 119 UG/DL
POTASSIUM SERPL-SCNC: 4.9 MMOL/L
PROT SERPL-MCNC: 7.5 G/DL
SODIUM SERPL-SCNC: 137 MMOL/L
TIBC SERPL-MCNC: 403 UG/DL
UIBC SERPL-MCNC: 284 UG/DL
VIT B12 SERPL-MCNC: 313 PG/ML

## 2023-03-14 ENCOUNTER — NON-APPOINTMENT (OUTPATIENT)
Age: 73
End: 2023-03-14

## 2023-03-17 ENCOUNTER — APPOINTMENT (OUTPATIENT)
Dept: INTERNAL MEDICINE | Facility: CLINIC | Age: 73
End: 2023-03-17
Payer: MEDICARE

## 2023-03-17 VITALS
DIASTOLIC BLOOD PRESSURE: 80 MMHG | HEART RATE: 99 BPM | SYSTOLIC BLOOD PRESSURE: 140 MMHG | HEIGHT: 64 IN | TEMPERATURE: 97.6 F | OXYGEN SATURATION: 100 %

## 2023-03-17 PROCEDURE — 99204 OFFICE O/P NEW MOD 45 MIN: CPT

## 2023-03-17 PROCEDURE — 99214 OFFICE O/P EST MOD 30 MIN: CPT

## 2023-03-17 NOTE — REVIEW OF SYSTEMS
[Back Pain] : back pain [Chest Pain] : no chest pain [Palpitations] : no palpitations [Shortness Of Breath] : no shortness of breath [Wheezing] : no wheezing [Abdominal Pain] : no abdominal pain [Nausea] : no nausea [Dysuria] : no dysuria [Incontinence] : no incontinence [Itching] : no itching [Skin Rash] : no skin rash [Headache] : no headache

## 2023-03-17 NOTE — HISTORY OF PRESENT ILLNESS
[FreeTextEntry8] : 72 year old male with PMHx of CAD, HTN, HLD, DM-2 who presents with complaints of chronic low back pain that radiates to both legs. No recent falls or trauma.  He does not use any pain relievers.  He grades the pain as 5/10 at maximum intensity.  The pain is better is he is moving around and is worse if he sits still for a prolonged time.

## 2023-03-17 NOTE — PLAN
[FreeTextEntry1] : Low back pain - I offered pain medications and possibly muscle relaxants but patient declines stating his pain is not that bad.  I advised tylenol prn for pain control for mild pain.  Patient will follow-up with his neurologist, Dr. Nissenbaum, as he had MRI of lower back done recently.\par CAD - c/w ASA 81 and plavix 75 mg\par HTN/HLD/DM - c/w current meds, has followup appt pending w/ Dr. Gibbons in 1 month

## 2023-03-17 NOTE — PHYSICAL EXAM
[No Acute Distress] : no acute distress [Normal Sclera/Conjunctiva] : normal sclera/conjunctiva [Normal Outer Ear/Nose] : the outer ears and nose were normal in appearance [No JVD] : no jugular venous distention [No Respiratory Distress] : no respiratory distress  [No Accessory Muscle Use] : no accessory muscle use [Clear to Auscultation] : lungs were clear to auscultation bilaterally [Normal Rate] : normal rate  [Regular Rhythm] : with a regular rhythm [Normal S1, S2] : normal S1 and S2 [No Edema] : there was no peripheral edema [Soft] : abdomen soft [No CVA Tenderness] : no CVA  tenderness [No Joint Swelling] : no joint swelling [No Rash] : no rash

## 2023-04-04 ENCOUNTER — RX RENEWAL (OUTPATIENT)
Age: 73
End: 2023-04-04

## 2023-04-26 ENCOUNTER — APPOINTMENT (OUTPATIENT)
Dept: CARDIOLOGY | Facility: CLINIC | Age: 73
End: 2023-04-26
Payer: MEDICARE

## 2023-04-26 ENCOUNTER — NON-APPOINTMENT (OUTPATIENT)
Age: 73
End: 2023-04-26

## 2023-04-26 ENCOUNTER — APPOINTMENT (OUTPATIENT)
Dept: ENDOCRINOLOGY | Facility: CLINIC | Age: 73
End: 2023-04-26
Payer: MEDICARE

## 2023-04-26 VITALS — SYSTOLIC BLOOD PRESSURE: 142 MMHG | DIASTOLIC BLOOD PRESSURE: 78 MMHG

## 2023-04-26 VITALS
TEMPERATURE: 98.3 F | OXYGEN SATURATION: 99 % | HEART RATE: 91 BPM | SYSTOLIC BLOOD PRESSURE: 140 MMHG | BODY MASS INDEX: 24.75 KG/M2 | HEIGHT: 64 IN | WEIGHT: 145 LBS | DIASTOLIC BLOOD PRESSURE: 68 MMHG

## 2023-04-26 DIAGNOSIS — M54.9 DORSALGIA, UNSPECIFIED: ICD-10-CM

## 2023-04-26 DIAGNOSIS — R94.31 ABNORMAL ELECTROCARDIOGRAM [ECG] [EKG]: ICD-10-CM

## 2023-04-26 LAB
GLUCOSE BLDC GLUCOMTR-MCNC: 96
HBA1C MFR BLD HPLC: 6.5

## 2023-04-26 PROCEDURE — 99214 OFFICE O/P EST MOD 30 MIN: CPT | Mod: 25

## 2023-04-26 PROCEDURE — 82962 GLUCOSE BLOOD TEST: CPT

## 2023-04-26 PROCEDURE — 93000 ELECTROCARDIOGRAM COMPLETE: CPT

## 2023-04-26 PROCEDURE — 99214 OFFICE O/P EST MOD 30 MIN: CPT

## 2023-04-26 PROCEDURE — 83036 HEMOGLOBIN GLYCOSYLATED A1C: CPT | Mod: QW

## 2023-04-26 RX ORDER — PRAVASTATIN SODIUM 80 MG/1
80 TABLET ORAL
Qty: 90 | Refills: 3 | Status: ACTIVE | COMMUNITY
Start: 1900-01-01 | End: 1900-01-01

## 2023-04-26 NOTE — HISTORY OF PRESENT ILLNESS
[FreeTextEntry1] : This is a 71 y/o male with a pmhx of  HTN HLD DM CAD here today for a follow up. Patient feels well and had no acute concerns or complaints. Patient denies chest pain, dyspnea, palpitations, dizziness, syncope, changes in bowel/bladder habits or appetite. \par

## 2023-05-01 NOTE — HISTORY OF PRESENT ILLNESS
[FreeTextEntry1] : Mr. HAYS is a 72 year old male who returns today in follow up with regard to a history of type 2 diabetes mellitus. The Diabetes has been present for over 5 years.There is no known history of retinopathy, nephropathy. He too denies any history of neuropathy but notes occasional tingling in both feet. \par \par Current dm medication include Metformin 500mg two bid \par \par He checks BG about 2x per week. HGM of late has shown values to be running 120-135. There has been no significant hypoglycemia.\par \par  He denies any chest pain, sob, neurologic or ophthalmologic complaints. He too denies any new podiatric concerns. He is up to date with his ophthalmologic visits without hx of retinopathy.\par \par POCT A1c returned today at 6.5 % ; previously 7.3% on 12/22/22 \par POCT glucose returned today at 96 mg/dL \par \par Additional medical history includes that of htn, and hyperlipidemia. Is on losartan 100 . Switched from pravastatin 80 mg to rosuvastatin 20 mg QD on 4/26/22 given LDL 80 and has pmhx of plaque buildup in carotid artery. \par Taking Losartan 100 mg, \par \par  [FreeTextEntry1] : I, Madelin Leahy RN, attest i was present throughout the entire visit.

## 2023-05-01 NOTE — ADDENDUM
[FreeTextEntry1] : POCT HbA1c and glucose carried out in office today given diabetes diagnosis. \par Blood will be drawn in office today.

## 2023-05-23 ENCOUNTER — APPOINTMENT (OUTPATIENT)
Dept: GASTROENTEROLOGY | Facility: CLINIC | Age: 73
End: 2023-05-23

## 2023-07-17 ENCOUNTER — NON-APPOINTMENT (OUTPATIENT)
Age: 73
End: 2023-07-17

## 2023-08-11 ENCOUNTER — OUTPATIENT (OUTPATIENT)
Dept: OUTPATIENT SERVICES | Facility: HOSPITAL | Age: 73
LOS: 1 days | Discharge: HOME | End: 2023-08-11

## 2023-08-11 DIAGNOSIS — D47.3 ESSENTIAL (HEMORRHAGIC) THROMBOCYTHEMIA: ICD-10-CM

## 2023-08-11 DIAGNOSIS — M48.8X2 OTHER SPECIFIED SPONDYLOPATHIES, CERVICAL REGION: Chronic | ICD-10-CM

## 2023-08-21 ENCOUNTER — APPOINTMENT (OUTPATIENT)
Dept: HEMATOLOGY ONCOLOGY | Facility: CLINIC | Age: 73
End: 2023-08-21

## 2023-10-11 ENCOUNTER — RX RENEWAL (OUTPATIENT)
Age: 73
End: 2023-10-11

## 2023-10-11 RX ORDER — EZETIMIBE 10 MG/1
10 TABLET ORAL
Qty: 90 | Refills: 1 | Status: ACTIVE | COMMUNITY
Start: 2023-01-04 | End: 1900-01-01

## 2023-10-26 ENCOUNTER — APPOINTMENT (OUTPATIENT)
Dept: ENDOCRINOLOGY | Facility: CLINIC | Age: 73
End: 2023-10-26
Payer: MEDICARE

## 2023-10-26 ENCOUNTER — APPOINTMENT (OUTPATIENT)
Dept: CARDIOLOGY | Facility: CLINIC | Age: 73
End: 2023-10-26
Payer: MEDICARE

## 2023-10-26 VITALS
HEIGHT: 64 IN | SYSTOLIC BLOOD PRESSURE: 120 MMHG | HEART RATE: 88 BPM | WEIGHT: 138.06 LBS | TEMPERATURE: 98 F | BODY MASS INDEX: 23.57 KG/M2 | OXYGEN SATURATION: 98 % | DIASTOLIC BLOOD PRESSURE: 80 MMHG

## 2023-10-26 VITALS — WEIGHT: 138 LBS | BODY MASS INDEX: 23.56 KG/M2 | TEMPERATURE: 97.9 F | HEIGHT: 64 IN

## 2023-10-26 DIAGNOSIS — E55.9 VITAMIN D DEFICIENCY, UNSPECIFIED: ICD-10-CM

## 2023-10-26 DIAGNOSIS — I65.23 OCCLUSION AND STENOSIS OF BILATERAL CAROTID ARTERIES: ICD-10-CM

## 2023-10-26 DIAGNOSIS — E78.5 HYPERLIPIDEMIA, UNSPECIFIED: ICD-10-CM

## 2023-10-26 DIAGNOSIS — E53.8 DEFICIENCY OF OTHER SPECIFIED B GROUP VITAMINS: ICD-10-CM

## 2023-10-26 DIAGNOSIS — I25.10 ATHEROSCLEROTIC HEART DISEASE OF NATIVE CORONARY ARTERY W/OUT ANGINA PECTORIS: ICD-10-CM

## 2023-10-26 DIAGNOSIS — E11.9 TYPE 2 DIABETES MELLITUS W/OUT COMPLICATIONS: ICD-10-CM

## 2023-10-26 DIAGNOSIS — D64.9 ANEMIA, UNSPECIFIED: ICD-10-CM

## 2023-10-26 DIAGNOSIS — R79.89 OTHER SPECIFIED ABNORMAL FINDINGS OF BLOOD CHEMISTRY: ICD-10-CM

## 2023-10-26 DIAGNOSIS — Z23 ENCOUNTER FOR IMMUNIZATION: ICD-10-CM

## 2023-10-26 DIAGNOSIS — Z13.228 ENCOUNTER FOR SCREENING FOR OTHER METABOLIC DISORDERS: ICD-10-CM

## 2023-10-26 DIAGNOSIS — I10 ESSENTIAL (PRIMARY) HYPERTENSION: ICD-10-CM

## 2023-10-26 DIAGNOSIS — R03.0 ELEVATED BLOOD-PRESSURE READING, W/OUT DIAGNOSIS OF HYPERTENSION: ICD-10-CM

## 2023-10-26 LAB
GLUCOSE BLDC GLUCOMTR-MCNC: 134
HBA1C MFR BLD HPLC: 6.3

## 2023-10-26 PROCEDURE — G0008: CPT

## 2023-10-26 PROCEDURE — 95249 CONT GLUC MNTR PT PROV EQP: CPT

## 2023-10-26 PROCEDURE — 99214 OFFICE O/P EST MOD 30 MIN: CPT | Mod: 25

## 2023-10-26 PROCEDURE — 83036 HEMOGLOBIN GLYCOSYLATED A1C: CPT | Mod: QW

## 2023-10-26 PROCEDURE — 36415 COLL VENOUS BLD VENIPUNCTURE: CPT

## 2023-10-26 PROCEDURE — 93000 ELECTROCARDIOGRAM COMPLETE: CPT

## 2023-10-26 PROCEDURE — 90662 IIV NO PRSV INCREASED AG IM: CPT

## 2023-10-26 PROCEDURE — 82962 GLUCOSE BLOOD TEST: CPT

## 2023-10-26 RX ORDER — METOPROLOL SUCCINATE 25 MG/1
25 TABLET, EXTENDED RELEASE ORAL
Qty: 30 | Refills: 1 | Status: DISCONTINUED | COMMUNITY
Start: 2023-01-24 | End: 2023-10-26

## 2023-10-26 RX ORDER — LOSARTAN POTASSIUM 100 MG/1
100 TABLET, FILM COATED ORAL DAILY
Qty: 90 | Refills: 3 | Status: ACTIVE | COMMUNITY
Start: 1900-01-01 | End: 1900-01-01

## 2023-10-26 RX ORDER — METFORMIN HYDROCHLORIDE 500 MG/1
500 TABLET, COATED ORAL TWICE DAILY
Qty: 360 | Refills: 3 | Status: ACTIVE | COMMUNITY
Start: 2019-05-17 | End: 1900-01-01

## 2024-01-25 ENCOUNTER — RX RENEWAL (OUTPATIENT)
Age: 74
End: 2024-01-25

## 2024-02-20 ENCOUNTER — APPOINTMENT (OUTPATIENT)
Dept: UROLOGY | Facility: CLINIC | Age: 74
End: 2024-02-20

## 2024-03-13 ENCOUNTER — APPOINTMENT (OUTPATIENT)
Dept: UROLOGY | Facility: CLINIC | Age: 74
End: 2024-03-13

## 2024-04-03 NOTE — DISCHARGE NOTE PROVIDER - NSRESEARCHGRANT_MLMHIDDEN_GEN_A_CORE
LOV: 12/14/2023    RTC: 3 months   Future Appointments   Date Time Provider Department Center   4/15/2024 11:40 AM [facility] [place]   LABS: not completed at this time.     LAST REFILL: 2/2/2024, Quantity 30 tablets, Refills 1    On call provider-- orders pending, approve if agreeable.   
yes

## 2024-04-16 ENCOUNTER — APPOINTMENT (OUTPATIENT)
Dept: UROLOGY | Facility: CLINIC | Age: 74
End: 2024-04-16
Payer: MEDICARE

## 2024-04-16 DIAGNOSIS — N40.1 BENIGN PROSTATIC HYPERPLASIA WITH LOWER URINARY TRACT SYMPMS: ICD-10-CM

## 2024-04-16 PROCEDURE — 99213 OFFICE O/P EST LOW 20 MIN: CPT

## 2024-04-16 PROCEDURE — G2211 COMPLEX E/M VISIT ADD ON: CPT

## 2024-04-16 RX ORDER — TAMSULOSIN HYDROCHLORIDE 0.4 MG/1
0.4 CAPSULE ORAL
Qty: 90 | Refills: 3 | Status: ACTIVE | COMMUNITY
Start: 2023-01-24 | End: 1900-01-01

## 2024-04-16 NOTE — HISTORY OF PRESENT ILLNESS
[FreeTextEntry1] : CARIE HAYS is a 72 year old who presents today as a new patient as a HFU for urinary retention.  He went to [facility] 1/20/2023 for chest pain s/p cardiac cath, post cath he had urinary retention, urology placed a 18fFr coude. He was discharged home with Tamsulosin and was told to follow up with urology.   At baseline  the patient reports nocturia x3 and a weak stream. Patient denies ever having urinary frequency, urgency, incontinence/dribbling, dysuria, hematuria, or incomplete emptying. He reports good urine output with the holcomb, yellow and clear. No prior retention.    Denies family hx of urological disorders. Social hx: Nonsmoker    2/23 passed TOV. stayed on tamsulosin. Feels voiding easier, stronger FOS and feels empty. Notes less nocturia.  No UTI symptoms.   4/24 0 yearly f/u on tamsulosin. decent FOS with no hesitancy, pushing or intermittency. Nocturia 1-2 times and no bothersome frequency or urgency. no dysuria or hematuria    Female

## 2024-04-28 LAB
25(OH)D3 SERPL-MCNC: 45.4 NG/ML
ANION GAP SERPL CALC-SCNC: 13 MMOL/L
ANION GAP SERPL CALC-SCNC: 14 MMOL/L
APPEARANCE: CLEAR
APPEARANCE: CLEAR
BACTERIA: NEGATIVE /HPF
BACTERIA: NEGATIVE /HPF
BASOPHILS # BLD AUTO: 0.04 K/UL
BASOPHILS # BLD AUTO: 0.05 K/UL
BASOPHILS NFR BLD AUTO: 0.5 %
BASOPHILS NFR BLD AUTO: 0.6 %
BILIRUBIN URINE: NEGATIVE
BILIRUBIN URINE: NEGATIVE
BLOOD URINE: NEGATIVE
BLOOD URINE: NEGATIVE
BUN SERPL-MCNC: 18 MG/DL
BUN SERPL-MCNC: 22 MG/DL
CALCIUM SERPL-MCNC: 10.3 MG/DL
CALCIUM SERPL-MCNC: 9.4 MG/DL
CAST: 1 /LPF
CAST: 3 /LPF
CHLORIDE SERPL-SCNC: 100 MMOL/L
CHLORIDE SERPL-SCNC: 102 MMOL/L
CO2 SERPL-SCNC: 22 MMOL/L
CO2 SERPL-SCNC: 22 MMOL/L
COLOR: YELLOW
COLOR: YELLOW
CREAT SERPL-MCNC: 1.3 MG/DL
CREAT SERPL-MCNC: 1.36 MG/DL
EGFR: 55 ML/MIN/1.73M2
EGFR: 58 ML/MIN/1.73M2
EOSINOPHIL # BLD AUTO: 0.37 K/UL
EOSINOPHIL # BLD AUTO: 0.4 K/UL
EOSINOPHIL NFR BLD AUTO: 4.6 %
EOSINOPHIL NFR BLD AUTO: 5.2 %
EPITHELIAL CELLS: 0 /HPF
EPITHELIAL CELLS: 2 /HPF
ESTIMATED AVERAGE GLUCOSE: 146 MG/DL
FERRITIN SERPL-MCNC: 34 NG/ML
FERRITIN SERPL-MCNC: 53 NG/ML
FOLATE SERPL-MCNC: 15 NG/ML
FOLATE SERPL-MCNC: >20 NG/ML
GLUCOSE QUALITATIVE U: NEGATIVE MG/DL
GLUCOSE QUALITATIVE U: NEGATIVE MG/DL
GLUCOSE SERPL-MCNC: 107 MG/DL
GLUCOSE SERPL-MCNC: 130 MG/DL
HBA1C MFR BLD HPLC: 6.7 %
HCT VFR BLD CALC: 36.8 %
HCT VFR BLD CALC: 43.1 %
HGB BLD-MCNC: 11.2 G/DL
HGB BLD-MCNC: 13.4 G/DL
IMM GRANULOCYTES NFR BLD AUTO: 0.1 %
IMM GRANULOCYTES NFR BLD AUTO: 0.3 %
IRON SATN MFR SERPL: 20 %
IRON SATN MFR SERPL: 43 %
IRON SERPL-MCNC: 145 UG/DL
IRON SERPL-MCNC: 82 UG/DL
KETONES URINE: ABNORMAL MG/DL
KETONES URINE: NEGATIVE MG/DL
LEUKOCYTE ESTERASE URINE: ABNORMAL
LEUKOCYTE ESTERASE URINE: ABNORMAL
LYMPHOCYTES # BLD AUTO: 1.93 K/UL
LYMPHOCYTES # BLD AUTO: 2.41 K/UL
LYMPHOCYTES NFR BLD AUTO: 25.3 %
LYMPHOCYTES NFR BLD AUTO: 30.1 %
MAN DIFF?: NORMAL
MAN DIFF?: NORMAL
MCHC RBC-ENTMCNC: 25.7 PG
MCHC RBC-ENTMCNC: 28 PG
MCHC RBC-ENTMCNC: 30.4 GM/DL
MCHC RBC-ENTMCNC: 31.1 GM/DL
MCV RBC AUTO: 84.6 FL
MCV RBC AUTO: 90.2 FL
MICROSCOPIC-UA: NORMAL
MICROSCOPIC-UA: NORMAL
MONOCYTES # BLD AUTO: 0.58 K/UL
MONOCYTES # BLD AUTO: 0.58 K/UL
MONOCYTES NFR BLD AUTO: 7.3 %
MONOCYTES NFR BLD AUTO: 7.6 %
NEUTROPHILS # BLD AUTO: 4.57 K/UL
NEUTROPHILS # BLD AUTO: 4.67 K/UL
NEUTROPHILS NFR BLD AUTO: 57.1 %
NEUTROPHILS NFR BLD AUTO: 61.3 %
NITRITE URINE: NEGATIVE
NITRITE URINE: NEGATIVE
PH URINE: 5.5
PH URINE: 6
PLATELET # BLD AUTO: 419 K/UL
PLATELET # BLD AUTO: 434 K/UL
POTASSIUM SERPL-SCNC: 4.8 MMOL/L
POTASSIUM SERPL-SCNC: 5 MMOL/L
PROTEIN URINE: NEGATIVE MG/DL
PROTEIN URINE: NORMAL MG/DL
RBC # BLD: 4.35 M/UL
RBC # BLD: 4.78 M/UL
RBC # FLD: 13.1 %
RBC # FLD: 14.3 %
RED BLOOD CELLS URINE: 0 /HPF
RED BLOOD CELLS URINE: 1 /HPF
SODIUM SERPL-SCNC: 137 MMOL/L
SODIUM SERPL-SCNC: 137 MMOL/L
SPECIFIC GRAVITY URINE: 1.02
SPECIFIC GRAVITY URINE: 1.02
T4 FREE SERPL-MCNC: 1.3 NG/DL
TIBC SERPL-MCNC: 337 UG/DL
TIBC SERPL-MCNC: 409 UG/DL
TSH SERPL-ACNC: 3.47 UIU/ML
UIBC SERPL-MCNC: 192 UG/DL
UIBC SERPL-MCNC: 326 UG/DL
UROBILINOGEN URINE: 0.2 MG/DL
UROBILINOGEN URINE: 1 MG/DL
VIT B12 SERPL-MCNC: 301 PG/ML
WBC # FLD AUTO: 7.63 K/UL
WBC # FLD AUTO: 8 K/UL
WHITE BLOOD CELLS URINE: 1 /HPF
WHITE BLOOD CELLS URINE: 4 /HPF

## 2024-05-06 ENCOUNTER — APPOINTMENT (OUTPATIENT)
Dept: ENDOCRINOLOGY | Facility: CLINIC | Age: 74
End: 2024-05-06

## 2024-05-06 ENCOUNTER — APPOINTMENT (OUTPATIENT)
Dept: CARDIOLOGY | Facility: CLINIC | Age: 74
End: 2024-05-06

## 2024-05-06 NOTE — HISTORY OF PRESENT ILLNESS
[FreeTextEntry1] : Mr. HAYS is a 73 year old male who returns today in follow up with regard to a history of type 2 diabetes mellitus. The Diabetes has been present for about 6-7 years/.There is no known history of retinopathy, nephropathy. He too denies any history of neuropathy but notes occasional tingling in gutierrez feet.   Current dm medication include Metformin 500mg two bid  HGM of late has shown values to be running      There has been no significant hypoglycemia.   He denies any chest pain, sob, neurologic or ophthalmologic complaints. He too denies any new podiatric concerns. He is up to date with his ophthalmologic visits without hx of retinopathy.  POCT A1c returned today at   %  POCT glucose returned today at  mg/dL   Additional medical history includes that of htn, and hyperlipidemia. Is on losartan 100 mg . Switched from pravastatin 80 mg to rosuvastatin 20 mg QD and Ezetimibe-does have pmhx of plaque buildup in carotid artery.

## 2024-05-23 NOTE — HISTORY OF PRESENT ILLNESS
[Preoperative Visit] : for a medical evaluation prior to surgery [Scheduled Procedure ___] : a [unfilled] [Date of Surgery ___] : on [unfilled] [Surgeon Name ___] : surgeon: [unfilled] [FreeTextEntry1] : Hair is a 70yo male with PMH anemia, DM2, HLD, and lumbar disc disease presents today for medical clearance. Patient is scheduled for left cataract surgery on 9/29/20 with Dr. Chavez. patient reports he is feeling well today. Patient denies chest pain, shortness of breath, palpitations, dizziness, vision changes, n/v, abdominal pain, changes in bowel/bladder habits,  or appetite.  No

## 2024-09-16 ENCOUNTER — APPOINTMENT (OUTPATIENT)
Dept: CARDIOLOGY | Facility: CLINIC | Age: 74
End: 2024-09-16
Payer: MEDICARE

## 2024-09-16 ENCOUNTER — APPOINTMENT (OUTPATIENT)
Dept: ENDOCRINOLOGY | Facility: CLINIC | Age: 74
End: 2024-09-16
Payer: MEDICARE

## 2024-09-16 VITALS
HEIGHT: 64 IN | OXYGEN SATURATION: 99 % | TEMPERATURE: 98.1 F | HEART RATE: 95 BPM | WEIGHT: 139 LBS | DIASTOLIC BLOOD PRESSURE: 70 MMHG | BODY MASS INDEX: 23.73 KG/M2 | SYSTOLIC BLOOD PRESSURE: 146 MMHG

## 2024-09-16 VITALS
DIASTOLIC BLOOD PRESSURE: 70 MMHG | WEIGHT: 139 LBS | SYSTOLIC BLOOD PRESSURE: 146 MMHG | OXYGEN SATURATION: 99 % | HEART RATE: 95 BPM | TEMPERATURE: 98.1 F | HEIGHT: 64 IN | BODY MASS INDEX: 23.73 KG/M2

## 2024-09-16 DIAGNOSIS — I65.23 OCCLUSION AND STENOSIS OF BILATERAL CAROTID ARTERIES: ICD-10-CM

## 2024-09-16 DIAGNOSIS — Z12.11 ENCOUNTER FOR SCREENING FOR MALIGNANT NEOPLASM OF COLON: ICD-10-CM

## 2024-09-16 DIAGNOSIS — R93.1 ABNORMAL FINDINGS ON DIAGNOSTIC IMAGING OF HEART AND CORONARY CIRCULATION: ICD-10-CM

## 2024-09-16 DIAGNOSIS — Z12.5 ENCOUNTER FOR SCREENING FOR MALIGNANT NEOPLASM OF PROSTATE: ICD-10-CM

## 2024-09-16 DIAGNOSIS — Z13.228 ENCOUNTER FOR SCREENING FOR OTHER METABOLIC DISORDERS: ICD-10-CM

## 2024-09-16 DIAGNOSIS — E78.5 HYPERLIPIDEMIA, UNSPECIFIED: ICD-10-CM

## 2024-09-16 DIAGNOSIS — E55.9 VITAMIN D DEFICIENCY, UNSPECIFIED: ICD-10-CM

## 2024-09-16 DIAGNOSIS — Z71.85 ENCOUNTER FOR IMMUNIZATION SAFETY COUNSELING: ICD-10-CM

## 2024-09-16 LAB
GLUCOSE BLDC GLUCOMTR-MCNC: 95
HBA1C MFR BLD HPLC: 7.1

## 2024-09-16 PROCEDURE — 93000 ELECTROCARDIOGRAM COMPLETE: CPT

## 2024-09-16 PROCEDURE — G2211 COMPLEX E/M VISIT ADD ON: CPT | Mod: NC

## 2024-09-16 PROCEDURE — 82962 GLUCOSE BLOOD TEST: CPT

## 2024-09-16 PROCEDURE — 93880 EXTRACRANIAL BILAT STUDY: CPT

## 2024-09-16 PROCEDURE — 99214 OFFICE O/P EST MOD 30 MIN: CPT | Mod: 25

## 2024-09-16 PROCEDURE — 83036 HEMOGLOBIN GLYCOSYLATED A1C: CPT | Mod: QW

## 2024-09-16 PROCEDURE — 93306 TTE W/DOPPLER COMPLETE: CPT

## 2024-09-16 NOTE — REVIEW OF SYSTEMS
[Negative] : Heme/Lymph [Dyspnea on exertion] : dyspnea during exertion [Chest Discomfort] : chest discomfort [FreeTextEntry2] : see hpi

## 2024-09-16 NOTE — HISTORY OF PRESENT ILLNESS
[FreeTextEntry1] : This is a 73 year y/o male with a PMHx of  HTN, HLD, DM, CAD presents today for follow up. Pt reports when he walks a long distance, he reports a burning sensation in his chest area, he also reported some heavier breathing when he walks a faster pace for the past few months. He reports when he stops/relaxes, the burning sensation resolves. Pt is s/p stent placement 1/2023, reports noncompliance with plavix. Denies chest pain, palpitations, diaphoresis, vision changes, HA, dizziness, syncope, cough, wheezing, SOB/LOZANO, edema, fever, chills, infection.

## 2024-09-17 ENCOUNTER — OUTPATIENT (OUTPATIENT)
Dept: OUTPATIENT SERVICES | Facility: HOSPITAL | Age: 74
LOS: 1 days | End: 2024-09-17
Payer: MEDICARE

## 2024-09-17 ENCOUNTER — TRANSCRIPTION ENCOUNTER (OUTPATIENT)
Age: 74
End: 2024-09-17

## 2024-09-17 VITALS
HEART RATE: 95 BPM | DIASTOLIC BLOOD PRESSURE: 71 MMHG | TEMPERATURE: 98 F | OXYGEN SATURATION: 100 % | SYSTOLIC BLOOD PRESSURE: 157 MMHG | RESPIRATION RATE: 18 BRPM

## 2024-09-17 VITALS
HEIGHT: 63 IN | SYSTOLIC BLOOD PRESSURE: 176 MMHG | DIASTOLIC BLOOD PRESSURE: 91 MMHG | RESPIRATION RATE: 18 BRPM | OXYGEN SATURATION: 98 % | TEMPERATURE: 97 F | HEART RATE: 71 BPM | WEIGHT: 138.89 LBS

## 2024-09-17 DIAGNOSIS — R07.89 OTHER CHEST PAIN: ICD-10-CM

## 2024-09-17 DIAGNOSIS — Z95.5 PRESENCE OF CORONARY ANGIOPLASTY IMPLANT AND GRAFT: Chronic | ICD-10-CM

## 2024-09-17 DIAGNOSIS — M48.8X2 OTHER SPECIFIED SPONDYLOPATHIES, CERVICAL REGION: Chronic | ICD-10-CM

## 2024-09-17 LAB
ADD ON TEST-SPECIMEN IN LAB: SIGNIFICANT CHANGE UP
ANION GAP SERPL CALC-SCNC: 16 MMOL/L — SIGNIFICANT CHANGE UP (ref 5–17)
BUN SERPL-MCNC: 25 MG/DL — HIGH (ref 7–23)
CALCIUM SERPL-MCNC: 10.2 MG/DL — SIGNIFICANT CHANGE UP (ref 8.4–10.5)
CHLORIDE SERPL-SCNC: 104 MMOL/L — SIGNIFICANT CHANGE UP (ref 96–108)
CO2 SERPL-SCNC: 21 MMOL/L — LOW (ref 22–31)
CREAT SERPL-MCNC: 1.36 MG/DL — HIGH (ref 0.5–1.3)
EGFR: 54 ML/MIN/1.73M2 — LOW
GLUCOSE BLDC GLUCOMTR-MCNC: 115 MG/DL — HIGH (ref 70–99)
GLUCOSE BLDC GLUCOMTR-MCNC: 133 MG/DL — HIGH (ref 70–99)
GLUCOSE BLDC GLUCOMTR-MCNC: 209 MG/DL — HIGH (ref 70–99)
GLUCOSE SERPL-MCNC: 133 MG/DL — HIGH (ref 70–99)
HCT VFR BLD CALC: 39 % — SIGNIFICANT CHANGE UP (ref 39–50)
HGB BLD-MCNC: 12.6 G/DL — LOW (ref 13–17)
MCHC RBC-ENTMCNC: 28.1 PG — SIGNIFICANT CHANGE UP (ref 27–34)
MCHC RBC-ENTMCNC: 32.3 GM/DL — SIGNIFICANT CHANGE UP (ref 32–36)
MCV RBC AUTO: 86.9 FL — SIGNIFICANT CHANGE UP (ref 80–100)
NRBC # BLD: 0 /100 WBCS — SIGNIFICANT CHANGE UP (ref 0–0)
PLATELET # BLD AUTO: 376 K/UL — SIGNIFICANT CHANGE UP (ref 150–400)
POTASSIUM SERPL-MCNC: 5.2 MMOL/L — SIGNIFICANT CHANGE UP (ref 3.5–5.3)
POTASSIUM SERPL-SCNC: 5.2 MMOL/L — SIGNIFICANT CHANGE UP (ref 3.5–5.3)
RBC # BLD: 4.49 M/UL — SIGNIFICANT CHANGE UP (ref 4.2–5.8)
RBC # FLD: 12.6 % — SIGNIFICANT CHANGE UP (ref 10.3–14.5)
SODIUM SERPL-SCNC: 141 MMOL/L — SIGNIFICANT CHANGE UP (ref 135–145)
TROPONIN T, HIGH SENSITIVITY RESULT: 31 NG/L — SIGNIFICANT CHANGE UP (ref 0–51)
WBC # BLD: 8.04 K/UL — SIGNIFICANT CHANGE UP (ref 3.8–10.5)
WBC # FLD AUTO: 8.04 K/UL — SIGNIFICANT CHANGE UP (ref 3.8–10.5)

## 2024-09-17 PROCEDURE — C1725: CPT

## 2024-09-17 PROCEDURE — 80048 BASIC METABOLIC PNL TOTAL CA: CPT

## 2024-09-17 PROCEDURE — C1894: CPT

## 2024-09-17 PROCEDURE — 99152 MOD SED SAME PHYS/QHP 5/>YRS: CPT

## 2024-09-17 PROCEDURE — 93454 CORONARY ARTERY ANGIO S&I: CPT | Mod: 26,59

## 2024-09-17 PROCEDURE — 84484 ASSAY OF TROPONIN QUANT: CPT

## 2024-09-17 PROCEDURE — 93010 ELECTROCARDIOGRAM REPORT: CPT

## 2024-09-17 PROCEDURE — 82962 GLUCOSE BLOOD TEST: CPT

## 2024-09-17 PROCEDURE — 80061 LIPID PANEL: CPT

## 2024-09-17 PROCEDURE — 93005 ELECTROCARDIOGRAM TRACING: CPT

## 2024-09-17 PROCEDURE — 83036 HEMOGLOBIN GLYCOSYLATED A1C: CPT

## 2024-09-17 PROCEDURE — C1769: CPT

## 2024-09-17 PROCEDURE — C9600: CPT | Mod: LD

## 2024-09-17 PROCEDURE — 85027 COMPLETE CBC AUTOMATED: CPT

## 2024-09-17 PROCEDURE — 92928 PRQ TCAT PLMT NTRAC ST 1 LES: CPT | Mod: LD

## 2024-09-17 PROCEDURE — 93454 CORONARY ARTERY ANGIO S&I: CPT | Mod: 59

## 2024-09-17 PROCEDURE — C1874: CPT

## 2024-09-17 PROCEDURE — C1887: CPT

## 2024-09-17 RX ORDER — EMPAGLIFLOZIN 10 MG/1
10 TABLET, FILM COATED ORAL
Qty: 90 | Refills: 2 | Status: ACTIVE | COMMUNITY
Start: 2024-09-17 | End: 1900-01-01

## 2024-09-17 RX ORDER — SODIUM CHLORIDE 9 MG/ML
1000 INJECTION INTRAMUSCULAR; INTRAVENOUS; SUBCUTANEOUS
Refills: 0 | Status: DISCONTINUED | OUTPATIENT
Start: 2024-09-17 | End: 2024-10-01

## 2024-09-17 RX ORDER — DEXTROSE 15 G/33 G
25 GEL IN PACKET (GRAM) ORAL ONCE
Refills: 0 | Status: DISCONTINUED | OUTPATIENT
Start: 2024-09-17 | End: 2024-10-01

## 2024-09-17 RX ORDER — DEXTROSE 15 G/33 G
12.5 GEL IN PACKET (GRAM) ORAL ONCE
Refills: 0 | Status: DISCONTINUED | OUTPATIENT
Start: 2024-09-17 | End: 2024-10-01

## 2024-09-17 RX ORDER — DEXTROSE 15 G/33 G
15 GEL IN PACKET (GRAM) ORAL ONCE
Refills: 0 | Status: DISCONTINUED | OUTPATIENT
Start: 2024-09-17 | End: 2024-10-01

## 2024-09-17 RX ORDER — GLUCAGON INJECTION, SOLUTION 1 MG/.2ML
1 INJECTION, SOLUTION SUBCUTANEOUS ONCE
Refills: 0 | Status: DISCONTINUED | OUTPATIENT
Start: 2024-09-17 | End: 2024-10-01

## 2024-09-17 RX ORDER — LOSARTAN POTASSIUM 50 MG/1
100 TABLET ORAL DAILY
Refills: 0 | Status: DISCONTINUED | OUTPATIENT
Start: 2024-09-17 | End: 2024-10-01

## 2024-09-17 RX ORDER — SODIUM CHLORIDE 9 MG/ML
250 INJECTION INTRAMUSCULAR; INTRAVENOUS; SUBCUTANEOUS ONCE
Refills: 0 | Status: COMPLETED | OUTPATIENT
Start: 2024-09-17 | End: 2024-09-17

## 2024-09-17 RX ORDER — ASPIRIN 81 MG
81 TABLET, DELAYED RELEASE (ENTERIC COATED) ORAL DAILY
Refills: 0 | Status: DISCONTINUED | OUTPATIENT
Start: 2024-09-17 | End: 2024-10-01

## 2024-09-17 RX ADMIN — Medication 2: at 16:48

## 2024-09-17 RX ADMIN — LOSARTAN POTASSIUM 100 MILLIGRAM(S): 50 TABLET ORAL at 16:16

## 2024-09-17 RX ADMIN — SODIUM CHLORIDE 100 MILLILITER(S): 9 INJECTION INTRAMUSCULAR; INTRAVENOUS; SUBCUTANEOUS at 13:38

## 2024-09-17 RX ADMIN — SODIUM CHLORIDE 750 MILLILITER(S): 9 INJECTION INTRAMUSCULAR; INTRAVENOUS; SUBCUTANEOUS at 11:27

## 2024-09-17 NOTE — ASU DISCHARGE PLAN (ADULT/PEDIATRIC) - CARE PROVIDER_API CALL
Jose Gibbons  Cardiology  [address], [address]  [place] [address]  Phone: [phone]  Fax: [phone]  Established Patient  Scheduled Appointment: 09/20/2024

## 2024-09-17 NOTE — H&P CARDIOLOGY - HISTORY OF PRESENT ILLNESS
72 year old male with PMH DM (T2), HTN, HLD. CAD/Stent 2023 presents with chest discomfort.  Pt reports when he walks a long distance, he reports a burning sensation in his chest area, he also reported some heavier breathing when he walks a faster pace for the past few months. He reports when he stops/relaxes, the burning sensation resolves. Pt reports noncompliance with plavix. Denies chest pain, palpitations, diaphoresis, vision changes, HA, dizziness, syncope, cough, wheezing, SOB/LOZANO, edema, fever, chills, infection.     72 year old male with PMH DM (T2, Hgba1c 7.1, pt follows Endo Dr Saman Sheppard), HTN, HLD. CAD/Stent 2023 presents with chest discomfort.  Pt reports when he walks a long distance, he reports a burning sensation in his chest area, he also reported some heavier breathing when he walks a faster pace for the past few months. He reports when he stops/relaxes, the burning sensation resolves. Pt reports noncompliance with plavix. Denies chest pain, palpitations, diaphoresis, vision changes, HA, dizziness, syncope, cough, wheezing, SOB/LOZANO, edema, fever, chills, infection. Pt was referred for Cardiac cath by Dr Gibbons.      72 year old male with PMH DM (T2, Hgba1c 7.1, pt follows Endo Dr Saman Sheppard), HTN, HLD. CAD/90% pLCx and 90% OM1 stenosis s/p successful intervention with 1 ZHOU to pLCx and POBA of OM 1/2023 presents with chest discomfort.  Pt reports when he walks a long distance, he reports a burning sensation in his chest area, he also reported some heavier breathing when he walks a faster pace for the past few months. He reports when he stops/relaxes, the burning sensation resolves. Pt reports noncompliance with plavix. Denies chest pain, palpitations, diaphoresis, vision changes, HA, dizziness, syncope, cough, wheezing, SOB/LOZANO, edema, fever, chills, infection. Pt was referred for Cardiac cath by Dr Gibbons.     < from: Cardiac Catheterization (01.20.23 @ 12:46) >  dications:                Abnormal Cardiovascular Testing   Stable angina     Conclusions:   90% pLCx and 90% OM1 stenosis s/p successful intervention with 1 ZHOU  to pLCx and POBA of OM1.    80% mD1 stenosis with plan for medical management.    Recommendations:     Optimize medical management.    DAPT x 1 year.    Lifestlye modification with weight loss, diet and exercise.    Goal LDL < 70.      Presentation:   71 YO male with DM2, HTN, HLD, presents for [facility] in setting of abnormal  coronary CTA performed for  exertional chest pain relieved with rest.      Procedure Narrative:   The risks and alternatives of the procedures and conscious sedation  were explained to the patient and informed consent was  obtained. The patient was brought to the cath lab and placed on the  exam table.  Access   Right radial artery:   The puncture site was infiltrated with 1% Lidocaine. Vascular access  was obtained using modified seldinger technique.  Hemostasis/Sheath Status: Hemostasis was successful using mechanical  compression.    Diagnostic Findings:     Patient: CARIE HAYS       MRN: [id number]  Study Date: 01/20/2023   12:46 PM      Page 1 of 4          Coronary Angiography   The coronary circulation is left dominant.      LM   Left main artery: Angiography shows minor irregularities.      LAD   Left anterior descending artery: Angiography shows mild  atherosclerosis. First diagonal: The segment is small. There is a 70 %  stenosis in the middle third portion of the segment.      CX   Proximal circumflex: There is a 90 % stenosis. An intervention was  performed. First obtuse marginal: There is a 90 % stenosis.  An intervention was performed.      RCA   Right coronary artery: The segment is small, non-dominant.      < end of copied text >

## 2024-09-17 NOTE — ASU PATIENT PROFILE, ADULT - FALL HARM RISK - UNIVERSAL INTERVENTIONS
Bed in lowest position, wheels locked, appropriate side rails in place/Call bell, personal items and telephone in reach/Instruct patient to call for assistance before getting out of bed or chair/Non-slip footwear when patient is out of bed/[place] to call system/Physically safe environment - no spills, clutter or unnecessary equipment/Purposeful Proactive Rounding/Room/bathroom lighting operational, light cord in reach

## 2024-09-17 NOTE — ASU DISCHARGE PLAN (ADULT/PEDIATRIC) - COMMENTS
Follow-up with Dr. Gibbons on Friday 9/20 to have your blood work repeated to check your kidney function

## 2024-09-17 NOTE — H&P CARDIOLOGY - NSICDXPASTSURGICALHX_GEN_ALL_CORE_FT
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

## 2024-09-18 LAB
A1C WITH ESTIMATED AVERAGE GLUCOSE RESULT: 6.7 % — HIGH (ref 4–5.6)
CHOLEST SERPL-MCNC: 151 MG/DL — SIGNIFICANT CHANGE UP
ESTIMATED AVERAGE GLUCOSE: 146 MG/DL — HIGH (ref 68–114)
HDLC SERPL-MCNC: 56 MG/DL — SIGNIFICANT CHANGE UP
LIPID PNL WITH DIRECT LDL SERPL: 77 MG/DL — SIGNIFICANT CHANGE UP
NON HDL CHOLESTEROL: 95 MG/DL — SIGNIFICANT CHANGE UP
TRIGL SERPL-MCNC: 98 MG/DL — SIGNIFICANT CHANGE UP

## 2024-09-18 RX ORDER — IRON POLYSACCHARIDE COMPLEX 150 MG
150 CAPSULE ORAL
Qty: 30 | Refills: 2 | Status: ACTIVE | COMMUNITY
Start: 2024-09-18 | End: 1900-01-01

## 2024-09-18 RX ORDER — MULTIVIT-MIN/IRON/FOLIC ACID/K 18-600-40
500 CAPSULE ORAL
Qty: 30 | Refills: 2 | Status: ACTIVE | COMMUNITY
Start: 2024-09-18 | End: 1900-01-01

## 2024-09-21 NOTE — PHYSICAL EXAM
[Alert] : alert [Well Nourished] : well nourished [No Acute Distress] : no acute distress [Well Developed] : well developed [Normal Sclera/Conjunctiva] : normal sclera/conjunctiva [EOMI] : extra ocular movement intact [Normal Oropharynx] : the oropharynx was normal [No Proptosis] : no proptosis [Thyroid Not Enlarged] : the thyroid was not enlarged [No Thyroid Nodules] : no palpable thyroid nodules [No Respiratory Distress] : no respiratory distress [No Accessory Muscle Use] : no accessory muscle use [Clear to Auscultation] : lungs were clear to auscultation bilaterally [Normal S1, S2] : normal S1 and S2 [Normal Rate] : heart rate was normal [Regular Rhythm] : with a regular rhythm [No Edema] : no peripheral edema [Pedal Pulses Normal] : the pedal pulses are present [Normal Bowel Sounds] : normal bowel sounds [Not Tender] : non-tender [Not Distended] : not distended [Soft] : abdomen soft [Normal Anterior Cervical Nodes] : no anterior cervical lymphadenopathy [Normal Posterior Cervical Nodes] : no posterior cervical lymphadenopathy [No Spinal Tenderness] : no spinal tenderness [Spine Straight] : spine straight [Normal Gait] : normal gait [No Stigmata of Cushings Syndrome] : no stigmata of Cushings Syndrome [Normal Strength/Tone] : muscle strength and tone were normal [No Rash] : no rash [Normal Reflexes] : deep tendon reflexes were 2+ and symmetric [No Tremors] : no tremors [Oriented x3] : oriented to person, place, and time [Acanthosis Nigricans] : no acanthosis nigricans

## 2024-09-21 NOTE — HISTORY OF PRESENT ILLNESS
[FreeTextEntry1] : Mr. HAYS is a 73-year-old male who returns today in follow up with regard to a history of type 2 diabetes mellitus. The diabetes has been present for over 5 years. There is no known history of retinopathy, or nephropathy. He too denies any history of neuropathy but notes occasional tingling in gutierrez feet.  Current DM medication include Metformin 500mg two tabs BID. Overall, tolerating the medication well.   Home glucose monitoring has shown values of late to be He does deny any significant hypoglycemic signs and symptoms of late.  ***Had stent last January Now with burning chest with walking-to have cauterization tomorrow.  He states that he has been eating less. Patient's weight is down 7 lbs since April 2023.  Current weight:   lbs  POCT A1C returned today at 7.1 % %, previously % in POCT glucose today at 95  mg/dL.  He denies any chest pain, sob, neurologic or ophthalmologic complaints. He too denies any new podiatric concerns. He is up to date with his ophthalmologic visit- no diabetic changes noted.  ____________________________________________________________________________________________________  Additional medical history includes that of hypertension, hyperlipidemia Additional Medications: Losartan 100mg, Pravastatin 80mg. Supplements: vitamin D, vitamin B12 and vitamin E intermittently

## 2024-09-23 LAB
25(OH)D3 SERPL-MCNC: 47.6 NG/ML
ALBUMIN SERPL ELPH-MCNC: 4.9 G/DL
ALP BLD-CCNC: 71 U/L
ALT SERPL-CCNC: 11 U/L
ANION GAP SERPL CALC-SCNC: 15 MMOL/L
ANION GAP SERPL CALC-SCNC: 15 MMOL/L
APPEARANCE: CLEAR
APPEARANCE: CLEAR
AST SERPL-CCNC: 16 U/L
BACTERIA UR CULT: NORMAL
BACTERIA: NEGATIVE /HPF
BACTERIA: NEGATIVE /HPF
BASOPHILS # BLD AUTO: 0.07 K/UL
BASOPHILS NFR BLD AUTO: 0.6 %
BILIRUB DIRECT SERPL-MCNC: 0.2 MG/DL
BILIRUB INDIRECT SERPL-MCNC: 0.6 MG/DL
BILIRUB SERPL-MCNC: 0.8 MG/DL
BILIRUBIN URINE: NEGATIVE
BILIRUBIN URINE: NEGATIVE
BLOOD URINE: NEGATIVE
BLOOD URINE: NEGATIVE
BUN SERPL-MCNC: 20 MG/DL
BUN SERPL-MCNC: 23 MG/DL
CALCIUM SERPL-MCNC: 10.3 MG/DL
CALCIUM SERPL-MCNC: 9.3 MG/DL
CAST: 0 /LPF
CAST: 0 /LPF
CHLORIDE SERPL-SCNC: 103 MMOL/L
CHLORIDE SERPL-SCNC: 104 MMOL/L
CHOLEST SERPL-MCNC: 173 MG/DL
CK SERPL-CCNC: 82 U/L
CO2 SERPL-SCNC: 20 MMOL/L
CO2 SERPL-SCNC: 22 MMOL/L
COLOR: YELLOW
COLOR: YELLOW
CREAT SERPL-MCNC: 1.36 MG/DL
CREAT SERPL-MCNC: 1.53 MG/DL
CREAT SPEC-SCNC: 166 MG/DL
EGFR: 47 ML/MIN/1.73M2
EGFR: 54 ML/MIN/1.73M2
EOSINOPHIL # BLD AUTO: 0.6 K/UL
EOSINOPHIL NFR BLD AUTO: 5.5 %
EPITHELIAL CELLS: 0 /HPF
EPITHELIAL CELLS: 0 /HPF
ESTIMATED AVERAGE GLUCOSE: 143 MG/DL
FERRITIN SERPL-MCNC: 26 NG/ML
GLUCOSE QUALITATIVE U: >=1000 MG/DL
GLUCOSE QUALITATIVE U: NEGATIVE MG/DL
GLUCOSE SERPL-MCNC: 101 MG/DL
GLUCOSE SERPL-MCNC: 163 MG/DL
HBA1C MFR BLD HPLC: 6.6 %
HCT VFR BLD CALC: 40.9 %
HDLC SERPL-MCNC: 64 MG/DL
HGB BLD-MCNC: 13.2 G/DL
IMM GRANULOCYTES NFR BLD AUTO: 0.2 %
IRON SATN MFR SERPL: 15 %
IRON SERPL-MCNC: 67 UG/DL
KETONES URINE: 15 MG/DL
KETONES URINE: NEGATIVE MG/DL
LDLC SERPL CALC-MCNC: 83 MG/DL
LDLC SERPL DIRECT ASSAY-MCNC: 94 MG/DL
LEUKOCYTE ESTERASE URINE: ABNORMAL
LEUKOCYTE ESTERASE URINE: NEGATIVE
LYMPHOCYTES # BLD AUTO: 2.65 K/UL
LYMPHOCYTES NFR BLD AUTO: 24.4 %
MAN DIFF?: NORMAL
MCHC RBC-ENTMCNC: 28.4 PG
MCHC RBC-ENTMCNC: 32.3 GM/DL
MCV RBC AUTO: 88.1 FL
MICROALBUMIN 24H UR DL<=1MG/L-MCNC: <1.2 MG/DL
MICROALBUMIN/CREAT 24H UR-RTO: NORMAL MG/G
MICROSCOPIC-UA: NORMAL
MICROSCOPIC-UA: NORMAL
MONOCYTES # BLD AUTO: 0.8 K/UL
MONOCYTES NFR BLD AUTO: 7.4 %
NEUTROPHILS # BLD AUTO: 6.73 K/UL
NEUTROPHILS NFR BLD AUTO: 61.9 %
NITRITE URINE: NEGATIVE
NITRITE URINE: NEGATIVE
NONHDLC SERPL-MCNC: 109 MG/DL
PH URINE: 5.5
PH URINE: 6
PLATELET # BLD AUTO: 411 K/UL
POTASSIUM SERPL-SCNC: 4.6 MMOL/L
POTASSIUM SERPL-SCNC: 4.9 MMOL/L
POTASSIUM SERPL-SCNC: 5.5 MMOL/L
PROT SERPL-MCNC: 7.7 G/DL
PROTEIN URINE: NORMAL MG/DL
PROTEIN URINE: NORMAL MG/DL
PSA SERPL-MCNC: 3.62 NG/ML
RBC # BLD: 4.64 M/UL
RBC # FLD: 13 %
RED BLOOD CELLS URINE: 0 /HPF
RED BLOOD CELLS URINE: 0 /HPF
SODIUM SERPL-SCNC: 139 MMOL/L
SODIUM SERPL-SCNC: 140 MMOL/L
SPECIFIC GRAVITY URINE: 1.02
SPECIFIC GRAVITY URINE: >1.03
T4 FREE SERPL-MCNC: 1.2 NG/DL
TIBC SERPL-MCNC: 436 UG/DL
TRANSFERRIN SERPL-MCNC: 335 MG/DL
TRIGL SERPL-MCNC: 157 MG/DL
TSH SERPL-ACNC: 3 UIU/ML
UIBC SERPL-MCNC: 369 UG/DL
UROBILINOGEN URINE: 0.2 MG/DL
UROBILINOGEN URINE: 0.2 MG/DL
WBC # FLD AUTO: 10.87 K/UL
WHITE BLOOD CELLS URINE: 0 /HPF
WHITE BLOOD CELLS URINE: 3 /HPF

## 2024-09-24 ENCOUNTER — APPOINTMENT (OUTPATIENT)
Dept: INTERNAL MEDICINE | Facility: CLINIC | Age: 74
End: 2024-09-24

## 2024-09-24 NOTE — HEALTH RISK ASSESSMENT
[0] : 2) Feeling down, depressed, or hopeless: Not at all (0) [PHQ-2 Negative - No further assessment needed] : PHQ-2 Negative - No further assessment needed [Never] : Never [[id number]] : 0

## 2024-09-24 NOTE — ADDENDUM
[FreeTextEntry1] : This note was written by Sheyla Gutierrez on 09/24/2024 acting as medical scribe for Dr. Mis Massey. I, Dr. Mis Massey, have read and attest that all the information, medical decision making and discharge instructions within are true and accurate.

## 2024-09-24 NOTE — HISTORY OF PRESENT ILLNESS
[FreeTextEntry1] : f/u  [de-identified] : This is a 74 y/o male with a pmhx of HTN HLD DM CAD here today for a follow up.   Denies chest pain, SOB, LOZANO, dizziness, diaphoresis, palpitations, LE swelling, orthopnea, syncope, n/v, headache.

## 2024-09-26 ENCOUNTER — NON-APPOINTMENT (OUTPATIENT)
Age: 74
End: 2024-09-26

## 2024-09-26 ENCOUNTER — APPOINTMENT (OUTPATIENT)
Dept: INTERNAL MEDICINE | Facility: CLINIC | Age: 74
End: 2024-09-26
Payer: MEDICARE

## 2024-09-26 VITALS
SYSTOLIC BLOOD PRESSURE: 160 MMHG | BODY MASS INDEX: 23.9 KG/M2 | OXYGEN SATURATION: 99 % | DIASTOLIC BLOOD PRESSURE: 80 MMHG | TEMPERATURE: 98.6 F | HEART RATE: 98 BPM | WEIGHT: 140 LBS | HEIGHT: 64 IN

## 2024-09-26 VITALS — DIASTOLIC BLOOD PRESSURE: 70 MMHG | SYSTOLIC BLOOD PRESSURE: 150 MMHG

## 2024-09-26 LAB
ANION GAP SERPL CALC-SCNC: 13 MMOL/L
BASOPHILS # BLD AUTO: 0.05 K/UL
BASOPHILS NFR BLD AUTO: 0.5 %
BUN SERPL-MCNC: 17 MG/DL
CALCIUM SERPL-MCNC: 9.4 MG/DL
CHLORIDE SERPL-SCNC: 99 MMOL/L
CO2 SERPL-SCNC: 25 MMOL/L
CREAT SERPL-MCNC: 1.38 MG/DL
EGFR: 53 ML/MIN/1.73M2
EOSINOPHIL # BLD AUTO: 0.58 K/UL
EOSINOPHIL NFR BLD AUTO: 6.2 %
GLUCOSE SERPL-MCNC: 150 MG/DL
HCT VFR BLD CALC: 39.3 %
HGB BLD-MCNC: 12.6 G/DL
IMM GRANULOCYTES NFR BLD AUTO: 0.2 %
LYMPHOCYTES # BLD AUTO: 2.41 K/UL
LYMPHOCYTES NFR BLD AUTO: 25.6 %
MAN DIFF?: NORMAL
MCHC RBC-ENTMCNC: 28.7 PG
MCHC RBC-ENTMCNC: 32.1 GM/DL
MCV RBC AUTO: 89.5 FL
MONOCYTES # BLD AUTO: 0.84 K/UL
MONOCYTES NFR BLD AUTO: 8.9 %
NEUTROPHILS # BLD AUTO: 5.51 K/UL
NEUTROPHILS NFR BLD AUTO: 58.6 %
PLATELET # BLD AUTO: 412 K/UL
POTASSIUM SERPL-SCNC: 5.2 MMOL/L
RBC # BLD: 4.39 M/UL
RBC # FLD: 12.7 %
SODIUM SERPL-SCNC: 137 MMOL/L
WBC # FLD AUTO: 9.41 K/UL

## 2024-09-26 PROCEDURE — 93000 ELECTROCARDIOGRAM COMPLETE: CPT

## 2024-09-26 PROCEDURE — 99214 OFFICE O/P EST MOD 30 MIN: CPT | Mod: 25

## 2024-09-29 PROBLEM — R07.89 CHEST DISCOMFORT: Status: ACTIVE | Noted: 2024-09-16

## 2024-09-29 NOTE — PHYSICAL EXAM
[No Acute Distress] : no acute distress [Well Nourished] : well nourished [Well Developed] : well developed [Well-Appearing] : well-appearing [Normal Sclera/Conjunctiva] : normal sclera/conjunctiva [PERRL] : pupils equal round and reactive to light [EOMI] : extraocular movements intact [Normal Outer Ear/Nose] : the outer ears and nose were normal in appearance [Normal Oropharynx] : the oropharynx was normal [No JVD] : no jugular venous distention [No Lymphadenopathy] : no lymphadenopathy [Supple] : supple [Thyroid Normal, No Nodules] : the thyroid was normal and there were no nodules present [No Respiratory Distress] : no respiratory distress  [No Accessory Muscle Use] : no accessory muscle use [Clear to Auscultation] : lungs were clear to auscultation bilaterally [Normal Rate] : normal rate  [Regular Rhythm] : with a regular rhythm [Normal S1, S2] : normal S1 and S2 [No Murmur] : no murmur heard [No Carotid Bruits] : no carotid bruits [No Varicosities] : no varicosities [Pedal Pulses Present] : the pedal pulses are present [No Edema] : there was no peripheral edema [No Extremity Clubbing/Cyanosis] : no extremity clubbing/cyanosis [Soft] : abdomen soft [Non Tender] : non-tender [Non-distended] : non-distended [No Masses] : no abdominal mass palpated [No HSM] : no HSM [Normal Bowel Sounds] : normal bowel sounds [Normal Posterior Cervical Nodes] : no posterior cervical lymphadenopathy [Normal Anterior Cervical Nodes] : no anterior cervical lymphadenopathy [No CVA Tenderness] : no CVA  tenderness [No Spinal Tenderness] : no spinal tenderness [No Joint Swelling] : no joint swelling [Grossly Normal Strength/Tone] : grossly normal strength/tone [No Rash] : no rash [Coordination Grossly Intact] : coordination grossly intact [No Focal Deficits] : no focal deficits [Normal Gait] : normal gait [Normal Affect] : the affect was normal [Normal Insight/Judgement] : insight and judgment were intact [Alert and Oriented x3] : oriented to person, place, and time [de-identified] : R RP 2+

## 2024-09-29 NOTE — HISTORY OF PRESENT ILLNESS
[Post-hospitalization from ___ Hospital] : Post-hospitalization from [unfilled] Hospital [FreeTextEntry2] : This is on a 74 y/o with a pmhx of HTN, HLD, DM, CAD present here today for post-discharge. On 9/17 cardiac cath showed severe diagonal stenosis s/p successful balloon angioplasty and ZHOU X1, pt was place on aspirin and Plavix. Pt feels much then better then prior to stent, not having LOZANO/cp.. Not getting exertional chest pain as he was prior. Overall, pt feels well. Denies chest pain, SOB, dizziness, diaphoresis, palpitations, LE swelling, orthopnea, syncope, n/v, headache.

## 2024-09-29 NOTE — PHYSICAL EXAM
[No Acute Distress] : no acute distress [Well Nourished] : well nourished [Well Developed] : well developed [Well-Appearing] : well-appearing [Normal Sclera/Conjunctiva] : normal sclera/conjunctiva [PERRL] : pupils equal round and reactive to light [EOMI] : extraocular movements intact [Normal Outer Ear/Nose] : the outer ears and nose were normal in appearance [Normal Oropharynx] : the oropharynx was normal [No JVD] : no jugular venous distention [No Lymphadenopathy] : no lymphadenopathy [Supple] : supple [Thyroid Normal, No Nodules] : the thyroid was normal and there were no nodules present [No Respiratory Distress] : no respiratory distress  [No Accessory Muscle Use] : no accessory muscle use [Clear to Auscultation] : lungs were clear to auscultation bilaterally [Normal Rate] : normal rate  [Regular Rhythm] : with a regular rhythm [Normal S1, S2] : normal S1 and S2 [No Murmur] : no murmur heard [No Carotid Bruits] : no carotid bruits [No Varicosities] : no varicosities [Pedal Pulses Present] : the pedal pulses are present [No Edema] : there was no peripheral edema [No Extremity Clubbing/Cyanosis] : no extremity clubbing/cyanosis [Soft] : abdomen soft [Non Tender] : non-tender [Non-distended] : non-distended [No Masses] : no abdominal mass palpated [No HSM] : no HSM [Normal Bowel Sounds] : normal bowel sounds [Normal Posterior Cervical Nodes] : no posterior cervical lymphadenopathy [Normal Anterior Cervical Nodes] : no anterior cervical lymphadenopathy [No CVA Tenderness] : no CVA  tenderness [No Spinal Tenderness] : no spinal tenderness [No Joint Swelling] : no joint swelling [Grossly Normal Strength/Tone] : grossly normal strength/tone [No Rash] : no rash [Coordination Grossly Intact] : coordination grossly intact [No Focal Deficits] : no focal deficits [Normal Gait] : normal gait [Normal Affect] : the affect was normal [Normal Insight/Judgement] : insight and judgment were intact [Alert and Oriented x3] : oriented to person, place, and time [de-identified] : R RP 2+

## 2024-09-29 NOTE — HEALTH RISK ASSESSMENT
[0] : 2) Feeling down, depressed, or hopeless: Not at all (0) [PHQ-2 Negative - No further assessment needed] : PHQ-2 Negative - No further assessment needed [Never] : Never [[id number]] : 0

## 2024-09-29 NOTE — HISTORY OF PRESENT ILLNESS
[Post-hospitalization from ___ Hospital] : Post-hospitalization from [unfilled] Hospital [FreeTextEntry2] : This is on a 72 y/o with a pmhx of HTN, HLD, DM, CAD present here today for post-discharge. On 9/17 cardiac cath showed severe diagonal stenosis s/p successful balloon angioplasty and ZHOU X1, pt was place on aspirin and Plavix. Pt feels much then better then prior to stent, not having LOZANO/cp.. Not getting exertional chest pain as he was prior. Overall, pt feels well. Denies chest pain, SOB, dizziness, diaphoresis, palpitations, LE swelling, orthopnea, syncope, n/v, headache.

## 2024-09-29 NOTE — PHYSICAL EXAM
[No Acute Distress] : no acute distress [Well Nourished] : well nourished [Well Developed] : well developed [Well-Appearing] : well-appearing [Normal Sclera/Conjunctiva] : normal sclera/conjunctiva [PERRL] : pupils equal round and reactive to light [EOMI] : extraocular movements intact [Normal Outer Ear/Nose] : the outer ears and nose were normal in appearance [Normal Oropharynx] : the oropharynx was normal [No JVD] : no jugular venous distention [No Lymphadenopathy] : no lymphadenopathy [Supple] : supple [Thyroid Normal, No Nodules] : the thyroid was normal and there were no nodules present [No Respiratory Distress] : no respiratory distress  [No Accessory Muscle Use] : no accessory muscle use [Clear to Auscultation] : lungs were clear to auscultation bilaterally [Normal Rate] : normal rate  [Regular Rhythm] : with a regular rhythm [Normal S1, S2] : normal S1 and S2 [No Murmur] : no murmur heard [No Carotid Bruits] : no carotid bruits [No Varicosities] : no varicosities [Pedal Pulses Present] : the pedal pulses are present [No Edema] : there was no peripheral edema [No Extremity Clubbing/Cyanosis] : no extremity clubbing/cyanosis [Soft] : abdomen soft [Non Tender] : non-tender [Non-distended] : non-distended [No Masses] : no abdominal mass palpated [No HSM] : no HSM [Normal Bowel Sounds] : normal bowel sounds [Normal Posterior Cervical Nodes] : no posterior cervical lymphadenopathy [Normal Anterior Cervical Nodes] : no anterior cervical lymphadenopathy [No CVA Tenderness] : no CVA  tenderness [No Spinal Tenderness] : no spinal tenderness [No Joint Swelling] : no joint swelling [Grossly Normal Strength/Tone] : grossly normal strength/tone [No Rash] : no rash [Coordination Grossly Intact] : coordination grossly intact [No Focal Deficits] : no focal deficits [Normal Gait] : normal gait [Normal Affect] : the affect was normal [Normal Insight/Judgement] : insight and judgment were intact [Alert and Oriented x3] : oriented to person, place, and time [de-identified] : R RP 2+

## 2024-09-29 NOTE — ADDENDUM
[FreeTextEntry1] : This note was written by Sheyla Gutierrez on 09/26/2024 acting as medical scribe for Dr. Mis Massey. I, Dr. Mis Massey, have read and attest that all the information, medical decision making and discharge instructions within are true and accurate.

## 2024-09-29 NOTE — HEALTH RISK ASSESSMENT
[0] : 2) Feeling down, depressed, or hopeless: Not at all (0) [PHQ-2 Negative - No further assessment needed] : PHQ-2 Negative - No further assessment needed [Never] : Never [[id number]] : 0

## 2024-10-01 ENCOUNTER — APPOINTMENT (OUTPATIENT)
Dept: CARDIOLOGY | Facility: CLINIC | Age: 74
End: 2024-10-01
Payer: MEDICARE

## 2024-10-01 VITALS
SYSTOLIC BLOOD PRESSURE: 156 MMHG | BODY MASS INDEX: 24.41 KG/M2 | DIASTOLIC BLOOD PRESSURE: 70 MMHG | WEIGHT: 143 LBS | TEMPERATURE: 98 F | HEART RATE: 92 BPM | OXYGEN SATURATION: 98 % | HEIGHT: 64 IN

## 2024-10-01 DIAGNOSIS — E78.5 HYPERLIPIDEMIA, UNSPECIFIED: ICD-10-CM

## 2024-10-01 DIAGNOSIS — I65.23 OCCLUSION AND STENOSIS OF BILATERAL CAROTID ARTERIES: ICD-10-CM

## 2024-10-01 DIAGNOSIS — Z71.85 ENCOUNTER FOR IMMUNIZATION SAFETY COUNSELING: ICD-10-CM

## 2024-10-01 DIAGNOSIS — D64.9 ANEMIA, UNSPECIFIED: ICD-10-CM

## 2024-10-01 DIAGNOSIS — I10 ESSENTIAL (PRIMARY) HYPERTENSION: ICD-10-CM

## 2024-10-01 DIAGNOSIS — E11.9 TYPE 2 DIABETES MELLITUS W/OUT COMPLICATIONS: ICD-10-CM

## 2024-10-01 DIAGNOSIS — Z12.11 ENCOUNTER FOR SCREENING FOR MALIGNANT NEOPLASM OF COLON: ICD-10-CM

## 2024-10-01 DIAGNOSIS — I25.10 ATHEROSCLEROTIC HEART DISEASE OF NATIVE CORONARY ARTERY W/OUT ANGINA PECTORIS: ICD-10-CM

## 2024-10-01 DIAGNOSIS — R79.89 OTHER SPECIFIED ABNORMAL FINDINGS OF BLOOD CHEMISTRY: ICD-10-CM

## 2024-10-01 DIAGNOSIS — R07.89 OTHER CHEST PAIN: ICD-10-CM

## 2024-10-01 PROCEDURE — 93000 ELECTROCARDIOGRAM COMPLETE: CPT

## 2024-10-01 PROCEDURE — G0008: CPT

## 2024-10-01 PROCEDURE — 99214 OFFICE O/P EST MOD 30 MIN: CPT | Mod: 25

## 2024-10-01 PROCEDURE — 90662 IIV NO PRSV INCREASED AG IM: CPT

## 2024-10-01 RX ORDER — AMLODIPINE BESYLATE 5 MG/1
5 TABLET ORAL
Qty: 90 | Refills: 1 | Status: ACTIVE | COMMUNITY
Start: 2024-10-01 | End: 1900-01-01

## 2024-10-01 RX ORDER — ZOSTER VACCINE RECOMBINANT, ADJUVANTED 50 MCG/0.5
50 KIT INTRAMUSCULAR
Qty: 2 | Refills: 0 | Status: ACTIVE | COMMUNITY
Start: 2024-10-01 | End: 1900-01-01

## 2024-10-01 NOTE — HISTORY OF PRESENT ILLNESS
[FreeTextEntry1] : This is a 74 y/o male with a pmhx of HTN, HLD, DM, CAD presents today for follow up.  Last seen 9/16/24 reporting chest discomfort.  Patient is now s/p cath 9/17/24 Severe Diagonal stenosis s/p successful balloon angioplasty and ZHOU x1. Patient denies chest pain, dyspnea, palpitations, dizziness, syncope, changes in bowel/bladder habits or appetite.

## 2024-11-06 NOTE — CHART NOTE - NSCHARTNOTEFT_GEN_A_CORE
Cardiac Rehab Post PCI :              *Education on benefits of Cardiac Rehab provided to patient: Yes         *Referral and Prescription Given for Cardiac Rehab : Yes         *Pt given list of locations & instructed to contact their insurance company to review list of participating providers: Yes         *Pt instructed to bring Cardiac Rehab prescription with them to Cardiology Follow up appointment for assistance with enrollment: Yes         *Pt discharged with copies detail cardiovascular history, medications, testing/treatments: Yes      Merline Taylor NP  [phone]
Detail Level: Detailed
General Sunscreen Counseling: I recommended a broad spectrum sunscreen with a SPF of 30 or higher.  I explained that SPF 30 sunscreens block approximately 97 percent of the sun's harmful rays.  Sunscreens should be applied at least 15 minutes prior to expected sun exposure and then every 2 hours after that as long as sun exposure continues. If swimming or exercising sunscreen should be reapplied every 45 minutes to an hour after getting wet or sweating.  One ounce, or the equivalent of a shot glass full of sunscreen, is adequate to protect the skin not covered by a bathing suit. I also recommended a lip balm with a sunscreen as well. Sun protective clothing can be used in lieu of sunscreen but must be worn the entire time you are exposed to the sun's rays.

## 2024-11-20 ENCOUNTER — NON-APPOINTMENT (OUTPATIENT)
Age: 74
End: 2024-11-20

## 2024-11-27 ENCOUNTER — APPOINTMENT (OUTPATIENT)
Dept: CARDIOLOGY | Facility: CLINIC | Age: 74
End: 2024-11-27
Payer: MEDICARE

## 2024-11-27 VITALS
SYSTOLIC BLOOD PRESSURE: 150 MMHG | HEART RATE: 91 BPM | HEIGHT: 64 IN | TEMPERATURE: 98 F | OXYGEN SATURATION: 99 % | WEIGHT: 139 LBS | DIASTOLIC BLOOD PRESSURE: 80 MMHG | BODY MASS INDEX: 23.73 KG/M2

## 2024-11-27 DIAGNOSIS — I25.10 ATHEROSCLEROTIC HEART DISEASE OF NATIVE CORONARY ARTERY W/OUT ANGINA PECTORIS: ICD-10-CM

## 2024-11-27 DIAGNOSIS — D64.9 ANEMIA, UNSPECIFIED: ICD-10-CM

## 2024-11-27 DIAGNOSIS — R79.89 OTHER SPECIFIED ABNORMAL FINDINGS OF BLOOD CHEMISTRY: ICD-10-CM

## 2024-11-27 DIAGNOSIS — E78.5 HYPERLIPIDEMIA, UNSPECIFIED: ICD-10-CM

## 2024-11-27 DIAGNOSIS — E11.9 TYPE 2 DIABETES MELLITUS W/OUT COMPLICATIONS: ICD-10-CM

## 2024-11-27 DIAGNOSIS — I65.23 OCCLUSION AND STENOSIS OF BILATERAL CAROTID ARTERIES: ICD-10-CM

## 2024-11-27 DIAGNOSIS — R06.09 OTHER FORMS OF DYSPNEA: ICD-10-CM

## 2024-11-27 DIAGNOSIS — I10 ESSENTIAL (PRIMARY) HYPERTENSION: ICD-10-CM

## 2024-11-27 DIAGNOSIS — R07.89 OTHER CHEST PAIN: ICD-10-CM

## 2024-11-27 PROCEDURE — 99214 OFFICE O/P EST MOD 30 MIN: CPT | Mod: 25

## 2024-11-27 PROCEDURE — 93000 ELECTROCARDIOGRAM COMPLETE: CPT

## 2024-11-27 RX ORDER — ISOSORBIDE MONONITRATE 30 MG/1
30 TABLET, EXTENDED RELEASE ORAL DAILY
Qty: 30 | Refills: 1 | Status: ACTIVE | COMMUNITY
Start: 2024-11-27 | End: 1900-01-01

## 2024-11-30 LAB
ANION GAP SERPL CALC-SCNC: 13 MMOL/L
BASOPHILS # BLD AUTO: 0.05 K/UL
BASOPHILS NFR BLD AUTO: 0.5 %
BUN SERPL-MCNC: 24 MG/DL
CALCIUM SERPL-MCNC: 10.1 MG/DL
CHLORIDE SERPL-SCNC: 104 MMOL/L
CO2 SERPL-SCNC: 23 MMOL/L
CREAT SERPL-MCNC: 1.4 MG/DL
EGFR: 52 ML/MIN/1.73M2
EOSINOPHIL # BLD AUTO: 0.44 K/UL
EOSINOPHIL NFR BLD AUTO: 4.2 %
FERRITIN SERPL-MCNC: 16 NG/ML
FOLATE SERPL-MCNC: 18.9 NG/ML
GLUCOSE SERPL-MCNC: 113 MG/DL
HCT VFR BLD CALC: 42.1 %
HGB BLD-MCNC: 12.9 G/DL
IMM GRANULOCYTES NFR BLD AUTO: 0.1 %
IRON SATN MFR SERPL: 8 %
IRON SERPL-MCNC: 38 UG/DL
LYMPHOCYTES # BLD AUTO: 1.99 K/UL
LYMPHOCYTES NFR BLD AUTO: 19.2 %
MAN DIFF?: NORMAL
MCHC RBC-ENTMCNC: 27.6 PG
MCHC RBC-ENTMCNC: 30.6 G/DL
MCV RBC AUTO: 90.1 FL
MONOCYTES # BLD AUTO: 0.88 K/UL
MONOCYTES NFR BLD AUTO: 8.5 %
NEUTROPHILS # BLD AUTO: 7.02 K/UL
NEUTROPHILS NFR BLD AUTO: 67.5 %
PLATELET # BLD AUTO: 497 K/UL
POTASSIUM SERPL-SCNC: 5.4 MMOL/L
RBC # BLD: 4.67 M/UL
RBC # FLD: 12.5 %
SODIUM SERPL-SCNC: 139 MMOL/L
TIBC SERPL-MCNC: 467 UG/DL
TRANSFERRIN SERPL-MCNC: 369 MG/DL
UIBC SERPL-MCNC: 430 UG/DL
VIT B12 SERPL-MCNC: 610 PG/ML
WBC # FLD AUTO: 10.39 K/UL

## 2024-12-02 ENCOUNTER — APPOINTMENT (OUTPATIENT)
Dept: CARDIOLOGY | Facility: CLINIC | Age: 74
End: 2024-12-02
Payer: MEDICARE

## 2024-12-02 PROCEDURE — A9500: CPT

## 2024-12-02 PROCEDURE — 78452 HT MUSCLE IMAGE SPECT MULT: CPT

## 2024-12-02 PROCEDURE — 93015 CV STRESS TEST SUPVJ I&R: CPT

## 2024-12-02 RX ORDER — REGADENOSON 0.08 MG/ML
0.4 INJECTION, SOLUTION INTRAVENOUS
Qty: 1 | Refills: 0 | Status: COMPLETED | OUTPATIENT
Start: 2024-12-02

## 2024-12-02 RX ADMIN — REGADENOSON 4 MG/5ML: 0.08 INJECTION, SOLUTION INTRAVENOUS at 00:00

## 2024-12-03 ENCOUNTER — NON-APPOINTMENT (OUTPATIENT)
Age: 74
End: 2024-12-03

## 2024-12-05 LAB
ANION GAP SERPL CALC-SCNC: 16 MMOL/L
BUN SERPL-MCNC: 31 MG/DL
CALCIUM SERPL-MCNC: 9.5 MG/DL
CHLORIDE SERPL-SCNC: 99 MMOL/L
CO2 SERPL-SCNC: 21 MMOL/L
CREAT SERPL-MCNC: 1.69 MG/DL
EGFR: 42 ML/MIN/1.73M2
GLUCOSE SERPL-MCNC: 197 MG/DL
POTASSIUM SERPL-SCNC: 5 MMOL/L
SODIUM SERPL-SCNC: 136 MMOL/L

## 2024-12-07 ENCOUNTER — RX RENEWAL (OUTPATIENT)
Age: 74
End: 2024-12-07

## 2024-12-09 ENCOUNTER — NON-APPOINTMENT (OUTPATIENT)
Age: 74
End: 2024-12-09

## 2024-12-10 ENCOUNTER — OUTPATIENT (OUTPATIENT)
Dept: OUTPATIENT SERVICES | Facility: HOSPITAL | Age: 74
LOS: 1 days | End: 2024-12-10
Payer: MEDICARE

## 2024-12-10 ENCOUNTER — TRANSCRIPTION ENCOUNTER (OUTPATIENT)
Age: 74
End: 2024-12-10

## 2024-12-10 VITALS
HEART RATE: 89 BPM | SYSTOLIC BLOOD PRESSURE: 143 MMHG | OXYGEN SATURATION: 100 % | RESPIRATION RATE: 14 BRPM | HEIGHT: 63 IN | DIASTOLIC BLOOD PRESSURE: 72 MMHG | TEMPERATURE: 98 F | WEIGHT: 139.99 LBS

## 2024-12-10 VITALS — HEART RATE: 95 BPM | TEMPERATURE: 98 F

## 2024-12-10 DIAGNOSIS — I25.10 ATHEROSCLEROTIC HEART DISEASE OF NATIVE CORONARY ARTERY WITHOUT ANGINA PECTORIS: ICD-10-CM

## 2024-12-10 DIAGNOSIS — Z95.5 PRESENCE OF CORONARY ANGIOPLASTY IMPLANT AND GRAFT: Chronic | ICD-10-CM

## 2024-12-10 DIAGNOSIS — M48.8X2 OTHER SPECIFIED SPONDYLOPATHIES, CERVICAL REGION: Chronic | ICD-10-CM

## 2024-12-10 LAB
ALBUMIN SERPL ELPH-MCNC: 4.6 G/DL — SIGNIFICANT CHANGE UP (ref 3.3–5)
ALP SERPL-CCNC: 70 U/L — SIGNIFICANT CHANGE UP (ref 40–120)
ALT FLD-CCNC: 15 U/L — SIGNIFICANT CHANGE UP (ref 10–45)
ANION GAP SERPL CALC-SCNC: 13 MMOL/L — SIGNIFICANT CHANGE UP (ref 5–17)
AST SERPL-CCNC: 15 U/L — SIGNIFICANT CHANGE UP (ref 10–40)
BILIRUB SERPL-MCNC: 0.9 MG/DL — SIGNIFICANT CHANGE UP (ref 0.2–1.2)
BUN SERPL-MCNC: 24 MG/DL — HIGH (ref 7–23)
CALCIUM SERPL-MCNC: 9.6 MG/DL — SIGNIFICANT CHANGE UP (ref 8.4–10.5)
CHLORIDE SERPL-SCNC: 103 MMOL/L — SIGNIFICANT CHANGE UP (ref 96–108)
CO2 SERPL-SCNC: 21 MMOL/L — LOW (ref 22–31)
CREAT SERPL-MCNC: 1.42 MG/DL — HIGH (ref 0.5–1.3)
EGFR: 51 ML/MIN/1.73M2 — LOW
GLUCOSE BLDC GLUCOMTR-MCNC: 113 MG/DL — HIGH (ref 70–99)
GLUCOSE BLDC GLUCOMTR-MCNC: 167 MG/DL — HIGH (ref 70–99)
GLUCOSE BLDC GLUCOMTR-MCNC: 187 MG/DL — HIGH (ref 70–99)
GLUCOSE SERPL-MCNC: 161 MG/DL — HIGH (ref 70–99)
HCT VFR BLD CALC: 40.2 % — SIGNIFICANT CHANGE UP (ref 39–50)
HGB BLD-MCNC: 12.5 G/DL — LOW (ref 13–17)
MAGNESIUM SERPL-MCNC: 2.3 MG/DL — SIGNIFICANT CHANGE UP (ref 1.6–2.6)
MCHC RBC-ENTMCNC: 27.4 PG — SIGNIFICANT CHANGE UP (ref 27–34)
MCHC RBC-ENTMCNC: 31.1 G/DL — LOW (ref 32–36)
MCV RBC AUTO: 88.2 FL — SIGNIFICANT CHANGE UP (ref 80–100)
NRBC # BLD: 0 /100 WBCS — SIGNIFICANT CHANGE UP (ref 0–0)
PLATELET # BLD AUTO: 447 K/UL — HIGH (ref 150–400)
POTASSIUM SERPL-MCNC: 5.6 MMOL/L — HIGH (ref 3.5–5.3)
POTASSIUM SERPL-SCNC: 5.6 MMOL/L — HIGH (ref 3.5–5.3)
PROT SERPL-MCNC: 7.5 G/DL — SIGNIFICANT CHANGE UP (ref 6–8.3)
RBC # BLD: 4.56 M/UL — SIGNIFICANT CHANGE UP (ref 4.2–5.8)
RBC # FLD: 12.3 % — SIGNIFICANT CHANGE UP (ref 10.3–14.5)
SODIUM SERPL-SCNC: 137 MMOL/L — SIGNIFICANT CHANGE UP (ref 135–145)
WBC # BLD: 8.73 K/UL — SIGNIFICANT CHANGE UP (ref 3.8–10.5)
WBC # FLD AUTO: 8.73 K/UL — SIGNIFICANT CHANGE UP (ref 3.8–10.5)

## 2024-12-10 PROCEDURE — 83735 ASSAY OF MAGNESIUM: CPT

## 2024-12-10 PROCEDURE — C1894: CPT

## 2024-12-10 PROCEDURE — C1725: CPT

## 2024-12-10 PROCEDURE — 99152 MOD SED SAME PHYS/QHP 5/>YRS: CPT

## 2024-12-10 PROCEDURE — 85027 COMPLETE CBC AUTOMATED: CPT

## 2024-12-10 PROCEDURE — C1874: CPT

## 2024-12-10 PROCEDURE — 82962 GLUCOSE BLOOD TEST: CPT

## 2024-12-10 PROCEDURE — 93010 ELECTROCARDIOGRAM REPORT: CPT | Mod: 76

## 2024-12-10 PROCEDURE — 93454 CORONARY ARTERY ANGIO S&I: CPT | Mod: 59

## 2024-12-10 PROCEDURE — 80053 COMPREHEN METABOLIC PANEL: CPT

## 2024-12-10 PROCEDURE — C9600: CPT | Mod: LC

## 2024-12-10 PROCEDURE — C1769: CPT

## 2024-12-10 PROCEDURE — 92928 PRQ TCAT PLMT NTRAC ST 1 LES: CPT | Mod: LC

## 2024-12-10 PROCEDURE — C1887: CPT

## 2024-12-10 PROCEDURE — 93005 ELECTROCARDIOGRAM TRACING: CPT

## 2024-12-10 PROCEDURE — 93454 CORONARY ARTERY ANGIO S&I: CPT | Mod: 26,59

## 2024-12-10 RX ORDER — GLUCAGON INJECTION, SOLUTION 0.5 MG/.1ML
1 INJECTION, SOLUTION SUBCUTANEOUS ONCE
Refills: 0 | Status: DISCONTINUED | OUTPATIENT
Start: 2024-12-10 | End: 2024-12-25

## 2024-12-10 RX ORDER — 0.9 % SODIUM CHLORIDE 0.9 %
1000 INTRAVENOUS SOLUTION INTRAVENOUS
Refills: 0 | Status: DISCONTINUED | OUTPATIENT
Start: 2024-12-10 | End: 2024-12-25

## 2024-12-10 RX ORDER — PRAVASTATIN SODIUM 20 MG/1
1 TABLET ORAL
Refills: 0 | DISCHARGE

## 2024-12-10 RX ORDER — EZETIMIBE 10 MG
1 TABLET ORAL
Refills: 0 | DISCHARGE

## 2024-12-10 RX ORDER — SODIUM CHLORIDE 9 MG/ML
1000 INJECTION, SOLUTION INTRAMUSCULAR; INTRAVENOUS; SUBCUTANEOUS
Refills: 0 | Status: DISCONTINUED | OUTPATIENT
Start: 2024-12-10 | End: 2024-12-25

## 2024-12-10 RX ORDER — SODIUM ZIRCONIUM CYCLOSILICATE 5 G/5G
5 POWDER, FOR SUSPENSION ORAL ONCE
Refills: 0 | Status: DISCONTINUED | OUTPATIENT
Start: 2024-12-10 | End: 2024-12-10

## 2024-12-10 RX ORDER — IRON ASPGLY,PS/C/SUCCINIC ACID 150-50-50
1 CAPSULE ORAL
Refills: 0 | DISCHARGE

## 2024-12-10 RX ORDER — CLOPIDOGREL 75 MG/1
1 TABLET, FILM COATED ORAL
Refills: 0 | DISCHARGE

## 2024-12-10 RX ORDER — SODIUM ZIRCONIUM CYCLOSILICATE 5 G/5G
10 POWDER, FOR SUSPENSION ORAL ONCE
Refills: 0 | Status: COMPLETED | OUTPATIENT
Start: 2024-12-10 | End: 2024-12-10

## 2024-12-10 RX ORDER — EMPAGLIFLOZIN 25 MG/1
1 TABLET, FILM COATED ORAL
Refills: 0 | DISCHARGE

## 2024-12-10 RX ORDER — SODIUM CHLORIDE 9 MG/ML
250 INJECTION, SOLUTION INTRAMUSCULAR; INTRAVENOUS; SUBCUTANEOUS ONCE
Refills: 0 | Status: COMPLETED | OUTPATIENT
Start: 2024-12-10 | End: 2024-12-10

## 2024-12-10 RX ORDER — ISOSORBIDE MONONITRATE 10 MG
1 TABLET ORAL
Qty: 0 | Refills: 0 | DISCHARGE
Start: 2024-12-10

## 2024-12-10 RX ORDER — LOSARTAN POTASSIUM 100 MG/1
100 TABLET, FILM COATED ORAL ONCE
Refills: 0 | Status: COMPLETED | OUTPATIENT
Start: 2024-12-10 | End: 2024-12-10

## 2024-12-10 RX ORDER — ISOSORBIDE MONONITRATE 10 MG
1 TABLET ORAL
Qty: 90 | Refills: 0
Start: 2024-12-10 | End: 2025-03-09

## 2024-12-10 RX ORDER — ISOSORBIDE 45 %
30 SOLUTION, ORAL ORAL
Refills: 0 | DISCHARGE

## 2024-12-10 RX ADMIN — LOSARTAN POTASSIUM 100 MILLIGRAM(S): 100 TABLET, FILM COATED ORAL at 21:49

## 2024-12-10 RX ADMIN — SODIUM CHLORIDE 75 MILLILITER(S): 9 INJECTION, SOLUTION INTRAMUSCULAR; INTRAVENOUS; SUBCUTANEOUS at 17:04

## 2024-12-10 RX ADMIN — SODIUM CHLORIDE 75 MILLILITER(S): 9 INJECTION, SOLUTION INTRAMUSCULAR; INTRAVENOUS; SUBCUTANEOUS at 14:24

## 2024-12-10 RX ADMIN — SODIUM CHLORIDE 750 MILLILITER(S): 9 INJECTION, SOLUTION INTRAMUSCULAR; INTRAVENOUS; SUBCUTANEOUS at 14:24

## 2024-12-10 RX ADMIN — SODIUM ZIRCONIUM CYCLOSILICATE 10 GRAM(S): 5 POWDER, FOR SUSPENSION ORAL at 14:58

## 2024-12-10 NOTE — H&P CARDIOLOGY - EKG AND INTERPRETATION
Detail Level: Detailed
Bill J-Code?: Yes
Size Of Lesion In Cm (Optional): 0
Medication Injected: 5-Fluorouracil
Concentration (Mg/Ml): 50.0
Total Volume (Ccs): 0.1
Administered By (Optional): LUIS
Render Post-Care Instructions In Note?: no
Post-Care Instructions: I reviewed with the patient in detail post-care instructions. Patient is to keep the site dry overnight, and then apply bacitracin twice daily until healed. Patient may apply hydrogen peroxide soaks to remove any crusting.
Consent: The risks of medication reaction, injection site pain and lesion necrosis were reviewed with the patient.
Bill As A Line Item Or As Units: Line Item
SR 92

## 2024-12-10 NOTE — ASU DISCHARGE PLAN (ADULT/PEDIATRIC) - NS MD DC FALL RISK RISK
For information on Fall & Injury Prevention, visit: [email or web address] OR  [email or web address] OR  https://www.cdc.gov/steadi/patient.html

## 2024-12-10 NOTE — ASU DISCHARGE PLAN (ADULT/PEDIATRIC) - FINANCIAL ASSISTANCE
[facility] provides services at a reduced cost to those who are determined to be eligible through [facility]’s financial assistance program. Information regarding [facility]’s financial assistance program can be found by going to [email or web address] or by calling [phone].

## 2024-12-10 NOTE — H&P CARDIOLOGY - NSICDXPASTMEDICALHX_GEN_ALL_CORE_FT
PAST MEDICAL HISTORY:  CAD (coronary artery disease)     Cervical cord compression with myelopathy     Diabetes mellitus     Dyslipidemia     HTN (hypertension)

## 2024-12-10 NOTE — ASU DISCHARGE PLAN (ADULT/PEDIATRIC) - CARE PROVIDER_API CALL
Jose Gibbons  Cardiology  [address]  [place] [address]  Phone: [phone]  Fax: [phone]  Follow Up Time:

## 2024-12-10 NOTE — ASU DISCHARGE PLAN (ADULT/PEDIATRIC) - CALL YOUR DOCTOR IF YOU HAVE ANY OF THE FOLLOWING:
Bleeding that does not stop/Swelling that gets worse/Pain not relieved by Medications/Fever greater than (need to indicate Fahrenheit or Celsius)/Wound/Surgical Site with redness, or foul smelling discharge or pus/Numbness, tingling, color or temperature change to extremity/Nausea and vomiting that does not stop
no

## 2024-12-10 NOTE — ASU DISCHARGE PLAN (ADULT/PEDIATRIC) - ASU DC SPECIAL INSTRUCTIONSFT
Wound Care:   the day AFTER your procedure remove bandage GENTLY, and clean using  mild soap and gentle warm, water stream, pat dry. leave OPEN to air. YOU MAY SHOWER   DO NOT apply lotions, creams, ointments, powder, perfumes to your incision site  DO NOT SOAK your site for 1 week ( no baths, no pools, no tubs, etc...)  Check  your groin and /or wrist daily.A small amount of bruising, and soarness are normal    ACTIVITY: for 24 hours   - DO NOT DRIVE  - DO NOT make any important decisions or sign legal documents   - DO NOT operate heavy machineries   - you may resume sexual activity in 48 hours, unless otherwise instructed by your cardiologist     If your procedure was done through the WRIST: for the NEXT 3DAYS:  - avoid pushing, pulling, with that affected wrist   - avoid repeated movement of that hand and wrist ( eg: typing, hammering)  - DO NOT LIFT anything more than 5 lbs     If your procedure was done through the GROIN: for the NEXT 5 DAYS  - Limit climbing stairs, DO NOT soak in bathtub or pool  - no strenuous activities, pushing, pulling, straining  - Do not lift anything 10lbs or heavier     MEDICATION:   take your medications as explained ( see discharge paperwork)   If you received a STENT, you will be taking antiplatelet medications to KEEP YOUR STENT OPEN ( eg: Aspirin, Plavix, Brilinta, Effient, etc).  Take as prescribed DO NOT STOP taking them without consulting with your cardiologist first.     Follow heart healthy diet recommended by your doctor, , if you smoke STOP SMOKING ( may call [phone] for center of tobacco control if you need assistance)     CALL your doctor to make appointment in 2 WEEKS     ***CALL YOUR DOCTOR***  if you experience: fever, chills, body aches, or severe pain, swelling, redness, heat or yellow discharge at incision site  If you experience Bleeding or excruciating pain at the procedural site, swelling ( golf ball size) at your procedural site  If you experience CHEST PAIN  If you experience extremity numbness, tingling, temperature change ( of your procedural site)   If you are unable to reach your doctor, you may contact:   -Cardiology Office at [facility] at [phone] or   - [facility] [phone]  - [facility] [phone] Wound Care:   the day AFTER your procedure remove bandage GENTLY, and clean using  mild soap and gentle warm, water stream, pat dry. leave OPEN to air. YOU MAY SHOWER   DO NOT apply lotions, creams, ointments, powder, perfumes to your incision site  DO NOT SOAK your site for 1 week ( no baths, no pools, no tubs, etc...)  Check  your groin and /or wrist daily.A small amount of bruising, and soarness are normal    ACTIVITY: for 24 hours   - DO NOT DRIVE  - DO NOT make any important decisions or sign legal documents   - DO NOT operate heavy machineries   - you may resume sexual activity in 48 hours, unless otherwise instructed by your cardiologist     If your procedure was done through the WRIST: for the NEXT 3DAYS:  - avoid pushing, pulling, with that affected wrist   - avoid repeated movement of that hand and wrist ( eg: typing, hammering)  - DO NOT LIFT anything more than 5 lbs     If your procedure was done through the GROIN: for the NEXT 5 DAYS  - Limit climbing stairs, DO NOT soak in bathtub or pool  - no strenuous activities, pushing, pulling, straining  - Do not lift anything 10lbs or heavier     MEDICATION:   take your medications as explained ( see discharge paperwork)   If you received a STENT, you will be taking antiplatelet medications to KEEP YOUR STENT OPEN ( eg: Aspirin, Plavix, Brilinta, Effient, etc).  Take as prescribed DO NOT STOP taking them without consulting with your cardiologist first.     Follow heart healthy diet recommended by your doctor, , if you smoke STOP SMOKING ( may call [phone] for center of tobacco control if you need assistance)     CALL your doctor to make appointment in 2 WEEKS     ***CALL YOUR DOCTOR***  if you experience: fever, chills, body aches, or severe pain, swelling, redness, heat or yellow discharge at incision site  If you experience Bleeding or excruciating pain at the procedural site, swelling ( golf ball size) at your procedural site  If you experience CHEST PAIN  If you experience extremity numbness, tingling, temperature change ( of your procedural site)   If you are unable to reach your doctor, you may contact:   -Cardiology Office at [facility] at [phone] or   - [facility] [phone]  - [facility] [phone]    Cardiac Rehab  )/Post PCI):              *Education on benefits of Cardiac Rehab provided to patient: Yes         *Referral and Prescription Given for Cardiac Rehab : Yes         *Pt given list of locations & instructed to contact their insurance company to review list of participating providers: Yes         *Pt instructed to bring Cardiac Rehab prescription with them to Cardiology Follow up appointment for assistance with enrollment: Yes         *Pt discharged with copies detail cardiovascular history, medications, testing/treatments: Yes      *

## 2024-12-10 NOTE — H&P CARDIOLOGY - HISTORY OF PRESENT ILLNESS
72 year old male with PMH DM (T2, Hgba1c 7.1, pt follows Endo Dr Saman Sheppard), HTN, HLD. CAD/90% pLCx and 90% OM1 stenosis s/p successful intervention with 1 ZHOU to pLCx and POBA of OM 1/2023, ZHOU diagonal 9/2024 who presented to cardiology, Dr. Gibbons, for evaluation of exertional chest burning when he walks a long distance, he reports a burning sensation in his chest area, he also reported some heavier breathing when he walks a faster pace for the past few months. He reports when he stops/relaxes, the burning sensation resolves. Denies chest pain/pressure, SOB/LOZANO, palpitations, dizziness, diaphoresis, nausea, vomiting, peripheral edema, recent weight gain, or syncope. No implanted monitoring devices. Pt. presents for further evaluation and cardiac cath.

## 2024-12-10 NOTE — H&P CARDIOLOGY - NSICDXPASTSURGICALHX_GEN_ALL_CORE_FT
PAST SURGICAL HISTORY:  History of coronary artery stent placement     Ossification of posterior longitudinal ligament in cervical region

## 2024-12-10 NOTE — ASU PATIENT PROFILE, ADULT - PRESSURE ULCER(S)
Patient: Odell Agee Date: 10/15/2024  : 1960 Attending: Vladimir Freeman MD  64 year old male         Subjective     10/15 - no cardiac complaints. However his HR does dip in the 30s could be related to octreotide use           ALLERGIES:  Patient has no known allergies.      Medications And Infusions     Scheduled:   Current Facility-Administered Medications   Medication Dose Route Frequency Provider Last Rate Last Admin    phytonadione (vitamin K1) (AQUA-MEPHYTON) 10 mg in sodium chloride 0.9 % 50 mL IVPB  10 mg Intravenous Daily Madhuri Mg  mL/hr at 10/15/24 1139 10 mg at 10/15/24 1139    insulin lispro (ADMELOG,HumaLOG) - Correction Dose   Subcutaneous 4 times per day Urs, Tran I, CNP   2 Units at 10/15/24 0618    midodrine (PROAMATINE) tablet 5 mg  5 mg Oral TID AC Jose Chaidez MD   5 mg at 10/15/24 0616    methylPREDNISolone (SOLU-Medrol) injection 20 mg  20 mg Intravenous 3 times per day Kendell Lockett DO   20 mg at 10/15/24 0618    cefTRIAXone (ROCEPHIN) syringe 2,000 mg  2,000 mg Intravenous Daily Kendell Lockett DO   2,000 mg at 10/15/24 0857    albumin human (SPA) 25 % injection 25 g  25 g Intravenous BID Nakia Tripathi  mL/hr at 10/15/24 0943 Restarted at 10/15/24 0943    folic acid (FOLATE) tablet 1 mg  1 mg Oral Daily Urs, Tran I, CNP   1 mg at 10/15/24 0857    thiamine (VITAMIN B1) tablet 100 mg  100 mg Oral Daily Urs, Tran I, CNP   100 mg at 10/15/24 0857    [Held by provider] furosemide (LASIX) tablet 40 mg  40 mg Oral Daily Urs, Tran I, CNP        [Held by provider] magnesium oxide (MAG-OX) tablet 400 mg  400 mg Oral Daily Urs, Tran I, CNP        pantoprazole (PROTONIX) EC tablet 40 mg  40 mg Oral BID AC Urs, Tran I, CNP   40 mg at 10/15/24 0616    [Held by provider] spironolactone (ALDACTONE) tablet 50 mg  50 mg Oral Daily Urs, Tran I, CNP            Infusions:  Current Facility-Administered Medications   Medication Dose Route Frequency  Provider Last Rate Last Admin    sodium chloride 0.9% infusion   Intravenous Continuous PRN Urs, Tran I, CNP        octreotide (SandoSTATIN) 1,000 mcg/100 mL in sodium chloride 0.9 % infusion  50 mcg/hr Intravenous Continuous Kendell Lockett DO 5 mL/hr at 10/15/24 0944 50 mcg/hr at 10/15/24 0944    sodium chloride 0.9% infusion   Intravenous Continuous Urs, Tran I, CNP 75 mL/hr at 10/15/24 0944 Restarted at 10/15/24 0944         Prior To Admission Medications  Medications Prior to Admission   Medication Sig Dispense Refill    spironolactone (ALDACTONE) 25 MG tablet TAKE TWO TABLETS BY MOUTH DAILY 60 tablet 0    pantoprazole (PROTONIX) 40 MG tablet Take 1 tablet by mouth in the morning and 1 tablet in the evening. Take before meals. 180 tablet 3    furosemide (LASIX) 40 MG tablet Take 1 tablet by mouth daily. 30 tablet 5    magnesium oxide (MAG-OX) 400 (240 Mg) MG tablet Take 1 tablet by mouth daily. Do not start before February 7, 2024. 30 tablet 0          Rhythm: Sinus Bradycardia      Physical Exam       Vitals Last Value 24 Hour Range  Temperature 97.7 °F (36.5 °C) Temp  Min: 97.3 °F (36.3 °C)  Max: 97.9 °F (36.6 °C)  Pulse 64 Pulse  Min: 56  Max: 82  Respiratory 16 Resp  Min: 15  Max: 18  Blood Pressure 114/68 BP  Min: 96/59  Max: 130/70  Arterial BP   No data recorded  Pulse Oximetry 99 % SpO2  Min: 94 %  Max: 99 %      Vitals Today Admission  Weight 96.8 kg (213 lb 6.5 oz) Weight: 96.8 kg (213 lb 6.5 oz)    Body mass index is 32.45 kg/m².    Weight over the past 48 Hours:  No data found.     Intake/Output:    I/O's    Intake/Output Summary (Last 24 hours) at 10/15/2024 1211  Last data filed at 10/15/2024 1134  Gross per 24 hour   Intake 1789.42 ml   Output 275 ml   Net 1514.42 ml       Physical Exam  Vitals and nursing note reviewed.   Constitutional:       General: He is not in acute distress.     Appearance: He is ill-appearing.   Cardiovascular:      Rate and Rhythm: Regular rhythm. Bradycardia  present.   Pulmonary:      Effort: Pulmonary effort is normal.      Breath sounds: Normal breath sounds.   Abdominal:      Palpations: Abdomen is soft.   Musculoskeletal:         General: Normal range of motion.      Cervical back: Neck supple.      Right lower leg: Edema present.      Left lower leg: Edema present.   Skin:     General: Skin is warm.      Coloration: Skin is jaundiced.   Neurological:      Mental Status: He is alert and oriented to person, place, and time.           Laboratory Data       Recent Labs   Lab 10/15/24  0513 10/14/24  1014 10/14/24  0458 10/13/24  1928 10/13/24  0811 10/13/24  0516 10/12/24  1719 10/12/24  1124   WBC 7.3  --  6.1  --  8.6  --   --  9.1   HCT 18.3*  --  20.3* 19.4* 18.5*  --   --  21.2*   HGB 6.5*  --  7.3* 7.0* 6.5*  --   --  7.6*   PLT 65*  --  67*  --  78*  --   --  118*   INR 2.1  --  2.1  --   --  3.5  --  2.9   PTT  --   --  31  --   --   --   --  36*   SODIUM 130*  --  125*  --   --  132*   < > 126*   POTASSIUM 3.5  --  3.5  --   --  3.4   < > 3.1*   CHLORIDE 95*  --  91*  --   --  95*   < > 86*   CO2 21  --  25  --   --  27   < > 19*   GLUCOSE 220*  --  262*  --   --  164*   < > 195*   BUN 47*  --  51*  --   --  47*   < > 44*   CREATININE 2.04*  --  2.08*  --   --  1.85*   < > 1.62*   CPK  --   --   --   --   --   --   --  138   TSH  --  1.180  --   --   --   --   --   --     < > = values in this interval not displayed.         Troponin:    Lab Results   Component Value Date    HTROPI 20 10/12/2024                   Recent Results (from the past 24 hour(s))   TRANSTHORACIC ECHO (TTE) COMPLETE W/ W/O IMAGING AGENT    Collection Time: 10/14/24 12:19 PM   Result Value Ref Range    AV Stenosis Severity Text Absent     Ejection Fraction 60%     AV VTI (Previously displayed as KEESHA) 0.92     MV Peak E Velocity 0.92     MV Peak A Velocity 169     E Wave Decelaration Time [id number]     MV E Wave Matt/E Tissue Matt Med 8.59     MV E Tissue Matt Med 13.4     Tricuspid Valve  Peak Regurgitation Velocity 3.5     TV Estimated Right Arterial Pressure 13.8     RV End Systolic Longitudinal Strain Free Wall 2     ABBIE LVOT Peak Gradient 1.3     LV end diastolic posterior wall thickness 2D 5.0     Left Ventricular Internal Dimension in Diastole 3.7     Left Internal Dimenson in Systole 1.1     Interventricular Septum in End Diastole 2.45    GLUCOSE, BEDSIDE - POINT OF CARE    Collection Time: 10/14/24  4:30 PM    Specimen: Blood   Result Value Ref Range    GLUCOSE, BEDSIDE - POINT OF CARE 192 (H) 70 - 99 mg/dL   GLUCOSE, BEDSIDE - POINT OF CARE    Collection Time: 10/15/24 12:01 AM    Specimen: Blood   Result Value Ref Range    GLUCOSE, BEDSIDE - POINT OF CARE 258 (H) 70 - 99 mg/dL   Comprehensive Metabolic Panel    Collection Time: 10/15/24  5:13 AM    Specimen: Blood, Venous   Result Value Ref Range    Fasting Status      Sodium 130 (L) 135 - 145 mmol/L    Potassium 3.5 3.4 - 5.1 mmol/L    Chloride 95 (L) 97 - 110 mmol/L    Carbon Dioxide 21 21 - 32 mmol/L    Anion Gap 18 7 - 19 mmol/L    Glucose 220 (H) 70 - 99 mg/dL    BUN 47 (H) 6 - 20 mg/dL    Creatinine 2.04 (H) 0.67 - 1.17 mg/dL    Glomerular Filtration Rate 35 (L) >=60    BUN/Cr 23 7 - 25    Calcium 7.8 (L) 8.4 - 10.2 mg/dL    Bilirubin, Total 9.0 (H) 0.2 - 1.0 mg/dL    GOT/AST 43 (H) <=37 Units/L    GPT/ALT 16 <64 Units/L    Alkaline Phosphatase 79 45 - 117 Units/L    Albumin 2.8 (L) 3.4 - 5.0 g/dL    Protein, Total 5.5 (L) 6.4 - 8.2 g/dL    Globulin 2.7 2.0 - 4.0 g/dL    A/G Ratio 1.0 1.0 - 2.4   Iron And total Iron Binding Capacity    Collection Time: 10/15/24  5:13 AM    Specimen: Blood, Venous   Result Value Ref Range    Iron 79 65 - 175 mcg/dL    Iron Binding Capacity 150 (L) 250 - 450 mcg/dL    Iron, Percent Saturation 53 (H) 15 - 45 %   Ferritin    Collection Time: 10/15/24  5:13 AM    Specimen: Blood, Venous   Result Value Ref Range    Ferritin 943 (H) 26 - 388 ng/mL   Magnesium    Collection Time: 10/15/24  5:13 AM     Specimen: Blood, Venous   Result Value Ref Range    Magnesium 1.7 1.7 - 2.4 mg/dL   Phosphorus    Collection Time: 10/15/24  5:13 AM    Specimen: Blood, Venous   Result Value Ref Range    Phosphorus 3.0 2.4 - 4.7 mg/dL   CBC with Automated Differential (performable only)    Collection Time: 10/15/24  5:13 AM    Specimen: Blood, Venous   Result Value Ref Range    WBC 7.3 4.2 - 11.0 K/mcL    RBC 1.71 (L) 4.50 - 5.90 mil/mcL    HGB 6.5 (LL) 13.0 - 17.0 g/dL    HCT 18.3 (L) 39.0 - 51.0 %    .0 (H) 78.0 - 100.0 fl    MCH 38.0 (H) 26.0 - 34.0 pg    MCHC 35.5 32.0 - 36.5 g/dL    RDW-CV 23.7 (H) 11.0 - 15.0 %    RDW-SD 89.5 (H) 39.0 - 50.0 fL    PLT 65 (L) 140 - 450 K/mcL    NRBC 1 (H) <=0 /100 WBC    Neutrophil, Percent 88 %    Lymphocytes, Percent 6 %    Mono, Percent 5 %    Eosinophils, Percent 0 %    Basophils, Percent 0 %    Immature Granulocytes 1 %    Absolute Neutrophils 6.4 1.8 - 7.7 K/mcL    Absolute Lymphocytes 0.4 (L) 1.0 - 4.0 K/mcL    Absolute Monocytes 0.4 0.3 - 0.9 K/mcL    Absolute Eosinophils  0.0 0.0 - 0.5 K/mcL    Absolute Basophils 0.0 0.0 - 0.3 K/mcL    Absolute Immature Granulocytes 0.1 0.0 - 0.2 K/mcL   Prothrombin Time (INR/PT)    Collection Time: 10/15/24  5:13 AM    Specimen: Blood, Venous   Result Value Ref Range    Protime- PT 21.2 (H) 9.7 - 11.8 sec    INR 2.1     Manual Differential    Collection Time: 10/15/24  5:13 AM    Specimen: Blood, Venous   Result Value Ref Range    Macrocytosis Few     Microcytosis Few     Ovalocytes Few     Large Platelets Present    GLUCOSE, BEDSIDE - POINT OF CARE    Collection Time: 10/15/24  6:06 AM    Specimen: Blood   Result Value Ref Range    GLUCOSE, BEDSIDE - POINT OF CARE 216 (H) 70 - 99 mg/dL   Prepare Red Blood Cells: 2 Units    Collection Time: 10/15/24  7:48 AM   Result Value Ref Range    UNIT BLOOD TYPE O Pos     ISBT BLOOD TYPE 5100     BLOOD EXPIRATION DATE 36954017981536     UNIT NUMBER [id number]     DISPENSE STATUS Issued     PRODUCT ID  Red Blood Cells     PRODUCT CODE [id number]     PRODUCT DESCRIPTION RBC AS-1 LR     UNIT BLOOD TYPE O Pos     ISBT BLOOD TYPE 5100     BLOOD EXPIRATION DATE 35342384655050     UNIT NUMBER [id number]     DISPENSE STATUS Returned from Issue     PRODUCT ID Red Blood Cells     PRODUCT CODE [id number]     PRODUCT DESCRIPTION RBC AS-3 LR     CROSSMATCH RESULT Compatible     CROSSMATCH RESULT Compatible     ISSUE DATE/TIME [id number]     ISSUE DATE/TIME [id number]    GLUCOSE, BEDSIDE - POINT OF CARE    Collection Time: 10/15/24 11:43 AM    Specimen: Blood   Result Value Ref Range    GLUCOSE, BEDSIDE - POINT OF CARE 199 (H) 70 - 99 mg/dL   Prepare Red Blood Cells    Collection Time: 10/15/24 11:46 AM   Result Value Ref Range    UNIT BLOOD TYPE O Pos     ISBT BLOOD TYPE 5100     BLOOD EXPIRATION DATE 69584281763247     UNIT NUMBER [id number]     DISPENSE STATUS Work In Progress     PRODUCT ID Red Blood Cells     PRODUCT CODE [id number]     PRODUCT DESCRIPTION RBC AS-1 LR    Prepare Red Blood Cells    Collection Time: 10/15/24 11:46 AM   Result Value Ref Range    CROSSMATCH RESULT Compatible     ISBT BLOOD TYPE 5100     BLOOD EXPIRATION DATE 20241108235900     UNIT BLOOD TYPE O Pos     UNIT NUMBER [id number]     DISPENSE STATUS Ready     PRODUCT ID Red Blood Cells     PRODUCT CODE [id number]     PRODUCT DESCRIPTION RBC AS-1 LR    Prepare Red Blood Cells    Collection Time: 10/15/24 11:46 AM   Result Value Ref Range    CROSSMATCH RESULT Compatible     ISBT BLOOD TYPE 5100     BLOOD EXPIRATION DATE 20241108235900     UNIT BLOOD TYPE O Pos     UNIT NUMBER [id number]     DISPENSE STATUS Issued     PRODUCT ID Red Blood Cells     PRODUCT CODE [id number]     ISSUE DATE/TIME [id number]     PRODUCT DESCRIPTION RBC AS-1 LR                  Diagnostic Imaging     LAST ECHO/ECHO STRESS:  === 10/12/24 ===    TRANSTHORACIC ECHO(TTE) COMPLETE W/ W/O IMAGING AGENT    - Narrative -  *[facility]*  *[facility]*  [address] [place] [address]  [phone]  Transthoracic Echocardiogram (TTE)    Patient: Odell Agee     Study Date/Time:     Oct 14 2024 12:00PM  MRN:     [id number]                FIN#:                [id number]  :     1960             Ht/Wt:               172.7cm 96.6kg  Age:     64                     BSA/BMI:             2.18m^2 32.4kg/m^2  Gender:  M                      Baseline BP:         68 / 213  *Ordering Physician:* Ari Arenas MD    *Attending Physician:* Vladimir Freeman  *Performing Physician:* Ari Arenas MD  *Diagnostic Physician:* Ari Arenas MD  *Sonographer:Irlanda Chapman    ------------------------------------------------------------------------------  INDICATIONS:   Shortness of breath.    ------------------------------------------------------------------------------  STUDY CONCLUSIONS  SUMMARY:    1. Left ventricle: The cavity size is normal. Wall thickness is normal.  Systolic function is normal. The ejection fraction was measured by biplane  method of disks. The ejection fraction is 60%.  2. Left atrium: The atrium is mildly dilated.  3. Right ventricle: The cavity size is normal. Wall thickness is normal.  Systolic function is normal.    ------------------------------------------------------------------------------  STUDY DATA:   Procedure:  A transthoracic echocardiogram was performed. Image  quality was good.  M-mode, complete 2D, complete spectral Doppler, and color  Doppler.  Study status:  Routine.  Study completion:  There were no  complications.    FINDINGS    LEFT VENTRICLE:  The cavity size is normal. Wall thickness is normal. Systolic  function is normal. Wall motion is normal; there are no regional wall motion  abnormalities.    The ejection fraction was measured by biplane method of  disks. The ejection fraction is 60%. Left ventricular diastolic function  parameters are normal.    AORTIC VALVE:  The  annulus is normal. The valve is structurally normal. The  valve is trileaflet. The leaflets are normal thickness. Cusp separation is  normal. Velocity is within the normal range. There is no stenosis. There is no  regurgitation.    AORTA:  Aortic root: The root is normal-sized.    MITRAL VALVE:  The valve is structurally normal. The annulus is normal. The  leaflets are normal thickness. Leaflet separation is normal. Inflow velocity  is within the normal range. There is no evidence for stenosis. There is no  regurgitation. The peak diastolic gradient is 3mm Hg.    LEFT ATRIUM:  The atrium is mildly dilated.    RIGHT VENTRICLE:  The cavity size is normal. Wall thickness is normal.  Systolic function is normal.    PULMONIC VALVE:   The valve is structurally normal. Cusp separation is normal.  Velocity is within the normal range. There is no regurgitation.    TRICUSPID VALVE:  The valve is structurally normal. Leaflet separation is  normal. Inflow velocity is within the normal range. There is no regurgitation.    PULMONARY ARTERIES:  The main pulmonary artery is normal-sized. Systolic pressure is within the  normal range.    RIGHT ATRIUM:  The atrium is normal in size.    PERICARDIUM:   There is no pericardial effusion.    SYSTEMIC VEINS:  Inferior vena cava: The IVC is normal-sized.    ------------------------------------------------------------------------------  Measurements    Left ventricle            Value         Ref       01/31/2024  LVOT                     Value          Ref        01/31/2024  KUSH, LAX chord        (N) 5.0    cm     4.2 - 5.8 5.0         Diam, S                  2.0   cm       ---------- 2.0  ESD, LAX chord        (N) 3.7    cm     2.5 - 4.0 3.7         Area                     3.1   cm^2     ---------- 3.1  KUSH/bsa, LAX chord    (N) 2.3    cm/m^2 2.2 - 3.0 2.0  ESD/bsa, LAX chord    (N) 1.7    cm/m^2 1.3 - 2.1 1.4         Right ventricle          Value          Ref        01/31/2024  PW, ED,  LAX           (H) 1.3    cm     0.6 - 1.0 1.1         TAPSE, MM            (N) 2.5   cm       >=1.7      ----------  KUSH major ax, A4C         9.7    cm     --------- 9.6         S' lateral           (H) 13.8  cm/sec   6.0 - 13.4 14.4  ESD major ax, A4C         7.7    cm     --------- 7.9  FS major axis, A4C        21     %      --------- 18          Left atrium              Value          Ref        01/31/2024  KUSH/bsa major ax, A4C     4.5    cm/m^2 --------- 3.8         Area ES, A4C         (H) 25    cm^2     <=20       23  ESD/bsa major ax, A4C     3.5    cm/m^2 --------- 3.1         Area/bsa ES, A4C         11.49 cm^2/m^2 ---------- 9.08  TIFFANIE, A4C                  45.0   cm^2   --------- 40.6        Area ES, A2C             26    cm^2     ---------- 20  DIETER, A4C                  24.9   cm^2   --------- 21.7        Area/bsa ES, A2C         11.72 cm^2/m^2 ---------- 7.79  FAC, A4C                  45     %      --------- 47          Vol, S               (H) 76    ml       18 - 58    50  IVS, ED               (H) 1.1    cm     0.6 - 1.0 1.1         Vol/bsa, S           (H) 35    ml/m^2   16 - 34    20  PW, ED                (H) 1.3    cm     0.6 - 1.0 1.1         Vol, ES, 1-p A4C     (H) 76    ml       18 - 58    58  IVS/PW, ED                0.85          --------- 1.06        Vol/bsa, ES, 1-p A4C (N) 35    ml/m^2   12 - 37    23  EDV                   (N) 127    ml     62 - 150  125         Vol, ES, 1-p A2C     (H) 79    ml       18 - 58    44  ESV                   (N) 51     ml     21 - 61   49          Vol/bsa, ES, 1-p A2C (N) 36    ml/m^2   11 - 43    17  EF                    (N) 60     %      52 - 72   65          Vol, ES, 2-p             79    ml       ---------- 50  SV                        62     ml     --------- 61          Vol/bsa, ES, 2-p     (H) 36    ml/m^2   16 - 34    20  EDV/bsa               (N) 58     ml/m^2 34 - 74   49  ESV/bsa               (N) 23     ml/m^2 11 - 31   19          Mitral  valve             Value          Ref        01/31/2024  SV/bsa                    28     ml/m^2 --------- 24          Peak E                   0.92  m/sec    ---------- 0.75  SV, 1-p A4C               101    ml     --------- 90          Peak A                   0.92  m/sec    ---------- 0.89  SV/bsa, 1-p A4C           46     ml/m^2 --------- 35          Decel time               169   ms       ---------- 282  EDV, 2-p              (N) 150    ml     62 - 150  146         Peak grad, D             3     mm Hg    ---------- 2  ESV, 2-p              (N) 60     ml     21 - 61   54          Peak E/A ratio           1              ---------- 0.8  EF, 2-p               (N) 60     %      52 - 72   ----------  SV, 2-p                   90     ml     --------- 92          Aortic root              Value          Ref        01/31/2024  EDV/bsa, 2-p          (N) 69     ml/m^2 34 - 74   57          Root diam, ED        (N) 3.6   cm       2.8 - 4.3  3.5  ESV/bsa, 2-p          (N) 27     ml/m^2 11 - 31   21  SV/bsa, 2-p               41.3   ml/m^2 --------- 36.1        Ascending aorta          Value          Ref        01/31/2024  E', lat bereket, TDI      (N) 13.4   cm/sec >=10.0    11.9        AAo AP diam, ED      (N) 3.5   cm       2.2 - 3.8  ----------  E/e', lat bereket, TDI    (N) 7             <=13      6           AAo AP diam/bsa, ED  (N) 1.6   cm/m^2   1.1 - 1.9  ----------  E', med bereket, TDI      (N) 8.6    cm/sec >=7.0     8.2  E/e', med bereket, TDI        11            --------- 9  E', avg, TDI              10.995 cm/sec --------- 10.03  E/e', avg, TDI        (N) 8             <=14      8  Legend:  (L)  and  (H)  jason values outside specified reference range.    (N)  marks values inside specified reference range.    Prepared and electronically signed by  Ari Arenas MD  10/14/2024 13:23      === 01/25/24 ===    TRANSTHORACIC ECHO(TTE) COMPLETE W/ W/O IMAGING AGENT    - Narrative -  *[facility]*  *[facility]*  [address] [place] [address]  [phone]  Transthoracic Echocardiogram (TTE)    Patient: Odell Agee     Study Date/Time:       2024 9:11AM  MRN:     [id number]              FIN#:                  [id number]  :     1960           Ht/Wt:                 172.7cm 129.2kg  Age:     63                   BSA/BMI:               2.55m^2 43.3kg/m^2  Gender:  M                    Baseline BP:           114 / 65  Ordering Physician:    Deuce Levine    Attending Physician:   Tameka Lopez  Performing Physician:  Jose Ramires MD  Diagnostic Physician:  Jose Ramires MD  Sonographer:           Chio Haro    ------------------------------------------------------------------------------  INDICATIONS:   Edema.  Congestive heart failure.    ------------------------------------------------------------------------------  STUDY CONCLUSIONS  SUMMARY:    1. Left ventricle: The cavity size is normal. Wall thickness is normal.  Systolic function is normal. The ejection fraction was measured by biplane  method of disks. The ejection fraction is 65%.  2. Right ventricle: The cavity size is normal. Wall thickness is normal.  Systolic function is normal.    ------------------------------------------------------------------------------  STUDY DATA:   Procedure:  A transthoracic echocardiogram was performed. Image  quality was adequate.  M-mode, complete 2D, complete spectral Doppler, and  color Doppler.  Study status:  Routine.  Study completion:  There were no  complications.    FINDINGS    LEFT VENTRICLE:  The cavity size is normal. Wall thickness is normal. Systolic  function is normal. Wall motion is normal; there are no regional wall motion  abnormalities.    The ejection fraction was measured by biplane method of  disks. The ejection fraction is 65%. Left ventricular diastolic function  parameters are normal.    AORTIC VALVE:  The annulus is normal. The valve  is structurally normal. The  valve is trileaflet. The leaflets are normal thickness. Cusp separation is  normal. Velocity is minimally increased. There is no stenosis. There is no  regurgitation. The mean systolic gradient is 10mm Hg. The peak systolic  gradient is 18mm Hg. The LVOT to aortic valve VTI ratio is 0.53. The valve  area is 1.7cm^2. The valve area index is 0.65cm^2/m^2. The ratio of LVOT to  aortic valve peak velocity is 0.53. The valve area is 1.7cm^2. The valve area  index is 0.65cm^2/m^2.    AORTA:  Aortic root: The root is normal-sized.    MITRAL VALVE:  The valve is structurally normal. The annulus is normal. The  leaflets are normal thickness. Leaflet separation is normal. Inflow velocity  is within the normal range. There is no evidence for stenosis. There is no  regurgitation. The peak diastolic gradient is 2mm Hg.    LEFT ATRIUM:  The atrium is normal in size.    RIGHT VENTRICLE:  The cavity size is normal. Wall thickness is normal.  Systolic function is normal.    PULMONIC VALVE:   The valve is structurally normal. Cusp separation is normal.  Velocity is within the normal range. There is no regurgitation. The peak  systolic gradient is 5mm Hg.    TRICUSPID VALVE:  The valve is structurally normal. Leaflet separation is  normal. Inflow velocity is within the normal range. There is mild  regurgitation.    PULMONARY ARTERIES:  The main pulmonary artery is normal-sized. Systolic pressure is within the  normal range.    RIGHT ATRIUM:  The atrium is normal in size.    PERICARDIUM:  The pericardium is normal in appearance.  There is no  pericardial effusion.    SYSTEMIC VEINS:  Inferior vena cava: The IVC is normal-sized.    ------------------------------------------------------------------------------  Measurements    Left ventricle            Value        Ref        Right ventricle          Value          Ref  KUSH, LAX chord        (N) 5.0   cm     4.2 - 5.8  S' lateral           (H) 14.4  cm/sec    6.0 - 13.4  ESD, LAX chord        (N) 3.7   cm     2.5 - 4.0  KUSH/bsa, LAX chord    (L) 2.0   cm/m^2 2.2 - 3.0  Left atrium              Value          Ref  ESD/bsa, LAX chord    (N) 1.4   cm/m^2 1.3 - 2.1  Area ES, A4C         (H) 23    cm^2     <=20  PW, ED, LAX           (H) 1.1   cm     0.6 - 1.0  Area/bsa ES, A4C         9.08  cm^2/m^2 ----------  KUSH major ax, A4C         9.6   cm     ---------  Area ES, A2C             20    cm^2     ----------  ESD major ax, A4C         7.9   cm     ---------  Area/bsa ES, A2C         7.79  cm^2/m^2 ----------  FS major axis, A4C        18    %      ---------  Vol, S               (N) 50    ml       18 - 58  KUSH/bsa major ax, A4C     3.8   cm/m^2 ---------  Vol/bsa, S           (N) 20    ml/m^2   16 - 34  ESD/bsa major ax, A4C     3.1   cm/m^2 ---------  Vol, ES, 1-p A4C     (N) 58    ml       18 - 58  TIFFANIE, A4C                  40.6  cm^2   ---------  Vol/bsa, ES, 1-p A4C (N) 23    ml/m^2   12 - 37  DIETER, A4C                  21.7  cm^2   ---------  Vol, ES, 1-p A2C     (N) 44    ml       18 - 58  FAC, A4C                  47    %      ---------  Vol/bsa, ES, 1-p A2C (N) 17    ml/m^2   11 - 43  IVS, ED               (H) 1.1   cm     0.6 - 1.0  Vol, ES, 2-p             50    ml       ----------  PW, ED                (H) 1.1   cm     0.6 - 1.0  Vol/bsa, ES, 2-p     (N) 20    ml/m^2   16 - 34  IVS/PW, ED                1.06         ---------  EDV                   (N) 125   ml     62 - 150   Aortic valve             Value          Ref  ESV                   (N) 49    ml     21 - 61    Peak v, S                2.2   m/sec    ----------  EF                    (N) 65    %      52 - 72    Mean v, S                1.42  m/sec    ----------  SV                        61    ml     ---------  Mean grad, S             10    mm Hg    ----------  EDV/bsa               (N) 49    ml/m^2 34 - 74    Peak grad, S             18    mm Hg    ----------  ESV/bsa               (N) 19    ml/m^2 11  - 31    LVOT/AV, VTI ratio       0.53           ----------  SV/bsa                    24    ml/m^2 ---------  KEESHA, VTI                 1.7   cm^2     ----------  SV, 1-p A4C               90    ml     ---------  KEESHA/bsa, VTI             0.65  cm^2/m^2 ----------  SV/bsa, 1-p A4C           35    ml/m^2 ---------  LVOT/AV, Vpeak ratio     0.53           ----------  EDV, 2-p              (N) 146   ml     62 - 150   KEESHA, Vmax                1.7   cm^2     ----------  ESV, 2-p              (N) 54    ml     21 - 61    KEESHA/bsa, Vmax            0.65  cm^2/m^2 ----------  SV, 2-p                   92    ml     ---------  EDV/bsa, 2-p          (N) 57    ml/m^2 34 - 74    Mitral valve             Value          Ref  ESV/bsa, 2-p          (N) 21    ml/m^2 11 - 31    Peak E                   0.75  m/sec    ----------  SV/bsa, 2-p               36.1  ml/m^2 ---------  Peak A                   0.89  m/sec    ----------  E', lat bereket, TDI      (N) 11.9  cm/sec >=10.0     Decel time               282   ms       ----------  E/e', lat bereket, TDI    (N) 6            <=13       Peak grad, D             2     mm Hg    ----------  E', med bereket, TDI      (N) 8.2   cm/sec >=7.0      Peak E/A ratio           0.8            ----------  E/e', med bereket, TDI        9            ---------  E', avg, TDI              10.03 cm/sec ---------  Pulmonic valve           Value          Ref  E/e', avg, TDI        (N) 8            <=14       Peak v, S                1.1   m/sec    ----------  Peak grad, S             5     mm Hg    ----------  LVOT                      Value        Ref  Diam, S                   2.0   cm     ---------  Aortic root              Value          Ref  Area                      3.1   cm^2   ---------  Root diam, ED        (N) 3.5   cm       3.1 - 4.5  Peak murphy, S               1.13  m/sec  ---------  Peak grad, S              5     mm Hg  ---------  Legend:  (L)  and  (H)  jason values outside specified reference range.    (N)  marks  values inside specified reference range.    Prepared and electronically signed by  Jose Ramires MD  01/31/2024 16:31    [facility] EXTREMITY UPPER VENOUS DUPLEX   Final Result   1.  No  acute venous thrombosis appreciated.      FOR PHYSICIAN USE ONLY: Please note that this report was generated using   voice recognition software.  If you require clarification or feel that   there is an error in this report, please contact me.         Electronically Signed by: GIL CHATTERJEE M.D.    Signed on: 10/12/2024 3:03 PM    Workstation ID: [id number]      XR CHEST PA OR AP 1 VIEW   Final Result   1.  No acute cardiopulmonary findings visualized by single projection xray.         Electronically Signed by: GIL CHATTERJEE M.D.    Signed on: 10/12/2024 2:29 PM    Workstation ID: [id number]      [facility] EXTREMITY LOWER VENOUS DUPLEX    (Results Pending)         CARDIAC STUDIES:   EKG:   Encounter Date: 10/12/24   Electrocardiogram 12-Lead   Result Value    Ventricular Rate EKG/Min (BPM) 71    QRS-Interval (MSEC) 96    QT-Interval (MSEC) 496    QTc 539    R Axis (Degrees) -4    T Axis (Degrees) 32    REPORT TEXT      Normal sinus rhythm  with  premature atrial complexes  Nonspecific T wave abnormality  Abnormal ECG  Confirmed by HEATHER RENNER, JOSE ([id number]) on 10/14/2024 6:39:11 PM            Assessment & Plan  Principal Problem:    Hyponatremia     64 year old male presenting with      Rectal bleeding, GIB, Anemia  -Hgb 7.3 today was 6.5 (10/13) and he received 2 units of RBC and 2 units of FFP (10/13).   -EGD was not done yesterday, so he is on schedule for today  -GI following  -on octreotide drip   -hgb  6.5 today - getting BT  History of liver cirrhosis  -GI following  Ventricular tachycardia, nonsustained  -He denies chest pain, shortness of breath, or palpitations.  -I did review the monitor showing non sustained VT.   -keep on telemonitor, appears to be in sinus with PACs.   -EKG was repeated noting AFIB, however there are p-waves.    -He had a negative troponin  -Nt Probnp 698  -Echo LVEF nl 60%, no wall motion abn, LA mildly dilated. no contraindication for EGD from cardiac stand point.   -check TSH - normal  Bradycardia  -HR dips in the 30s. Patient has been asymptomatic. Could be related to octreotide use. Continue to monitor.   Hypertension  -bp stable  Obesity  Electrolyte abnormality  -Had hypokalemia 2.8, 3.5. - replace K, today 3.5  -Hypomagnesia 1.3, 1.7  -Hyponatremia improved  7. Elevated creatinine, 2.08     Discussed with nurse, Dr. Deepa Stock NP          Smoking Cessation  Counseling given: Not Answered       DVT Prophylaxis  scds    Code Status    Code Status: Full Resuscitation    Primary Care Physician  Pcp, No                no

## 2024-12-13 PROBLEM — I25.10 ATHEROSCLEROTIC HEART DISEASE OF NATIVE CORONARY ARTERY WITHOUT ANGINA PECTORIS: Chronic | Status: ACTIVE | Noted: 2024-12-10

## 2024-12-13 LAB
ANION GAP SERPL CALC-SCNC: 13 MMOL/L
ANION GAP SERPL CALC-SCNC: 14 MMOL/L
BUN SERPL-MCNC: 21 MG/DL
BUN SERPL-MCNC: 21 MG/DL
CALCIUM SERPL-MCNC: 9.3 MG/DL
CALCIUM SERPL-MCNC: 9.5 MG/DL
CHLORIDE SERPL-SCNC: 100 MMOL/L
CHLORIDE SERPL-SCNC: 101 MMOL/L
CO2 SERPL-SCNC: 22 MMOL/L
CO2 SERPL-SCNC: 23 MMOL/L
CREAT SERPL-MCNC: 1.41 MG/DL
CREAT SERPL-MCNC: 1.45 MG/DL
EGFR: 50 ML/MIN/1.73M2
EGFR: 52 ML/MIN/1.73M2
GLUCOSE SERPL-MCNC: 230 MG/DL
GLUCOSE SERPL-MCNC: 244 MG/DL
POTASSIUM SERPL-SCNC: 4.9 MMOL/L
POTASSIUM SERPL-SCNC: 5.4 MMOL/L
SODIUM SERPL-SCNC: 136 MMOL/L
SODIUM SERPL-SCNC: 137 MMOL/L

## 2024-12-26 ENCOUNTER — APPOINTMENT (OUTPATIENT)
Dept: CARDIOLOGY | Facility: CLINIC | Age: 74
End: 2024-12-26
Payer: MEDICARE

## 2024-12-26 VITALS
DIASTOLIC BLOOD PRESSURE: 76 MMHG | BODY MASS INDEX: 24.07 KG/M2 | HEIGHT: 64 IN | SYSTOLIC BLOOD PRESSURE: 140 MMHG | HEART RATE: 96 BPM | TEMPERATURE: 97.8 F | OXYGEN SATURATION: 99 % | WEIGHT: 141 LBS

## 2024-12-26 VITALS — DIASTOLIC BLOOD PRESSURE: 60 MMHG | SYSTOLIC BLOOD PRESSURE: 128 MMHG

## 2024-12-26 DIAGNOSIS — I25.10 ATHEROSCLEROTIC HEART DISEASE OF NATIVE CORONARY ARTERY W/OUT ANGINA PECTORIS: ICD-10-CM

## 2024-12-26 DIAGNOSIS — D64.9 ANEMIA, UNSPECIFIED: ICD-10-CM

## 2024-12-26 DIAGNOSIS — E78.5 HYPERLIPIDEMIA, UNSPECIFIED: ICD-10-CM

## 2024-12-26 DIAGNOSIS — R07.89 OTHER CHEST PAIN: ICD-10-CM

## 2024-12-26 DIAGNOSIS — E11.9 TYPE 2 DIABETES MELLITUS W/OUT COMPLICATIONS: ICD-10-CM

## 2024-12-26 DIAGNOSIS — I10 ESSENTIAL (PRIMARY) HYPERTENSION: ICD-10-CM

## 2024-12-26 DIAGNOSIS — R06.09 OTHER FORMS OF DYSPNEA: ICD-10-CM

## 2024-12-26 PROCEDURE — 99214 OFFICE O/P EST MOD 30 MIN: CPT | Mod: 25

## 2024-12-26 PROCEDURE — 93000 ELECTROCARDIOGRAM COMPLETE: CPT

## 2024-12-27 LAB
ALBUMIN SERPL ELPH-MCNC: 4.3 G/DL
ALP BLD-CCNC: 69 U/L
ALT SERPL-CCNC: 16 U/L
ANION GAP SERPL CALC-SCNC: 13 MMOL/L
AST SERPL-CCNC: 18 U/L
BASOPHILS # BLD AUTO: 0.06 K/UL
BASOPHILS NFR BLD AUTO: 0.8 %
BILIRUB DIRECT SERPL-MCNC: 0.2 MG/DL
BILIRUB INDIRECT SERPL-MCNC: 0.6 MG/DL
BILIRUB SERPL-MCNC: 0.9 MG/DL
BUN SERPL-MCNC: 22 MG/DL
CALCIUM SERPL-MCNC: 9.7 MG/DL
CHLORIDE SERPL-SCNC: 102 MMOL/L
CHOLEST SERPL-MCNC: 148 MG/DL
CK SERPL-CCNC: 71 U/L
CO2 SERPL-SCNC: 24 MMOL/L
CREAT SERPL-MCNC: 1.32 MG/DL
EGFR: 56 ML/MIN/1.73M2
EOSINOPHIL # BLD AUTO: 0.32 K/UL
EOSINOPHIL NFR BLD AUTO: 4.1 %
ESTIMATED AVERAGE GLUCOSE: 163 MG/DL
FERRITIN SERPL-MCNC: 16 NG/ML
FOLATE SERPL-MCNC: >20 NG/ML
GLUCOSE SERPL-MCNC: 123 MG/DL
HBA1C MFR BLD HPLC: 7.3 %
HCT VFR BLD CALC: 36.9 %
HDLC SERPL-MCNC: 62 MG/DL
HGB A MFR BLD: 97.7 %
HGB A2 MFR BLD: 2.3 %
HGB BLD-MCNC: 11.7 G/DL
HGB FRACT BLD-IMP: NORMAL
IMM GRANULOCYTES NFR BLD AUTO: 0.3 %
IRON SATN MFR SERPL: 11 %
IRON SERPL-MCNC: 50 UG/DL
LDLC SERPL CALC-MCNC: 69 MG/DL
LYMPHOCYTES # BLD AUTO: 1.72 K/UL
LYMPHOCYTES NFR BLD AUTO: 22.2 %
MAN DIFF?: NORMAL
MCHC RBC-ENTMCNC: 27.5 PG
MCHC RBC-ENTMCNC: 31.7 G/DL
MCV RBC AUTO: 86.8 FL
MONOCYTES # BLD AUTO: 0.74 K/UL
MONOCYTES NFR BLD AUTO: 9.5 %
NEUTROPHILS # BLD AUTO: 4.89 K/UL
NEUTROPHILS NFR BLD AUTO: 63.1 %
NONHDLC SERPL-MCNC: 86 MG/DL
PLATELET # BLD AUTO: 436 K/UL
POTASSIUM SERPL-SCNC: 5.1 MMOL/L
PROT SERPL-MCNC: 6.8 G/DL
RBC # BLD: 4.25 M/UL
RBC # FLD: 12.2 %
SODIUM SERPL-SCNC: 138 MMOL/L
TIBC SERPL-MCNC: 437 UG/DL
TRANSFERRIN SERPL-MCNC: 353 MG/DL
TRIGL SERPL-MCNC: 92 MG/DL
UIBC SERPL-MCNC: 387 UG/DL
VIT B12 SERPL-MCNC: 502 PG/ML
WBC # FLD AUTO: 7.75 K/UL

## 2025-03-03 ENCOUNTER — APPOINTMENT (OUTPATIENT)
Dept: ENDOCRINOLOGY | Facility: CLINIC | Age: 75
End: 2025-03-03
Payer: MEDICARE

## 2025-03-03 VITALS
WEIGHT: 137.19 LBS | HEIGHT: 64 IN | DIASTOLIC BLOOD PRESSURE: 82 MMHG | SYSTOLIC BLOOD PRESSURE: 142 MMHG | BODY MASS INDEX: 23.42 KG/M2 | OXYGEN SATURATION: 99 % | HEART RATE: 112 BPM

## 2025-03-03 VITALS — SYSTOLIC BLOOD PRESSURE: 132 MMHG | DIASTOLIC BLOOD PRESSURE: 68 MMHG

## 2025-03-03 DIAGNOSIS — E78.5 HYPERLIPIDEMIA, UNSPECIFIED: ICD-10-CM

## 2025-03-03 DIAGNOSIS — I10 ESSENTIAL (PRIMARY) HYPERTENSION: ICD-10-CM

## 2025-03-03 DIAGNOSIS — E11.9 TYPE 2 DIABETES MELLITUS W/OUT COMPLICATIONS: ICD-10-CM

## 2025-03-03 DIAGNOSIS — E55.9 VITAMIN D DEFICIENCY, UNSPECIFIED: ICD-10-CM

## 2025-03-03 LAB
GLUCOSE BLDC GLUCOMTR-MCNC: 175
HBA1C MFR BLD HPLC: 7.5

## 2025-03-03 PROCEDURE — 82962 GLUCOSE BLOOD TEST: CPT

## 2025-03-03 PROCEDURE — 99214 OFFICE O/P EST MOD 30 MIN: CPT | Mod: 25

## 2025-03-03 PROCEDURE — 83036 HEMOGLOBIN GLYCOSYLATED A1C: CPT | Mod: QW

## 2025-04-18 ENCOUNTER — APPOINTMENT (OUTPATIENT)
Dept: CARDIOLOGY | Facility: CLINIC | Age: 75
End: 2025-04-18
Payer: MEDICARE

## 2025-04-18 VITALS
SYSTOLIC BLOOD PRESSURE: 120 MMHG | HEART RATE: 95 BPM | WEIGHT: 142 LBS | OXYGEN SATURATION: 99 % | TEMPERATURE: 97.9 F | BODY MASS INDEX: 24.24 KG/M2 | HEIGHT: 64 IN | DIASTOLIC BLOOD PRESSURE: 60 MMHG

## 2025-04-18 DIAGNOSIS — D64.9 ANEMIA, UNSPECIFIED: ICD-10-CM

## 2025-04-18 DIAGNOSIS — R07.89 OTHER CHEST PAIN: ICD-10-CM

## 2025-04-18 DIAGNOSIS — Z13.228 ENCOUNTER FOR SCREENING FOR OTHER METABOLIC DISORDERS: ICD-10-CM

## 2025-04-18 DIAGNOSIS — I65.23 OCCLUSION AND STENOSIS OF BILATERAL CAROTID ARTERIES: ICD-10-CM

## 2025-04-18 DIAGNOSIS — I25.10 ATHEROSCLEROTIC HEART DISEASE OF NATIVE CORONARY ARTERY W/OUT ANGINA PECTORIS: ICD-10-CM

## 2025-04-18 DIAGNOSIS — E78.5 HYPERLIPIDEMIA, UNSPECIFIED: ICD-10-CM

## 2025-04-18 DIAGNOSIS — I10 ESSENTIAL (PRIMARY) HYPERTENSION: ICD-10-CM

## 2025-04-18 DIAGNOSIS — R79.89 OTHER SPECIFIED ABNORMAL FINDINGS OF BLOOD CHEMISTRY: ICD-10-CM

## 2025-04-18 DIAGNOSIS — E11.9 TYPE 2 DIABETES MELLITUS W/OUT COMPLICATIONS: ICD-10-CM

## 2025-04-18 DIAGNOSIS — R06.09 OTHER FORMS OF DYSPNEA: ICD-10-CM

## 2025-04-18 PROCEDURE — 99214 OFFICE O/P EST MOD 30 MIN: CPT | Mod: 25

## 2025-04-18 PROCEDURE — 93000 ELECTROCARDIOGRAM COMPLETE: CPT

## 2025-04-22 LAB
ALBUMIN SERPL ELPH-MCNC: 4.6 G/DL
ALP BLD-CCNC: 65 U/L
ALT SERPL-CCNC: 15 U/L
ANION GAP SERPL CALC-SCNC: 14 MMOL/L
APPEARANCE: CLEAR
AST SERPL-CCNC: 13 U/L
BACTERIA: NEGATIVE /HPF
BASOPHILS # BLD AUTO: 0.06 K/UL
BASOPHILS NFR BLD AUTO: 0.7 %
BILIRUB DIRECT SERPL-MCNC: 0.2 MG/DL
BILIRUB INDIRECT SERPL-MCNC: 0.5 MG/DL
BILIRUB SERPL-MCNC: 0.7 MG/DL
BILIRUBIN URINE: NEGATIVE
BLOOD URINE: NEGATIVE
BUN SERPL-MCNC: 28 MG/DL
CALCIUM SERPL-MCNC: 9.9 MG/DL
CAST: 0 /LPF
CHLORIDE SERPL-SCNC: 102 MMOL/L
CHOLEST SERPL-MCNC: 148 MG/DL
CK SERPL-CCNC: 68 U/L
CO2 SERPL-SCNC: 20 MMOL/L
COLOR: YELLOW
CREAT SERPL-MCNC: 1.42 MG/DL
EGFRCR SERPLBLD CKD-EPI 2021: 51 ML/MIN/1.73M2
EOSINOPHIL # BLD AUTO: 0.4 K/UL
EOSINOPHIL NFR BLD AUTO: 4.5 %
EPITHELIAL CELLS: 0 /HPF
ESTIMATED AVERAGE GLUCOSE: 169 MG/DL
FERRITIN SERPL-MCNC: 13 NG/ML
GLUCOSE QUALITATIVE U: >=1000 MG/DL
GLUCOSE SERPL-MCNC: 103 MG/DL
HBA1C MFR BLD HPLC: 7.5 %
HCT VFR BLD CALC: 38.6 %
HDLC SERPL-MCNC: 61 MG/DL
HGB BLD-MCNC: 11.5 G/DL
IMM GRANULOCYTES NFR BLD AUTO: 0.2 %
IRON SATN MFR SERPL: 6 %
IRON SERPL-MCNC: 24 UG/DL
KETONES URINE: NEGATIVE MG/DL
LDLC SERPL-MCNC: 72 MG/DL
LEUKOCYTE ESTERASE URINE: ABNORMAL
LYMPHOCYTES # BLD AUTO: 1.81 K/UL
LYMPHOCYTES NFR BLD AUTO: 20.3 %
MAGNESIUM SERPL-MCNC: 2 MG/DL
MAN DIFF?: NORMAL
MCHC RBC-ENTMCNC: 23.9 PG
MCHC RBC-ENTMCNC: 29.8 G/DL
MCV RBC AUTO: 80.2 FL
MICROSCOPIC-UA: NORMAL
MONOCYTES # BLD AUTO: 0.74 K/UL
MONOCYTES NFR BLD AUTO: 8.3 %
NEUTROPHILS # BLD AUTO: 5.9 K/UL
NEUTROPHILS NFR BLD AUTO: 66 %
NITRITE URINE: NEGATIVE
NONHDLC SERPL-MCNC: 86 MG/DL
PH URINE: 6.5
PLATELET # BLD AUTO: 485 K/UL
POTASSIUM SERPL-SCNC: 5.3 MMOL/L
PROT SERPL-MCNC: 7 G/DL
PROTEIN URINE: NEGATIVE MG/DL
RBC # BLD: 4.81 M/UL
RBC # FLD: 14.1 %
RED BLOOD CELLS URINE: 0 /HPF
REVIEW: NORMAL
SODIUM SERPL-SCNC: 137 MMOL/L
SPECIFIC GRAVITY URINE: 1.02
T3FREE SERPL-MCNC: 2.76 PG/ML
T4 FREE SERPL-MCNC: 1.2 NG/DL
TIBC SERPL-MCNC: 429 UG/DL
TRANSFERRIN SERPL-MCNC: 377 MG/DL
TRIGL SERPL-MCNC: 74 MG/DL
TSH SERPL-ACNC: 3.25 UIU/ML
UIBC SERPL-MCNC: 404 UG/DL
UROBILINOGEN URINE: 0.2 MG/DL
WBC # FLD AUTO: 8.93 K/UL
WHITE BLOOD CELLS URINE: 2 /HPF

## 2025-05-27 ENCOUNTER — APPOINTMENT (OUTPATIENT)
Dept: DERMATOLOGY | Facility: CLINIC | Age: 75
End: 2025-05-27
Payer: MEDICARE

## 2025-05-27 DIAGNOSIS — L81.0 POSTINFLAMMATORY HYPERPIGMENTATION: ICD-10-CM

## 2025-05-27 DIAGNOSIS — L85.3 XEROSIS CUTIS: ICD-10-CM

## 2025-05-27 DIAGNOSIS — L28.0 LICHEN SIMPLEX CHRONICUS: ICD-10-CM

## 2025-05-27 PROCEDURE — 99204 OFFICE O/P NEW MOD 45 MIN: CPT

## 2025-05-27 RX ORDER — MOMETASONE FUROATE 1 MG/G
0.1 CREAM TOPICAL
Qty: 1 | Refills: 2 | Status: ACTIVE | COMMUNITY
Start: 2025-05-27 | End: 1900-01-01

## 2025-06-01 ENCOUNTER — INPATIENT (INPATIENT)
Facility: HOSPITAL | Age: 75
LOS: 3 days | Discharge: HOME HEALTH | DRG: 871 | End: 2025-06-05
Attending: INTERNAL MEDICINE | Admitting: HOSPITALIST
Payer: MEDICARE

## 2025-06-01 VITALS
WEIGHT: 145.06 LBS | DIASTOLIC BLOOD PRESSURE: 64 MMHG | HEART RATE: 133 BPM | TEMPERATURE: 101 F | HEIGHT: 64 IN | OXYGEN SATURATION: 95 % | RESPIRATION RATE: 18 BRPM | SYSTOLIC BLOOD PRESSURE: 105 MMHG

## 2025-06-01 DIAGNOSIS — M48.8X2 OTHER SPECIFIED SPONDYLOPATHIES, CERVICAL REGION: Chronic | ICD-10-CM

## 2025-06-01 DIAGNOSIS — Z95.5 PRESENCE OF CORONARY ANGIOPLASTY IMPLANT AND GRAFT: Chronic | ICD-10-CM

## 2025-06-01 LAB
ALBUMIN SERPL ELPH-MCNC: 3.6 G/DL — SIGNIFICANT CHANGE UP (ref 3.3–5)
ALP SERPL-CCNC: 72 U/L — SIGNIFICANT CHANGE UP (ref 40–120)
ALT FLD-CCNC: 19 U/L — SIGNIFICANT CHANGE UP (ref 10–45)
ANION GAP SERPL CALC-SCNC: 14 MMOL/L — SIGNIFICANT CHANGE UP (ref 5–17)
APTT BLD: 35 SEC — SIGNIFICANT CHANGE UP (ref 26.1–36.8)
AST SERPL-CCNC: 26 U/L — SIGNIFICANT CHANGE UP (ref 10–40)
BILIRUB SERPL-MCNC: 0.8 MG/DL — SIGNIFICANT CHANGE UP (ref 0.2–1.2)
BUN SERPL-MCNC: 31 MG/DL — HIGH (ref 7–23)
CALCIUM SERPL-MCNC: 9.4 MG/DL — SIGNIFICANT CHANGE UP (ref 8.4–10.5)
CHLORIDE SERPL-SCNC: 100 MMOL/L — SIGNIFICANT CHANGE UP (ref 96–108)
CO2 SERPL-SCNC: 18 MMOL/L — LOW (ref 22–31)
CREAT SERPL-MCNC: 2.15 MG/DL — HIGH (ref 0.5–1.3)
EGFR: 31 ML/MIN/1.73M2 — LOW
EGFR: 31 ML/MIN/1.73M2 — LOW
GAS PNL BLDV: SIGNIFICANT CHANGE UP
GLUCOSE SERPL-MCNC: 272 MG/DL — HIGH (ref 70–99)
HCT VFR BLD CALC: 34.6 % — LOW (ref 39–50)
HGB BLD-MCNC: 10.5 G/DL — LOW (ref 13–17)
INR BLD: 1.31 RATIO — HIGH (ref 0.85–1.16)
LACTATE SERPL-SCNC: 2 MMOL/L — SIGNIFICANT CHANGE UP (ref 0.5–2)
MCHC RBC-ENTMCNC: 23 PG — LOW (ref 27–34)
MCHC RBC-ENTMCNC: 30.3 G/DL — LOW (ref 32–36)
MCV RBC AUTO: 75.9 FL — LOW (ref 80–100)
PLATELET # BLD AUTO: 369 K/UL — SIGNIFICANT CHANGE UP (ref 150–400)
POTASSIUM SERPL-MCNC: 5.1 MMOL/L — SIGNIFICANT CHANGE UP (ref 3.5–5.3)
POTASSIUM SERPL-SCNC: 5.1 MMOL/L — SIGNIFICANT CHANGE UP (ref 3.5–5.3)
PROT SERPL-MCNC: 7.1 G/DL — SIGNIFICANT CHANGE UP (ref 6–8.3)
PROTHROM AB SERPL-ACNC: 15 SEC — HIGH (ref 9.9–13.4)
RBC # BLD: 4.56 M/UL — SIGNIFICANT CHANGE UP (ref 4.2–5.8)
RBC # FLD: 14.8 % — HIGH (ref 10.3–14.5)
SODIUM SERPL-SCNC: 132 MMOL/L — LOW (ref 135–145)
WBC # BLD: 15.79 K/UL — HIGH (ref 3.8–10.5)
WBC # FLD AUTO: 15.79 K/UL — HIGH (ref 3.8–10.5)

## 2025-06-01 PROCEDURE — 71045 X-RAY EXAM CHEST 1 VIEW: CPT | Mod: 26

## 2025-06-01 PROCEDURE — 99285 EMERGENCY DEPT VISIT HI MDM: CPT

## 2025-06-01 PROCEDURE — 93010 ELECTROCARDIOGRAM REPORT: CPT

## 2025-06-01 NOTE — ED ADULT NURSE NOTE - OBJECTIVE STATEMENT
74 y M PMH CAD, HTN, DM2 presented to ED for chest pain. Pt endorses having fevers and chills x past few days. Pt fell today due to dizziness had a head strike, no loc, vomiting. pt c/o of cough (non-productive). Pt denies  SOB, headaches, abdominal pain,  back pain, n/v/d, changed in vision, urinary symptoms, sick contacts, travel. Pt alert & oriented x 3 . able to follow commands. Airway patent, speech clear. Breathing spontaneous and nonlabored. On cardiac monitor, normal sinus rhythm . Abdomen soft & nondistended. Pt spontaneously moving all extremities, . Sensory intact. b/l palpable radial and dorsal pulses intact.  Skin warm, dry & intact, normal color for race. Pt in gown, stretcher locked and in lowest position . Call bell within reach and instructed on how to use.

## 2025-06-01 NOTE — ED ADULT NURSE NOTE - NSFALLUNIVINTERV_ED_ALL_ED
Bed/Stretcher in lowest position, wheels locked, appropriate side rails in place/Call bell, personal items and telephone in reach/Instruct patient to call for assistance before getting out of bed/chair/stretcher/Non-slip footwear applied when patient is off stretcher/[place] to call system/Physically safe environment - no spills, clutter or unnecessary equipment/Purposeful proactive rounding/Room/bathroom lighting operational, light cord in reach

## 2025-06-02 DIAGNOSIS — I25.10 ATHEROSCLEROTIC HEART DISEASE OF NATIVE CORONARY ARTERY WITHOUT ANGINA PECTORIS: ICD-10-CM

## 2025-06-02 DIAGNOSIS — N17.9 ACUTE KIDNEY FAILURE, UNSPECIFIED: ICD-10-CM

## 2025-06-02 DIAGNOSIS — E11.9 TYPE 2 DIABETES MELLITUS WITHOUT COMPLICATIONS: ICD-10-CM

## 2025-06-02 DIAGNOSIS — Z29.9 ENCOUNTER FOR PROPHYLACTIC MEASURES, UNSPECIFIED: ICD-10-CM

## 2025-06-02 DIAGNOSIS — J18.9 PNEUMONIA, UNSPECIFIED ORGANISM: ICD-10-CM

## 2025-06-02 DIAGNOSIS — E27.9 DISORDER OF ADRENAL GLAND, UNSPECIFIED: ICD-10-CM

## 2025-06-02 DIAGNOSIS — A41.9 SEPSIS, UNSPECIFIED ORGANISM: ICD-10-CM

## 2025-06-02 LAB
ADD ON TEST-SPECIMEN IN LAB: SIGNIFICANT CHANGE UP
ALBUMIN SERPL ELPH-MCNC: 3.7 G/DL — SIGNIFICANT CHANGE UP (ref 3.3–5)
ALP SERPL-CCNC: 72 U/L — SIGNIFICANT CHANGE UP (ref 40–120)
ALT FLD-CCNC: 23 U/L — SIGNIFICANT CHANGE UP (ref 10–45)
ANION GAP SERPL CALC-SCNC: 14 MMOL/L — SIGNIFICANT CHANGE UP (ref 5–17)
ANISOCYTOSIS BLD QL: SLIGHT — SIGNIFICANT CHANGE UP
APPEARANCE UR: CLEAR — SIGNIFICANT CHANGE UP
AST SERPL-CCNC: 28 U/L — SIGNIFICANT CHANGE UP (ref 10–40)
BACTERIA # UR AUTO: NEGATIVE /HPF — SIGNIFICANT CHANGE UP
BASOPHILS # BLD AUTO: 0 K/UL — SIGNIFICANT CHANGE UP (ref 0–0.2)
BASOPHILS NFR BLD AUTO: 0 % — SIGNIFICANT CHANGE UP (ref 0–2)
BILIRUB SERPL-MCNC: 0.6 MG/DL — SIGNIFICANT CHANGE UP (ref 0.2–1.2)
BILIRUB UR-MCNC: NEGATIVE — SIGNIFICANT CHANGE UP
BUN SERPL-MCNC: 29 MG/DL — HIGH (ref 7–23)
BURR CELLS BLD QL SMEAR: PRESENT — SIGNIFICANT CHANGE UP
CALCIUM SERPL-MCNC: 9.3 MG/DL — SIGNIFICANT CHANGE UP (ref 8.4–10.5)
CAST: 2 /LPF — SIGNIFICANT CHANGE UP (ref 0–4)
CHLORIDE SERPL-SCNC: 101 MMOL/L — SIGNIFICANT CHANGE UP (ref 96–108)
CK MB BLD-MCNC: 0.8 % — SIGNIFICANT CHANGE UP (ref 0–3.5)
CK MB CFR SERPL CALC: 2.4 NG/ML — SIGNIFICANT CHANGE UP (ref 0–6.7)
CK SERPL-CCNC: 316 U/L — HIGH (ref 30–200)
CO2 SERPL-SCNC: 18 MMOL/L — LOW (ref 22–31)
COLOR SPEC: YELLOW — SIGNIFICANT CHANGE UP
CREAT SERPL-MCNC: 1.97 MG/DL — HIGH (ref 0.5–1.3)
DACRYOCYTES BLD QL SMEAR: SLIGHT — SIGNIFICANT CHANGE UP
DIFF PNL FLD: ABNORMAL
EGFR: 35 ML/MIN/1.73M2 — LOW
EGFR: 35 ML/MIN/1.73M2 — LOW
ELLIPTOCYTES BLD QL SMEAR: SIGNIFICANT CHANGE UP
EOSINOPHIL # BLD AUTO: 0 K/UL — SIGNIFICANT CHANGE UP (ref 0–0.5)
EOSINOPHIL NFR BLD AUTO: 0 % — SIGNIFICANT CHANGE UP (ref 0–6)
FLUAV AG NPH QL: SIGNIFICANT CHANGE UP
FLUBV AG NPH QL: SIGNIFICANT CHANGE UP
GAS PNL BLDV: SIGNIFICANT CHANGE UP
GIANT PLATELETS BLD QL SMEAR: PRESENT — SIGNIFICANT CHANGE UP
GLUCOSE BLDC GLUCOMTR-MCNC: 116 MG/DL — HIGH (ref 70–99)
GLUCOSE BLDC GLUCOMTR-MCNC: 126 MG/DL — HIGH (ref 70–99)
GLUCOSE BLDC GLUCOMTR-MCNC: 202 MG/DL — HIGH (ref 70–99)
GLUCOSE SERPL-MCNC: 156 MG/DL — HIGH (ref 70–99)
GLUCOSE UR QL: >=1000 MG/DL
KETONES UR QL: ABNORMAL MG/DL
LEUKOCYTE ESTERASE UR-ACNC: NEGATIVE — SIGNIFICANT CHANGE UP
LYMPHOCYTES # BLD AUTO: 0.55 K/UL — LOW (ref 1–3.3)
LYMPHOCYTES # BLD AUTO: 3.5 % — LOW (ref 13–44)
MANUAL SMEAR VERIFICATION: SIGNIFICANT CHANGE UP
MICROCYTES BLD QL: SLIGHT — SIGNIFICANT CHANGE UP
MONOCYTES # BLD AUTO: 0.96 K/UL — HIGH (ref 0–0.9)
MONOCYTES NFR BLD AUTO: 6.1 % — SIGNIFICANT CHANGE UP (ref 2–14)
NEUTROPHILS # BLD AUTO: 14.27 K/UL — HIGH (ref 1.8–7.4)
NEUTROPHILS NFR BLD AUTO: 85.2 % — HIGH (ref 43–77)
NEUTS BAND # BLD: 5.2 % — SIGNIFICANT CHANGE UP (ref 0–8)
NEUTS BAND NFR BLD: 5.2 % — SIGNIFICANT CHANGE UP (ref 0–8)
NITRITE UR-MCNC: NEGATIVE — SIGNIFICANT CHANGE UP
PH UR: 5.5 — SIGNIFICANT CHANGE UP (ref 5–8)
PLAT MORPH BLD: NORMAL — SIGNIFICANT CHANGE UP
POIKILOCYTOSIS BLD QL AUTO: SIGNIFICANT CHANGE UP
POLYCHROMASIA BLD QL SMEAR: SLIGHT — SIGNIFICANT CHANGE UP
POTASSIUM SERPL-MCNC: 4.3 MMOL/L — SIGNIFICANT CHANGE UP (ref 3.5–5.3)
POTASSIUM SERPL-SCNC: 4.3 MMOL/L — SIGNIFICANT CHANGE UP (ref 3.5–5.3)
PROCALCITONIN SERPL-MCNC: 2.01 NG/ML — HIGH (ref 0.02–0.1)
PROT SERPL-MCNC: 7.4 G/DL — SIGNIFICANT CHANGE UP (ref 6–8.3)
PROT UR-MCNC: 100 MG/DL
RBC BLD AUTO: ABNORMAL
RBC CASTS # UR COMP ASSIST: 0 /HPF — SIGNIFICANT CHANGE UP (ref 0–4)
RSV RNA NPH QL NAA+NON-PROBE: SIGNIFICANT CHANGE UP
SARS-COV-2 RNA SPEC QL NAA+PROBE: SIGNIFICANT CHANGE UP
SODIUM SERPL-SCNC: 133 MMOL/L — LOW (ref 135–145)
SOURCE RESPIRATORY: SIGNIFICANT CHANGE UP
SP GR SPEC: >1.03 — HIGH (ref 1–1.03)
SQUAMOUS # UR AUTO: 2 /HPF — SIGNIFICANT CHANGE UP (ref 0–5)
TROPONIN T, HIGH SENSITIVITY RESULT: 28 NG/L — SIGNIFICANT CHANGE UP (ref 0–51)
UROBILINOGEN FLD QL: 1 MG/DL — SIGNIFICANT CHANGE UP (ref 0.2–1)
WBC UR QL: 5 /HPF — SIGNIFICANT CHANGE UP (ref 0–5)

## 2025-06-02 PROCEDURE — 99223 1ST HOSP IP/OBS HIGH 75: CPT

## 2025-06-02 PROCEDURE — 99497 ADVNCD CARE PLAN 30 MIN: CPT | Mod: 25

## 2025-06-02 PROCEDURE — 99223 1ST HOSP IP/OBS HIGH 75: CPT | Mod: 25

## 2025-06-02 PROCEDURE — 93308 TTE F-UP OR LMTD: CPT | Mod: 26

## 2025-06-02 PROCEDURE — 71250 CT THORAX DX C-: CPT | Mod: 26

## 2025-06-02 PROCEDURE — 74176 CT ABD & PELVIS W/O CONTRAST: CPT | Mod: 26

## 2025-06-02 RX ORDER — ACETAMINOPHEN 500 MG/5ML
650 LIQUID (ML) ORAL EVERY 6 HOURS
Refills: 0 | Status: DISCONTINUED | OUTPATIENT
Start: 2025-06-02 | End: 2025-06-05

## 2025-06-02 RX ORDER — SODIUM CHLORIDE 9 G/1000ML
1000 INJECTION, SOLUTION INTRAVENOUS
Refills: 0 | Status: DISCONTINUED | OUTPATIENT
Start: 2025-06-02 | End: 2025-06-05

## 2025-06-02 RX ORDER — INSULIN LISPRO 100 U/ML
INJECTION, SOLUTION INTRAVENOUS; SUBCUTANEOUS AT BEDTIME
Refills: 0 | Status: DISCONTINUED | OUTPATIENT
Start: 2025-06-02 | End: 2025-06-05

## 2025-06-02 RX ORDER — ONDANSETRON HCL/PF 4 MG/2 ML
4 VIAL (ML) INJECTION EVERY 8 HOURS
Refills: 0 | Status: DISCONTINUED | OUTPATIENT
Start: 2025-06-02 | End: 2025-06-03

## 2025-06-02 RX ORDER — ACETAMINOPHEN 500 MG/5ML
1000 LIQUID (ML) ORAL ONCE
Refills: 0 | Status: COMPLETED | OUTPATIENT
Start: 2025-06-02 | End: 2025-06-02

## 2025-06-02 RX ORDER — ALBUTEROL SULFATE 2.5 MG/3ML
2.5 VIAL, NEBULIZER (ML) INHALATION EVERY 8 HOURS
Refills: 0 | Status: DISCONTINUED | OUTPATIENT
Start: 2025-06-02 | End: 2025-06-04

## 2025-06-02 RX ORDER — SODIUM CHLORIDE 9 G/1000ML
1000 INJECTION, SOLUTION INTRAVENOUS
Refills: 0 | Status: DISCONTINUED | OUTPATIENT
Start: 2025-06-02 | End: 2025-06-02

## 2025-06-02 RX ORDER — DEXTROSE 50 % IN WATER 50 %
12.5 SYRINGE (ML) INTRAVENOUS ONCE
Refills: 0 | Status: DISCONTINUED | OUTPATIENT
Start: 2025-06-02 | End: 2025-06-05

## 2025-06-02 RX ORDER — TAMSULOSIN HYDROCHLORIDE 0.4 MG/1
0.4 CAPSULE ORAL AT BEDTIME
Refills: 0 | Status: DISCONTINUED | OUTPATIENT
Start: 2025-06-02 | End: 2025-06-05

## 2025-06-02 RX ORDER — INSULIN LISPRO 100 U/ML
INJECTION, SOLUTION INTRAVENOUS; SUBCUTANEOUS
Refills: 0 | Status: DISCONTINUED | OUTPATIENT
Start: 2025-06-02 | End: 2025-06-05

## 2025-06-02 RX ORDER — EZETIMIBE 10 MG/1
10 TABLET ORAL DAILY
Refills: 0 | Status: DISCONTINUED | OUTPATIENT
Start: 2025-06-02 | End: 2025-06-05

## 2025-06-02 RX ORDER — DEXTROMETHORPHAN HBR, GUAIFENESIN 200 MG/10ML
600 LIQUID ORAL EVERY 12 HOURS
Refills: 0 | Status: DISCONTINUED | OUTPATIENT
Start: 2025-06-02 | End: 2025-06-05

## 2025-06-02 RX ORDER — CEFTRIAXONE 500 MG/1
1000 INJECTION, POWDER, FOR SOLUTION INTRAMUSCULAR; INTRAVENOUS EVERY 24 HOURS
Refills: 0 | Status: DISCONTINUED | OUTPATIENT
Start: 2025-06-02 | End: 2025-06-04

## 2025-06-02 RX ORDER — ASPIRIN 325 MG
81 TABLET ORAL DAILY
Refills: 0 | Status: DISCONTINUED | OUTPATIENT
Start: 2025-06-02 | End: 2025-06-05

## 2025-06-02 RX ORDER — AZITHROMYCIN 250 MG
500 CAPSULE ORAL ONCE
Refills: 0 | Status: COMPLETED | OUTPATIENT
Start: 2025-06-02 | End: 2025-06-02

## 2025-06-02 RX ORDER — AZITHROMYCIN 250 MG
500 CAPSULE ORAL EVERY 24 HOURS
Refills: 0 | Status: DISCONTINUED | OUTPATIENT
Start: 2025-06-02 | End: 2025-06-05

## 2025-06-02 RX ORDER — POLYETHYLENE GLYCOL 3350 17 G/17G
17 POWDER, FOR SOLUTION ORAL DAILY
Refills: 0 | Status: DISCONTINUED | OUTPATIENT
Start: 2025-06-02 | End: 2025-06-05

## 2025-06-02 RX ORDER — GLUCAGON 3 MG/1
1 POWDER NASAL ONCE
Refills: 0 | Status: DISCONTINUED | OUTPATIENT
Start: 2025-06-02 | End: 2025-06-05

## 2025-06-02 RX ORDER — DEXTROSE 50 % IN WATER 50 %
25 SYRINGE (ML) INTRAVENOUS ONCE
Refills: 0 | Status: DISCONTINUED | OUTPATIENT
Start: 2025-06-02 | End: 2025-06-05

## 2025-06-02 RX ORDER — ACETAMINOPHEN 500 MG/5ML
650 LIQUID (ML) ORAL ONCE
Refills: 0 | Status: COMPLETED | OUTPATIENT
Start: 2025-06-02 | End: 2025-06-02

## 2025-06-02 RX ORDER — DEXTROSE 50 % IN WATER 50 %
15 SYRINGE (ML) INTRAVENOUS ONCE
Refills: 0 | Status: DISCONTINUED | OUTPATIENT
Start: 2025-06-02 | End: 2025-06-05

## 2025-06-02 RX ORDER — MAGNESIUM, ALUMINUM HYDROXIDE 200-200 MG
30 TABLET,CHEWABLE ORAL EVERY 4 HOURS
Refills: 0 | Status: DISCONTINUED | OUTPATIENT
Start: 2025-06-02 | End: 2025-06-03

## 2025-06-02 RX ORDER — CEFTRIAXONE 500 MG/1
1000 INJECTION, POWDER, FOR SOLUTION INTRAMUSCULAR; INTRAVENOUS ONCE
Refills: 0 | Status: COMPLETED | OUTPATIENT
Start: 2025-06-02 | End: 2025-06-02

## 2025-06-02 RX ORDER — CLOPIDOGREL BISULFATE 75 MG/1
75 TABLET, FILM COATED ORAL DAILY
Refills: 0 | Status: DISCONTINUED | OUTPATIENT
Start: 2025-06-02 | End: 2025-06-05

## 2025-06-02 RX ORDER — MELATONIN 5 MG
3 TABLET ORAL AT BEDTIME
Refills: 0 | Status: DISCONTINUED | OUTPATIENT
Start: 2025-06-02 | End: 2025-06-03

## 2025-06-02 RX ADMIN — TAMSULOSIN HYDROCHLORIDE 0.4 MILLIGRAM(S): 0.4 CAPSULE ORAL at 21:34

## 2025-06-02 RX ADMIN — Medication 650 MILLIGRAM(S): at 02:12

## 2025-06-02 RX ADMIN — Medication 500 MILLILITER(S): at 13:46

## 2025-06-02 RX ADMIN — SODIUM CHLORIDE 75 MILLILITER(S): 9 INJECTION, SOLUTION INTRAVENOUS at 17:10

## 2025-06-02 RX ADMIN — Medication 250 MILLILITER(S): at 00:04

## 2025-06-02 RX ADMIN — Medication 2.5 MILLIGRAM(S): at 21:35

## 2025-06-02 RX ADMIN — Medication 650 MILLIGRAM(S): at 01:16

## 2025-06-02 RX ADMIN — Medication 400 MILLIGRAM(S): at 17:01

## 2025-06-02 RX ADMIN — Medication 1000 MILLIGRAM(S): at 17:30

## 2025-06-02 RX ADMIN — INSULIN LISPRO 2: 100 INJECTION, SOLUTION INTRAVENOUS; SUBCUTANEOUS at 17:50

## 2025-06-02 RX ADMIN — Medication 2.5 MILLIGRAM(S): at 13:46

## 2025-06-02 RX ADMIN — POLYETHYLENE GLYCOL 3350 17 GRAM(S): 17 POWDER, FOR SOLUTION ORAL at 13:46

## 2025-06-02 RX ADMIN — Medication 650 MILLIGRAM(S): at 10:46

## 2025-06-02 RX ADMIN — DEXTROMETHORPHAN HBR, GUAIFENESIN 600 MILLIGRAM(S): 200 LIQUID ORAL at 11:36

## 2025-06-02 RX ADMIN — Medication 30 MILLILITER(S): at 11:28

## 2025-06-02 RX ADMIN — CEFTRIAXONE 100 MILLIGRAM(S): 500 INJECTION, POWDER, FOR SOLUTION INTRAMUSCULAR; INTRAVENOUS at 01:16

## 2025-06-02 RX ADMIN — Medication 250 MILLIGRAM(S): at 04:20

## 2025-06-02 RX ADMIN — Medication 250 MILLILITER(S): at 03:22

## 2025-06-02 RX ADMIN — SODIUM CHLORIDE 75 MILLILITER(S): 9 INJECTION, SOLUTION INTRAVENOUS at 10:35

## 2025-06-02 NOTE — CONSULT NOTE ADULT - ASSESSMENT
74M w/ CAD s/p PCI to the Cx in 2024, HTN, HLD and T2DM here for LLL PNA c/b LEIA on CKD      -cont DAPT asa and plavix   -cont losartan 100 mg qd   -cont pravastatin 80 mg qhs   -abx and treatment of PNA per primary team   -TTE ECHO inpt     Severo Celis MD [facility]  Attending Interventional Cardiologist, [place]-NS/MYRNA.   Avaliable on [employer] Team

## 2025-06-02 NOTE — ED ADULT NURSE REASSESSMENT NOTE - NS ED NURSE REASSESS COMMENT FT1
Dr Guevara and admission nurse in holding/[place], aware pt recent VSs, per Dr Guevara hold losartan due to LEIA and moniter VS for now
pt ambulatory O2 saturation remained 98%. pt denies sob, lightheadedness, chest pain.
Dr Guevara notified that pt chills and fever have improved however that the pt remains ST on moniter

## 2025-06-02 NOTE — PATIENT PROFILE ADULT - DO YOU EVER NEED HELP READING HOSPITAL MATERIALS?
Skilled Nursing Comprehensive visit completed. Shwetha present for [facility]. Alisa seemed to be feeling much better this week. She has 2 days left of Cephalexin to take. Vivek Wills lives with her daughter Dominguez Duron who is her primary caregiver. Cg uses inserts in the pull-ups. Dominguez Duron reports that this has helped but pt. still wants to get up several times at night. Dominguez Duron has a new CG that will be coming several times a week to sit with pt. She is now a full assist for transfers as Vivek Wills is able to bear wt. on left leg briefly. She does lean to the right if she is tired or doesn't feel well. PPS 30%. She is now totally dependent for all ADLs and transfers secondary to cerebral infarction with R sided hemiparesis and appropriately garbled speech. She can feed her self finger foods with her left hand only. Vts: T 98.6 Pulse 60 , B/P 123/62, LS clear. SEE ROS. Vivek Wills does likes sleeping in the hospital bed. Vivek Wills gets a bed bath now. Dominguez Duron is able to communicate with Vivek Wills very well. Vivek Wills is able to make her needs known with signals and some words. Patient has some short term memory loss, she listens intently to conversations and understands. Alisa was eating 90 % of an adult sized plate, now eats about 85% of a child's size plate. She drinks 1 Ensure daily as a supplement. Her was left MUAC 17.0 cm. She does show other signs of signs of weight loss like sunken cheeks,bitemporal wasting, loose fitting clothing, prominent clavicles, bony prominences. Medications must be crushed and put in food for ease of swallowing. Her diet consists of Regular foods cut up in tiny pieces, along with lots of fruits, yogurt. Occasional problems with constipation managed with Senna. Pain is well managed with Norco 5/325 2x day and Gabapentin. Instructed CG to try Ativan at HS. Reviewed with Dominguez Duron to use the meds more if her mom is upset. .Medications rec completed: No RF needed. Supplies needed: ordered. DME needed: None.  Dominguez Duron knows how to reach [facility] for any questions or concerns 24/7. Education provided to family regarding changes in condition, and disease progression patient no

## 2025-06-02 NOTE — ED PROVIDER NOTE - PHYSICAL EXAMINATION
INITIAL VITAL SIGNS: Reviewed by me.  GENERAL: In no acute distress.  HEAD: Normocephalic. Atraumatic.  EYES: EOMI. PERRL.  ENT: Moist mucous membranes. Oropharynx is clear.   NECK: Supple. No meningismus.   CV: Regular rate and rhythm. No murmurs, rubs, or gallops.  RESPIRATORY: Unlabored respirations. Clear to ascultation bilaterally.  ABDOMEN: Soft, non-distended, non-tender. No guarding or rebound.  BACK: No CVA tenderness.  EXTREMITIES: No deformities. No edema.   SKIN: Warm and dry. No rashes or petechiae.  NEURO: Grossly non-focal.

## 2025-06-02 NOTE — ED PROVIDER NOTE - ATTENDING CONTRIBUTION TO CARE
Attending MD Jackie Hernandez:  I personally have seen and examined this patient.  Resident note reviewed and agree on plan of care and except where noted.  See HPI, PE, and MDM for details.

## 2025-06-02 NOTE — ED PROVIDER NOTE - CLINICAL SUMMARY MEDICAL DECISION MAKING FREE TEXT BOX
74-year-old Male with history of CAD s/p stent, DM, HTN, HLD, who presents with generalized weakness for the past few days. Reports associated subjective fever, non productive cough, and left-sided chest pain. On exam, afebrile, no tachycardia, /58, saturating well on RA. CTAB. Benign abdomen. Considered PNA, viral illness. Sepsis workup ordered including labs, cultures, CXR. Initially treated with Tylenol PO. 74-year-old Male with history of CAD s/p stent, DM, HTN, HLD, who presents with generalized weakness for the past few days. Reports associated subjective fever, non productive cough, and left-sided chest pain. On exam, afebrile, no tachycardia, /58, saturating well on RA. CTAB. Benign abdomen. Considered PNA, viral illness. Sepsis workup ordered including labs, cultures, CXR. Initially treated with Tylenol PO.  Attending Jackie Hernandez: 73 yo male with multipole medical issues including h/o CAD, DM, HLD presentig with concern for weakness and fevers. pt sates having a cough with assocaited fevers for last couple of days. today increaed weakness. upon arrival pt awake and alert, pocus performed showig IVC with respiratory variation and a line predominant lung fields. less likely pulmonary edema. nonfocal neurologic exam makig itracrainal cause of weakness less likley. no evidence of encephalopathy. pt ranging neck. concern for possible PNA, vs viral infection. rosy lobtain labs, cultures, flu swab, cxr and re-eval

## 2025-06-02 NOTE — H&P ADULT - PROBLEM SELECTOR PLAN 3
recent ZHOU to LCX in 12/2024 recent ZHOU to LCX in 12/2024  - continue home meds except isosorbide  - Dr. Portillo from cardiology made aware recent ZHOU to LCX in 12/2024  - continue home meds   - Dr. Portillo from cardiology made aware

## 2025-06-02 NOTE — CONSULT NOTE ADULT - SUBJECTIVE AND OBJECTIVE BOX
[facility] Attending Consultation Note     Patient seen and evaluated at bedside    Chief Complaint: fever and cough     HPI:  74M hx CAD s/p ZHOU to Lcx (last in 12/2024), HTN, DM2, presents with fever, cough, weakness since last Friday. No recent travel or sick contact. Cough is nonproductive and associated with burning sensation in chest with exertion, denies SOB, diarrhea, difficulty with urination. Last bowel movement was 2 days ago. No current chest pain.  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 650 milliGRAM(s) Oral every 6 hours PRN  albuterol    0.083% 2.5 milliGRAM(s) Nebulizer every 8 hours  aluminum hydroxide/magnesium hydroxide/simethicone Suspension 30 milliLiter(s) Oral every 4 hours PRN  aspirin enteric coated 81 milliGRAM(s) Oral daily  azithromycin  IVPB 500 milliGRAM(s) IV Intermittent every 24 hours  cefTRIAXone   IVPB 1000 milliGRAM(s) IV Intermittent every 24 hours  clopidogrel Tablet 75 milliGRAM(s) Oral daily  dextrose 5%. 1000 milliLiter(s) IV Continuous <Continuous>  dextrose 5%. 1000 milliLiter(s) IV Continuous <Continuous>  dextrose 50% Injectable 25 Gram(s) IV Push once  dextrose 50% Injectable 12.5 Gram(s) IV Push once  dextrose 50% Injectable 25 Gram(s) IV Push once  dextrose Oral Gel 15 Gram(s) Oral once PRN  ezetimibe 10 milliGRAM(s) Oral daily  glucagon  Injectable 1 milliGRAM(s) IntraMuscular once  guaiFENesin  milliGRAM(s) Oral every 12 hours  insulin lispro (ADMELOG) corrective regimen sliding scale   SubCutaneous three times a day before meals  insulin lispro (ADMELOG) corrective regimen sliding scale   SubCutaneous at bedtime  lactated ringers. 1000 milliLiter(s) IV Continuous <Continuous>  melatonin 3 milliGRAM(s) Oral at bedtime PRN  ondansetron Injectable 4 milliGRAM(s) IV Push every 8 hours PRN  polyethylene glycol 3350 17 Gram(s) Oral daily  tamsulosin 0.4 milliGRAM(s) Oral at bedtime      FAMILY HISTORY:      Social History: Personally reviewed   No tobacco, EtOH or IVDU     REVIEW OF SYSTEMS:  Constitutional:     [x ] negative [ ] fevers [ ] chills [ ] weight loss [ ] weight gain  HEENT:                  [x ] negative [ ] dry eyes [ ] eye irritation [ ] postnasal drip [ ] nasal congestion  CV:                         [ x] negative  [ ] chest pain [ ] orthopnea [ ] palpitations [ ] murmur  Resp:                     [x ] negative [ ] cough [ ] shortness of breath [ ] dyspnea [ ] wheezing [ ] sputum [ ]hemoptysis  GI:                          [ x] negative [ ] nausea [ ] vomiting [ ] diarrhea [ ] constipation [ ] abd pain [ ] dysphagia   :                        [ x] negative [ ] dysuria [ ] nocturia [ ] hematuria [ ] increased urinary frequency  Musculoskeletal: [x ] negative [ ] back pain [ ] myalgias [ ] arthralgias [ ] fracture  Skin:                       [ x] negative [ ] rash [ ] itch  Neurological:        [ x] negative [ ] headache [ ] dizziness [ ] syncope [ ] weakness [ ] numbness  Psychiatric:           [ x] negative [ ] anxiety [ ] depression  Endocrine:            [ x] negative [ ] diabetes [ ] thyroid problem  Heme/Lymph:      [ x] negative [ ] anemia [ ] bleeding problem  Allergic/Immune: [ x] negative [ ] itchy eyes [ ] nasal discharge [ ] hives [ ] angioedema    [ x] All other systems negative  [ ] Unable to assess ROS due to      Physical Exam:  T(F): 100.4 (06-02), Max: 102.9 (06-02)  HR: 147 (06-02) (94 - 147)  BP: 159/104 (06-02) (105/64 - 188/84)  RR: 20 (06-02)  SpO2: 98% (06-02)    Gen: Well appearing   HENNT: No JVP   CV: regular rate, regular rhtyhm, no murmur   Pulm: clear to auscultation bilaterally   Abdomen: Non-tender, non-distended   Ext: no edema b/l   Neuro: grossly non-focal     Cardiovascular Diagnostic Testing:      Labs: Personally reviewed                        10.5   15.79 )-----------( 369      ( 01 Jun 2025 23:27 )             34.6     06-02    133[L]  |  101  |  29[H]  ----------------------------<  156[H]  4.3   |  18[L]  |  1.97[H]    Ca    9.3      02 Jun 2025 04:24    TPro  7.4  /  Alb  3.7  /  TBili  0.6  /  DBili  x   /  AST  28  /  ALT  23  /  AlkPhos  72  06-02    PT/INR - ( 01 Jun 2025 23:27 )   PT: 15.0 sec;   INR: 1.31 ratio         PTT - ( 01 Jun 2025 23:27 )  PTT:35.0 sec

## 2025-06-02 NOTE — H&P ADULT - PROBLEM SELECTOR PLAN 5
incidental finding of bilateral adrenal nodule on CT- patient informed of this and advised to follow up outpatient with PMD

## 2025-06-02 NOTE — H&P ADULT - PROBLEM SELECTOR PLAN 2
baseline serum cr ~ 1.2-1.4  serum cr on admission 2.15, remains stable on repeat  - continue with IVF  - monitor I/O baseline serum cr ~ 1.2-1.4  serum cr on admission 2.15, remains stable on repeat  - continue with IVF  - monitor I/O  - hold ARB baseline serum cr ~ 1.2-1.4  serum cr on admission 2.15, remains stable on repeat  - continue with IVF  - monitor I/O  - hold losartan

## 2025-06-02 NOTE — ED PROVIDER NOTE - OBJECTIVE STATEMENT
Attending Jackie Hernandez: 74-year-old male with multiple medical issues including history of coronary artery disease, diabetes, high blood pressure, hyperlipidemia, status post presenting with concern for generalized weakness and not feeling well.  Patient states last couple days has been having nonproductive cough and today developed weakness.  Also endorses having a fever at home.  No sick contacts.  Patient denies any abdominal pain.  No difficulty urinating.  No sick contacts.  No recent travel.  Patient denies any shortness of breath.  Was concerned with generalized weakness and feeling unsteady today so came to the emergency department.  No recent falls or trauma.  Denies any recent

## 2025-06-02 NOTE — ED PROVIDER NOTE - PROGRESS NOTE DETAILS
Labs showed leukocytosis at 15.79. No severe anemia. Creatinine 2.15, previous 1.42 on 12/2024. Hyperglycemia at 272. Procal elevated at 2.01. UA negative for infection.   CXR showed no consolidation. Given elevated procal and WBC as well as no clear source of infection, will cover with abx. CT chest, abd and pelvis ordered. Attending Jackie Hernandez: ct scan with evidence of pna. curb 65 of 3. will need admission

## 2025-06-02 NOTE — H&P ADULT - CONVERSATION DETAILS
Patient reports his spouse, Sanjana, is his HCP. At this time, he wishes for everything to be done and is full code.

## 2025-06-02 NOTE — H&P ADULT - PROBLEM SELECTOR PLAN 1
met severe sepsis criteria (fever, tachycardia with lactate of 2.1) due to PNA (LLL consolidation concerning for PNA on CT chest)  - elevated procalcitonin of 2.  - follow up blood cultures  - continue IV ceftriaxone and zithromax  - mucinex, albuterol neb  - IVF with LR at 75cc/hr met severe sepsis criteria (fever, tachycardia with lactate of 2.1) due to PNA (LLL consolidation concerning for PNA on CT chest)  - elevated procalcitonin of 2.  - follow up blood cultures  - continue IV ceftriaxone and zithromax  - mucinex, albuterol neb  - if persistent wheezing, consider trial of steroid  - IVF with LR at 75cc/hr

## 2025-06-02 NOTE — H&P ADULT - ASSESSMENT
74M hx CAD s/p ZHOU to Lcx (last in 12/2024), HTN, DM2, presents with fever, cough, weakness found to have severe sepsis due to LLL PNA, LEIA on suspected CKD 3.

## 2025-06-02 NOTE — H&P ADULT - HISTORY OF PRESENT ILLNESS
74M hx CAD s/p ZHOU to Lcx (last in 12/2024), HTN, DM2, presents with fever, cough, weakness since last Friday. No recent travel or sick contact. Cough is nonproductive and associated with burning sensation in chest with exertion, denies SOB, diarrhea, difficulty with urination. Last bowel movement was 2 days ago. No current chest pain.

## 2025-06-02 NOTE — PATIENT PROFILE ADULT - FALL HARM RISK - RISK INTERVENTIONS

## 2025-06-03 ENCOUNTER — RESULT REVIEW (OUTPATIENT)
Age: 75
End: 2025-06-03

## 2025-06-03 DIAGNOSIS — K59.00 CONSTIPATION, UNSPECIFIED: ICD-10-CM

## 2025-06-03 LAB
A1C WITH ESTIMATED AVERAGE GLUCOSE RESULT: 7.8 % — HIGH (ref 4–5.6)
ANION GAP SERPL CALC-SCNC: 13 MMOL/L — SIGNIFICANT CHANGE UP (ref 5–17)
BASOPHILS # BLD AUTO: 0.09 K/UL — SIGNIFICANT CHANGE UP (ref 0–0.2)
BASOPHILS NFR BLD AUTO: 0.9 % — SIGNIFICANT CHANGE UP (ref 0–2)
BUN SERPL-MCNC: 24 MG/DL — HIGH (ref 7–23)
CALCIUM SERPL-MCNC: 8.7 MG/DL — SIGNIFICANT CHANGE UP (ref 8.4–10.5)
CHLORIDE SERPL-SCNC: 101 MMOL/L — SIGNIFICANT CHANGE UP (ref 96–108)
CO2 SERPL-SCNC: 20 MMOL/L — LOW (ref 22–31)
CREAT SERPL-MCNC: 1.77 MG/DL — HIGH (ref 0.5–1.3)
EGFR: 39 ML/MIN/1.73M2 — LOW
EGFR: 39 ML/MIN/1.73M2 — LOW
EOSINOPHIL # BLD AUTO: 0 K/UL — SIGNIFICANT CHANGE UP (ref 0–0.5)
EOSINOPHIL NFR BLD AUTO: 0 % — SIGNIFICANT CHANGE UP (ref 0–6)
ESTIMATED AVERAGE GLUCOSE: 177 MG/DL — HIGH (ref 68–114)
GLUCOSE BLDC GLUCOMTR-MCNC: 106 MG/DL — HIGH (ref 70–99)
GLUCOSE BLDC GLUCOMTR-MCNC: 129 MG/DL — HIGH (ref 70–99)
GLUCOSE BLDC GLUCOMTR-MCNC: 206 MG/DL — HIGH (ref 70–99)
GLUCOSE BLDC GLUCOMTR-MCNC: 257 MG/DL — HIGH (ref 70–99)
GLUCOSE SERPL-MCNC: 154 MG/DL — HIGH (ref 70–99)
HCT VFR BLD CALC: 31.2 % — LOW (ref 39–50)
HGB BLD-MCNC: 9.5 G/DL — LOW (ref 13–17)
LEGIONELLA AG UR QL: POSITIVE
LYMPHOCYTES # BLD AUTO: 0.91 K/UL — LOW (ref 1–3.3)
LYMPHOCYTES # BLD AUTO: 9.6 % — LOW (ref 13–44)
MAGNESIUM SERPL-MCNC: 2.2 MG/DL — SIGNIFICANT CHANGE UP (ref 1.6–2.6)
MANUAL SMEAR VERIFICATION: SIGNIFICANT CHANGE UP
MCHC RBC-ENTMCNC: 23.1 PG — LOW (ref 27–34)
MCHC RBC-ENTMCNC: 30.4 G/DL — LOW (ref 32–36)
MCV RBC AUTO: 75.7 FL — LOW (ref 80–100)
MONOCYTES # BLD AUTO: 0.83 K/UL — SIGNIFICANT CHANGE UP (ref 0–0.9)
MONOCYTES NFR BLD AUTO: 8.8 % — SIGNIFICANT CHANGE UP (ref 2–14)
NEUTROPHILS # BLD AUTO: 7.64 K/UL — HIGH (ref 1.8–7.4)
NEUTROPHILS NFR BLD AUTO: 77.2 % — HIGH (ref 43–77)
NEUTS BAND # BLD: 3.5 % — SIGNIFICANT CHANGE UP (ref 0–8)
NEUTS BAND NFR BLD: 3.5 % — SIGNIFICANT CHANGE UP (ref 0–8)
PHOSPHATE SERPL-MCNC: 3.6 MG/DL — SIGNIFICANT CHANGE UP (ref 2.5–4.5)
PLAT MORPH BLD: NORMAL — SIGNIFICANT CHANGE UP
PLATELET # BLD AUTO: 336 K/UL — SIGNIFICANT CHANGE UP (ref 150–400)
POTASSIUM SERPL-MCNC: 4.4 MMOL/L — SIGNIFICANT CHANGE UP (ref 3.5–5.3)
POTASSIUM SERPL-SCNC: 4.4 MMOL/L — SIGNIFICANT CHANGE UP (ref 3.5–5.3)
RBC # BLD: 4.12 M/UL — LOW (ref 4.2–5.8)
RBC # FLD: 15.1 % — HIGH (ref 10.3–14.5)
RBC BLD AUTO: SIGNIFICANT CHANGE UP
S PNEUM AG UR QL: NEGATIVE — SIGNIFICANT CHANGE UP
SODIUM SERPL-SCNC: 134 MMOL/L — LOW (ref 135–145)
WBC # BLD: 9.47 K/UL — SIGNIFICANT CHANGE UP (ref 3.8–10.5)
WBC # FLD AUTO: 9.47 K/UL — SIGNIFICANT CHANGE UP (ref 3.8–10.5)

## 2025-06-03 PROCEDURE — 99233 SBSQ HOSP IP/OBS HIGH 50: CPT | Mod: GC

## 2025-06-03 PROCEDURE — 93306 TTE W/DOPPLER COMPLETE: CPT | Mod: 26

## 2025-06-03 RX ORDER — ACETAMINOPHEN 500 MG/5ML
1000 LIQUID (ML) ORAL ONCE
Refills: 0 | Status: COMPLETED | OUTPATIENT
Start: 2025-06-03 | End: 2025-06-03

## 2025-06-03 RX ORDER — ATORVASTATIN CALCIUM 80 MG/1
20 TABLET, FILM COATED ORAL AT BEDTIME
Refills: 0 | Status: DISCONTINUED | OUTPATIENT
Start: 2025-06-03 | End: 2025-06-05

## 2025-06-03 RX ORDER — BENZONATATE 100 MG
100 CAPSULE ORAL EVERY 8 HOURS
Refills: 0 | Status: DISCONTINUED | OUTPATIENT
Start: 2025-06-03 | End: 2025-06-05

## 2025-06-03 RX ORDER — SENNA 187 MG
2 TABLET ORAL AT BEDTIME
Refills: 0 | Status: DISCONTINUED | OUTPATIENT
Start: 2025-06-03 | End: 2025-06-05

## 2025-06-03 RX ADMIN — DEXTROMETHORPHAN HBR, GUAIFENESIN 600 MILLIGRAM(S): 200 LIQUID ORAL at 05:20

## 2025-06-03 RX ADMIN — EZETIMIBE 10 MILLIGRAM(S): 10 TABLET ORAL at 11:27

## 2025-06-03 RX ADMIN — Medication 100 MILLIGRAM(S): at 21:39

## 2025-06-03 RX ADMIN — Medication 2.5 MILLIGRAM(S): at 21:40

## 2025-06-03 RX ADMIN — Medication 650 MILLIGRAM(S): at 18:31

## 2025-06-03 RX ADMIN — Medication 100 MILLIGRAM(S): at 02:21

## 2025-06-03 RX ADMIN — Medication 2.5 MILLIGRAM(S): at 14:09

## 2025-06-03 RX ADMIN — CEFTRIAXONE 100 MILLIGRAM(S): 500 INJECTION, POWDER, FOR SOLUTION INTRAMUSCULAR; INTRAVENOUS at 01:17

## 2025-06-03 RX ADMIN — DEXTROMETHORPHAN HBR, GUAIFENESIN 600 MILLIGRAM(S): 200 LIQUID ORAL at 17:07

## 2025-06-03 RX ADMIN — Medication 2.5 MILLIGRAM(S): at 05:14

## 2025-06-03 RX ADMIN — Medication 81 MILLIGRAM(S): at 11:28

## 2025-06-03 RX ADMIN — Medication 2 TABLET(S): at 14:09

## 2025-06-03 RX ADMIN — CLOPIDOGREL BISULFATE 75 MILLIGRAM(S): 75 TABLET, FILM COATED ORAL at 11:28

## 2025-06-03 RX ADMIN — Medication 1000 MILLIGRAM(S): at 04:07

## 2025-06-03 RX ADMIN — ATORVASTATIN CALCIUM 20 MILLIGRAM(S): 80 TABLET, FILM COATED ORAL at 21:39

## 2025-06-03 RX ADMIN — Medication 250 MILLIGRAM(S): at 03:37

## 2025-06-03 RX ADMIN — TAMSULOSIN HYDROCHLORIDE 0.4 MILLIGRAM(S): 0.4 CAPSULE ORAL at 21:39

## 2025-06-03 RX ADMIN — Medication 100 MILLIGRAM(S): at 14:09

## 2025-06-03 RX ADMIN — INSULIN LISPRO 1: 100 INJECTION, SOLUTION INTRAVENOUS; SUBCUTANEOUS at 22:19

## 2025-06-03 RX ADMIN — INSULIN LISPRO 2: 100 INJECTION, SOLUTION INTRAVENOUS; SUBCUTANEOUS at 17:33

## 2025-06-03 RX ADMIN — Medication 400 MILLIGRAM(S): at 02:21

## 2025-06-03 RX ADMIN — Medication 650 MILLIGRAM(S): at 17:10

## 2025-06-03 NOTE — PROGRESS NOTE ADULT - SUBJECTIVE AND OBJECTIVE BOX
DATE OF SERVICE: 06-03-25 @ 22:08    Patient is a 74y old  Male who presents with a chief complaint of fever, cough, weakness, dizziness (03 Jun 2025 07:43)      INTERVAL HISTORY: feels ok    TELEMETRY Personally reviewed: no events  	    PHYSICAL EXAM:  T(C): 36.8 (06-03-25 @ 21:10), Max: 39.1 (06-03-25 @ 02:11)  HR: 107 (06-03-25 @ 21:10) (97 - 120)  BP: 107/64 (06-03-25 @ 21:10) (100/68 - 160/86)  RR: 18 (06-03-25 @ 21:10) (18 - 19)  SpO2: 98% (06-03-25 @ 21:10) (97% - 100%)  Wt(kg): --  I&O's Summary    02 Jun 2025 07:01  -  03 Jun 2025 07:00  --------------------------------------------------------  IN: 0 mL / OUT: 300 mL / NET: -300 mL    03 Jun 2025 07:01  -  03 Jun 2025 22:08  --------------------------------------------------------  IN: 0 mL / OUT: 800 mL / NET: -800 mL          Appearance: In no distress	  HEENT:    PERRL, EOMI	  Cardiovascular:  S1 S2, No JVD  Respiratory: Lungs clear to auscultation	  Gastrointestinal:  Soft, Non-tender, + BS	  Vascularature:  No edema of LE  Psychiatric: Appropriate affect   Neuro: no acute focal deficits                               9.5    9.47  )-----------( 336      ( 03 Jun 2025 06:47 )             31.2     06-03    134[L]  |  101  |  24[H]  ----------------------------<  154[H]  4.4   |  20[L]  |  1.77[H]    Ca    8.7      03 Jun 2025 06:46  Phos  3.6     06-03  Mg     2.2     06-03    TPro  7.4  /  Alb  3.7  /  TBili  0.6  /  DBili  x   /  AST  28  /  ALT  23  /  AlkPhos  72  06-02        Labs personally reviewed      ASSESSMENT/PLAN: 	    74M w/ CAD s/p PCI to the Cx in 2024, HTN, HLD and T2DM here for LLL PNA c/b LEIA on CKD      -cont DAPT asa and plavix   -cont losartan 100 mg qd   -cont pravastatin 80 mg qhs   -abx and treatment of PNA per primary team   -TTE unremarkable        Jose Portillo DO [facility]  Cardiovascular Medicine  [address] [place] [address], [address]  Office: [phone]  Available via Text/call on Microsoft Teams

## 2025-06-03 NOTE — PROGRESS NOTE ADULT - SUBJECTIVE AND OBJECTIVE BOX
PROGRESS NOTE:     Patient is a 74y old  Male who presents with a chief complaint of fever, cough (02 Jun 2025 18:40)      SUBJECTIVE / OVERNIGHT EVENTS:    OVERNIGHT: No acute overnight events.      Patient was examined at bedside and feels well this morning. Denies fever, chills, chest pain, SOB, nausea, vomiting. ROS otherwise negative and pt is amenable to current treatment plan.      REVIEW OF SYSTEMS:    CONSTITUTIONAL:  No weakness, fevers, or chills  EYES/ENT:  No visual changes, vertigo, or throat pain   NECK:  No pain or stiffness  RESPIRATORY:  No SOB, cough, wheezing, or hemoptysis  CARDIOVASCULAR:  No chest pain or palpitations  GASTROINTESTINAL:  No abdominal pain, nausea, vomiting, or hematemesis; no diarrhea, constipation, melena, or hematochezia  GENITOURINARY:  No dysuria, polyuria, or hematuria  NEUROLOGICAL:  No numbness or weakness  SKIN:  No itching or rashes      MEDICATIONS  (STANDING):  albuterol    0.083% 2.5 milliGRAM(s) Nebulizer every 8 hours  aspirin enteric coated 81 milliGRAM(s) Oral daily  azithromycin  IVPB 500 milliGRAM(s) IV Intermittent every 24 hours  benzonatate 100 milliGRAM(s) Oral every 8 hours  cefTRIAXone   IVPB 1000 milliGRAM(s) IV Intermittent every 24 hours  clopidogrel Tablet 75 milliGRAM(s) Oral daily  dextrose 5%. 1000 milliLiter(s) (100 mL/Hr) IV Continuous <Continuous>  dextrose 5%. 1000 milliLiter(s) (50 mL/Hr) IV Continuous <Continuous>  dextrose 50% Injectable 25 Gram(s) IV Push once  dextrose 50% Injectable 12.5 Gram(s) IV Push once  dextrose 50% Injectable 25 Gram(s) IV Push once  ezetimibe 10 milliGRAM(s) Oral daily  glucagon  Injectable 1 milliGRAM(s) IntraMuscular once  guaiFENesin  milliGRAM(s) Oral every 12 hours  insulin lispro (ADMELOG) corrective regimen sliding scale   SubCutaneous three times a day before meals  insulin lispro (ADMELOG) corrective regimen sliding scale   SubCutaneous at bedtime  lactated ringers. 1000 milliLiter(s) (75 mL/Hr) IV Continuous <Continuous>  polyethylene glycol 3350 17 Gram(s) Oral daily  tamsulosin 0.4 milliGRAM(s) Oral at bedtime    MEDICATIONS  (PRN):  acetaminophen     Tablet .. 650 milliGRAM(s) Oral every 6 hours PRN Temp greater or equal to 38C (100.4F), Mild Pain (1 - 3)  dextrose Oral Gel 15 Gram(s) Oral once PRN Blood Glucose LESS THAN 70 milliGRAM(s)/deciliter      CAPILLARY BLOOD GLUCOSE      POCT Blood Glucose.: 126 mg/dL (02 Jun 2025 21:36)  POCT Blood Glucose.: 202 mg/dL (02 Jun 2025 17:26)  POCT Blood Glucose.: 116 mg/dL (02 Jun 2025 09:54)    I&O's Summary    02 Jun 2025 07:01  -  03 Jun 2025 07:00  --------------------------------------------------------  IN: 0 mL / OUT: 300 mL / NET: -300 mL        PHYSICAL EXAM:  Vital Signs Last 24 Hrs  T(C): 37.2 (03 Jun 2025 05:11), Max: 39.4 (02 Jun 2025 10:30)  T(F): 99 (03 Jun 2025 05:11), Max: 102.9 (02 Jun 2025 10:30)  HR: 97 (03 Jun 2025 05:11) (94 - 147)  BP: 100/68 (03 Jun 2025 05:11) (99/65 - 188/84)  BP(mean): 120 (02 Jun 2025 15:30) (77 - 120)  RR: 19 (03 Jun 2025 05:11) (16 - 22)  SpO2: 99% (03 Jun 2025 05:11) (88% - 100%)    Parameters below as of 03 Jun 2025 05:11  Patient On (Oxygen Delivery Method): nasal cannula  O2 Flow (L/min): 2      CONSTITUTIONAL: NAD; well-developed  HEENT: PERRL, clear conjunctiva  RESPIRATORY: Normal respiratory effort; lungs are clear to auscultation bilaterally; No crackles/rhonchi/wheezing  CARDIOVASCULAR: Regular rate and rhythm, normal S1 and S2, no murmur/rub/gallop; No lower extremity edema; Peripheral pulses are 2+ bilaterally  ABDOMEN: Nontender to palpation, normoactive bowel sounds, no rebound/guarding; No hepatosplenomegaly  MUSCULOSKELETAL: No clubbing or cyanosis of digits; no joint swelling or tenderness to palpation  EXTREMITY: Lower extremities non-tender to palpation; non-erythematous B/L  NEURO: A&Ox3; no focal deficits   PSYCH: Normal mood; affect appropriate    LABS:                        9.5    9.47  )-----------( 336      ( 03 Jun 2025 06:47 )             31.2     06-03    134[L]  |  101  |  24[H]  ----------------------------<  154[H]  4.4   |  20[L]  |  1.77[H]    Ca    8.7      03 Jun 2025 06:46  Phos  3.6     06-03  Mg     2.2     06-03    TPro  7.4  /  Alb  3.7  /  TBili  0.6  /  DBili  x   /  AST  28  /  ALT  23  /  AlkPhos  72  06-02    PT/INR - ( 01 Jun 2025 23:27 )   PT: 15.0 sec;   INR: 1.31 ratio         PTT - ( 01 Jun 2025 23:27 )  PTT:35.0 sec  CARDIAC MARKERS ( 02 Jun 2025 04:24 )  x     / x     / x     / x     / 2.4 ng/mL      Urinalysis Basic - ( 03 Jun 2025 06:46 )    Color: x / Appearance: x / SG: x / pH: x  Gluc: 154 mg/dL / Ketone: x  / Bili: x / Urobili: x   Blood: x / Protein: x / Nitrite: x   Leuk Esterase: x / RBC: x / WBC x   Sq Epi: x / Non Sq Epi: x / Bacteria: x        Urinalysis with Rflx Culture (collected 01 Jun 2025 23:54)    Culture - Blood (collected 01 Jun 2025 23:10)  Source: Blood Blood-Peripheral  Preliminary Report (03 Jun 2025 02:02):    No growth at 24 hours    Culture - Blood (collected 01 Jun 2025 23:00)  Source: Blood Blood-Peripheral  Preliminary Report (03 Jun 2025 02:02):    No growth at 24 hours        RADIOLOGY & ADDITIONAL TESTS:    N/A PROGRESS NOTE:     Patient is a 74y old  Male who presents with a chief complaint of fever, cough (02 Jun 2025 18:40) on his 3rd day of hospitalization.      SUBJECTIVE / OVERNIGHT EVENTS:    OVERNIGHT: Patient felt overall well overnight but mentioned that they had a fever.     Patient was examined at bedside and feels well this morning. Denies fever, chills, chest pain, SOB, nausea, vomiting. ROS otherwise negative and pt is amenable to current treatment plan. Pt has not had a bowel movement since being admitted on Sunday night.      REVIEW OF SYSTEMS:    CONSTITUTIONAL:  No weakness, fevers, or chills. Loss of appetite noted in hospital  EYES/ENT:  No visual changes, vertigo, or throat pain   NECK:  No pain or stiffness  RESPIRATORY:  No SOB, cough, wheezing, or hemoptysis  CARDIOVASCULAR:  No chest pain or palpitations  GASTROINTESTINAL:  No abdominal pain, nausea, vomiting, or hematemesis; no diarrhea, constipation, melena, or hematochezia. Abdominal distention noted.  GENITOURINARY:  No dysuria, polyuria, or hematuria  NEUROLOGICAL:  No numbness or weakness  SKIN:  No itching or rashes      MEDICATIONS  (STANDING):  albuterol    0.083% 2.5 milliGRAM(s) Nebulizer every 8 hours  aspirin enteric coated 81 milliGRAM(s) Oral daily  azithromycin  IVPB 500 milliGRAM(s) IV Intermittent every 24 hours  benzonatate 100 milliGRAM(s) Oral every 8 hours  cefTRIAXone   IVPB 1000 milliGRAM(s) IV Intermittent every 24 hours  clopidogrel Tablet 75 milliGRAM(s) Oral daily  dextrose 5%. 1000 milliLiter(s) (100 mL/Hr) IV Continuous <Continuous>  dextrose 5%. 1000 milliLiter(s) (50 mL/Hr) IV Continuous <Continuous>  dextrose 50% Injectable 25 Gram(s) IV Push once  dextrose 50% Injectable 12.5 Gram(s) IV Push once  dextrose 50% Injectable 25 Gram(s) IV Push once  ezetimibe 10 milliGRAM(s) Oral daily  glucagon  Injectable 1 milliGRAM(s) IntraMuscular once  guaiFENesin  milliGRAM(s) Oral every 12 hours  insulin lispro (ADMELOG) corrective regimen sliding scale   SubCutaneous three times a day before meals  insulin lispro (ADMELOG) corrective regimen sliding scale   SubCutaneous at bedtime  lactated ringers. 1000 milliLiter(s) (75 mL/Hr) IV Continuous <Continuous>  polyethylene glycol 3350 17 Gram(s) Oral daily  tamsulosin 0.4 milliGRAM(s) Oral at bedtime    MEDICATIONS  (PRN):  acetaminophen     Tablet .. 650 milliGRAM(s) Oral every 6 hours PRN Temp greater or equal to 38C (100.4F), Mild Pain (1 - 3)  dextrose Oral Gel 15 Gram(s) Oral once PRN Blood Glucose LESS THAN 70 milliGRAM(s)/deciliter      CAPILLARY BLOOD GLUCOSE      POCT Blood Glucose.: 126 mg/dL (02 Jun 2025 21:36)  POCT Blood Glucose.: 202 mg/dL (02 Jun 2025 17:26)  POCT Blood Glucose.: 116 mg/dL (02 Jun 2025 09:54)    I&O's Summary    02 Jun 2025 07:01  -  03 Jun 2025 07:00  --------------------------------------------------------  IN: 0 mL / OUT: 300 mL / NET: -300 mL        PHYSICAL EXAM:  Vital Signs Last 24 Hrs  T(C): 37.2 (03 Jun 2025 05:11), Max: 39.4 (02 Jun 2025 10:30)  T(F): 99 (03 Jun 2025 05:11), Max: 102.9 (02 Jun 2025 10:30)  HR: 97 (03 Jun 2025 05:11) (94 - 147)  BP: 100/68 (03 Jun 2025 05:11) (99/65 - 188/84)  BP(mean): 120 (02 Jun 2025 15:30) (77 - 120)  RR: 19 (03 Jun 2025 05:11) (16 - 22)  SpO2: 99% (03 Jun 2025 05:11) (88% - 100%)    Parameters below as of 03 Jun 2025 05:11  Patient On (Oxygen Delivery Method): nasal cannula  O2 Flow (L/min): 2      CONSTITUTIONAL: NAD; well-developed,   HEENT: PERRL, clear conjunctiva. No lymphadenopathy, no JVD.  RESPIRATORY: Normal respiratory effort; lungs are clear to auscultation bilaterally; No crackles/rhonchi/wheezing.   CARDIOVASCULAR: Regular rate and rhythm, normal S1 and S2, no murmur/rub/gallop; No lower extremity edema; Peripheral pulses are 2+ bilaterally  ABDOMEN: Nontender to palpation, normoactive bowel sounds, no rebound/guarding; No hepatosplenomegaly. Abdominal distention  MUSCULOSKELETAL: No clubbing or cyanosis of digits; no joint swelling or tenderness to palpation  EXTREMITY: Lower extremities non-tender to palpation; non-erythematous B/L  NEURO: A&Ox3; no focal deficits   PSYCH: Normal mood; affect appropriate    LABS:                        9.5    9.47  )-----------( 336      ( 03 Jun 2025 06:47 )             31.2     06-03    134[L]  |  101  |  24[H]  ----------------------------<  154[H]  4.4   |  20[L]  |  1.77[H]    Ca    8.7      03 Jun 2025 06:46  Phos  3.6     06-03  Mg     2.2     06-03    TPro  7.4  /  Alb  3.7  /  TBili  0.6  /  DBili  x   /  AST  28  /  ALT  23  /  AlkPhos  72  06-02    PT/INR - ( 01 Jun 2025 23:27 )   PT: 15.0 sec;   INR: 1.31 ratio         PTT - ( 01 Jun 2025 23:27 )  PTT:35.0 sec  CARDIAC MARKERS ( 02 Jun 2025 04:24 )  x     / x     / x     / x     / 2.4 ng/mL      Urinalysis Basic - ( 03 Jun 2025 06:46 )    Color: x / Appearance: x / SG: x / pH: x  Gluc: 154 mg/dL / Ketone: x  / Bili: x / Urobili: x   Blood: x / Protein: x / Nitrite: x   Leuk Esterase: x / RBC: x / WBC x   Sq Epi: x / Non Sq Epi: x / Bacteria: x        Urinalysis with Rflx Culture (collected 01 Jun 2025 23:54)    Culture - Blood (collected 01 Jun 2025 23:10)  Source: Blood Blood-Peripheral  Preliminary Report (03 Jun 2025 02:02):    No growth at 24 hours    Culture - Blood (collected 01 Jun 2025 23:00)  Source: Blood Blood-Peripheral  Preliminary Report (03 Jun 2025 02:02):    No growth at 24 hours        RADIOLOGY & ADDITIONAL TESTS:  < from: CT Abdomen and Pelvis No Cont (06.02.25 @ 03:16) >  PROCEDURE DATE:  06/02/2025          INTERPRETATION:  CLINICAL INFORMATION: Fever, fall.    COMPARISON: Coronary CTA from 1/12/2023.    CONTRAST/COMPLICATIONS:  IV Contrast: None.  Oral Contrast: None.      PROCEDURE:  CT of the Chest, Abdomen and Pelvis was performed.  Sagittal and coronal reformats were performed.    FINDINGS:  CHEST:  LUNGS AND LARGE AIRWAYS: Patent central airways. Left lower lobe   consolidation. Elevated left hemidiaphragm.  PLEURA: Trace left pleural effusion.  VESSELS: Main pulmonary artery is not dilated. Thoracic aorta is normal   in caliber. Atherosclerotic calcifications of thethoracic aorta, great   vessels, and coronary arteries.  HEART: Heart size is normal. No pericardial effusion. Calcifications of   the aortic valve leaflets.  MEDIASTINUM AND TERA: Calcified subcarinal lymph nodes.  CHEST WALL AND LOWER NECK: Within normal limits.    ABDOMEN AND PELVIS:  LIVER: Within normal limits.  BILE DUCTS: Normal caliber.  GALLBLADDER: Within normal limits.  SPLEEN: Within normal limits.  PANCREAS: Within normal limits.  ADRENALS: Left adrenal myelolipoma and left adrenal nodule with   peripheral calcification measuring up to 2 cm, unchanged, presumably an   adenoma. Right adrenal nodule measuring up to 1 cm.  KIDNEYS/URETERS: No right or left hydroureteronephrosis or   nephrolithiasis. Mild symmetric bilateral perinephric stranding.    BLADDER: Mild circumferential thickening of the urinary bladder walls   likely due to chronic outlet obstruction.  REPRODUCTIVE ORGANS: Enlarged prostate gland with median hypertrophy.    BOWEL: No bowel obstruction. Appendix is normal. Moderate to large stool   burden in the rectosigmoid colon.  PERITONEUM/RETROPERITONEUM: No ascites or pneumoperitoneum.  VESSELS: Atherosclerotic calcifications of the aortoiliac tree. Normal   caliber abdominal aorta.  LYMPH NODES: No lymphadenopathy.  ABDOMINAL WALL: Small fat-containing left inguinal hernia. Trace right   hydrocele.  BONES: Degenerative changes of the spine. Spinal fusion hardware at T1-T2   and incompletely visualized laminectomies in the cervical spine.    IMPRESSION:  Left lower lobe lung consolidation compatible with a pneumonia. Trace   left pleural effusion.    No acute intra-abdominal or pelvic findings.    Left adrenal myelolipoma left adrenal nodule with peripheral   calcification measuring up to 2 cm, unchanged, presumably an adenoma.   Right adrenal nodule measuring up to 1 cm. Nonemergent MRI of the abdomen   with adrenal protocol may be considered for better characterization.    Moderate to large stool burden in the colon.    < end of copied text >

## 2025-06-03 NOTE — PROGRESS NOTE ADULT - ASSESSMENT
74M hx CAD s/p ZHOU to Lcx (last in 12/2024), HTN, DM2, presents with fever, cough, weakness found to have severe sepsis due to LLL PNA, LEIA on suspected CKD 3. 74M hx CAD s/p ZHOU to Lcx (last in 12/2024), HTN, DM2, presents with fever, cough, weakness found to have severe sepsis due to LLL PNA, LEIA on suspected CKD 3. On day 3 of hospitalization (admitted 6/1/25)

## 2025-06-03 NOTE — PROGRESS NOTE ADULT - PROBLEM SELECTOR PLAN 1
met severe sepsis criteria (fever, tachycardia with lactate of 2.1) due to PNA (LLL consolidation concerning for PNA on CT chest)  - elevated procalcitonin of 2.  - follow up blood cultures  - continue IV ceftriaxone and zithromax  - mucinex, albuterol neb  - if persistent wheezing, consider trial of steroid  - IVF with LR at 75cc/hr

## 2025-06-03 NOTE — PROGRESS NOTE ADULT - PROBLEM SELECTOR PLAN 3
recent ZHOU to LCX in 12/2024  - continue home meds   - Dr. Portillo from cardiology made aware recent ZHOU to LCX in 12/2024  - continue home meds (ASA 81, plavix)  - Dr. Portillo from cardiology made aware

## 2025-06-03 NOTE — PROGRESS NOTE ADULT - PROBLEM SELECTOR PLAN 2
baseline serum cr ~ 1.2-1.4  serum cr on admission 2.15, remains stable on repeat  - continue with IVF  - monitor I/O  - hold losartan

## 2025-06-04 DIAGNOSIS — R42 DIZZINESS AND GIDDINESS: ICD-10-CM

## 2025-06-04 LAB
ALBUMIN SERPL ELPH-MCNC: 2.9 G/DL — LOW (ref 3.3–5)
ALP SERPL-CCNC: 79 U/L — SIGNIFICANT CHANGE UP (ref 40–120)
ALT FLD-CCNC: 60 U/L — HIGH (ref 10–45)
ANION GAP SERPL CALC-SCNC: 15 MMOL/L — SIGNIFICANT CHANGE UP (ref 5–17)
AST SERPL-CCNC: 91 U/L — HIGH (ref 10–40)
BILIRUB SERPL-MCNC: 0.2 MG/DL — SIGNIFICANT CHANGE UP (ref 0.2–1.2)
BUN SERPL-MCNC: 22 MG/DL — SIGNIFICANT CHANGE UP (ref 7–23)
CALCIUM SERPL-MCNC: 8.6 MG/DL — SIGNIFICANT CHANGE UP (ref 8.4–10.5)
CHLORIDE SERPL-SCNC: 99 MMOL/L — SIGNIFICANT CHANGE UP (ref 96–108)
CO2 SERPL-SCNC: 18 MMOL/L — LOW (ref 22–31)
CREAT SERPL-MCNC: 1.62 MG/DL — HIGH (ref 0.5–1.3)
EGFR: 44 ML/MIN/1.73M2 — LOW
EGFR: 44 ML/MIN/1.73M2 — LOW
GLUCOSE BLDC GLUCOMTR-MCNC: 142 MG/DL — HIGH (ref 70–99)
GLUCOSE BLDC GLUCOMTR-MCNC: 158 MG/DL — HIGH (ref 70–99)
GLUCOSE BLDC GLUCOMTR-MCNC: 218 MG/DL — HIGH (ref 70–99)
GLUCOSE BLDC GLUCOMTR-MCNC: 234 MG/DL — HIGH (ref 70–99)
GLUCOSE SERPL-MCNC: 169 MG/DL — HIGH (ref 70–99)
HCT VFR BLD CALC: 30.7 % — LOW (ref 39–50)
HGB BLD-MCNC: 9.3 G/DL — LOW (ref 13–17)
MAGNESIUM SERPL-MCNC: 2.1 MG/DL — SIGNIFICANT CHANGE UP (ref 1.6–2.6)
MCHC RBC-ENTMCNC: 23 PG — LOW (ref 27–34)
MCHC RBC-ENTMCNC: 30.3 G/DL — LOW (ref 32–36)
MCV RBC AUTO: 75.8 FL — LOW (ref 80–100)
NRBC BLD AUTO-RTO: 0 /100 WBCS — SIGNIFICANT CHANGE UP (ref 0–0)
PHOSPHATE SERPL-MCNC: 2.8 MG/DL — SIGNIFICANT CHANGE UP (ref 2.5–4.5)
PLATELET # BLD AUTO: 379 K/UL — SIGNIFICANT CHANGE UP (ref 150–400)
POTASSIUM SERPL-MCNC: 4.1 MMOL/L — SIGNIFICANT CHANGE UP (ref 3.5–5.3)
POTASSIUM SERPL-SCNC: 4.1 MMOL/L — SIGNIFICANT CHANGE UP (ref 3.5–5.3)
PROT SERPL-MCNC: 6.1 G/DL — SIGNIFICANT CHANGE UP (ref 6–8.3)
RBC # BLD: 4.05 M/UL — LOW (ref 4.2–5.8)
RBC # FLD: 15.1 % — HIGH (ref 10.3–14.5)
SODIUM SERPL-SCNC: 132 MMOL/L — LOW (ref 135–145)
WBC # BLD: 7.6 K/UL — SIGNIFICANT CHANGE UP (ref 3.8–10.5)
WBC # FLD AUTO: 7.6 K/UL — SIGNIFICANT CHANGE UP (ref 3.8–10.5)

## 2025-06-04 PROCEDURE — 99223 1ST HOSP IP/OBS HIGH 75: CPT | Mod: GC

## 2025-06-04 PROCEDURE — 99233 SBSQ HOSP IP/OBS HIGH 50: CPT | Mod: GC

## 2025-06-04 PROCEDURE — G0545: CPT

## 2025-06-04 RX ORDER — IPRATROPIUM BROMIDE AND ALBUTEROL SULFATE .5; 2.5 MG/3ML; MG/3ML
3 SOLUTION RESPIRATORY (INHALATION) ONCE
Refills: 0 | Status: DISCONTINUED | OUTPATIENT
Start: 2025-06-04 | End: 2025-06-05

## 2025-06-04 RX ADMIN — DEXTROMETHORPHAN HBR, GUAIFENESIN 600 MILLIGRAM(S): 200 LIQUID ORAL at 17:07

## 2025-06-04 RX ADMIN — EZETIMIBE 10 MILLIGRAM(S): 10 TABLET ORAL at 11:19

## 2025-06-04 RX ADMIN — Medication 81 MILLIGRAM(S): at 11:19

## 2025-06-04 RX ADMIN — TAMSULOSIN HYDROCHLORIDE 0.4 MILLIGRAM(S): 0.4 CAPSULE ORAL at 21:46

## 2025-06-04 RX ADMIN — DEXTROMETHORPHAN HBR, GUAIFENESIN 600 MILLIGRAM(S): 200 LIQUID ORAL at 05:08

## 2025-06-04 RX ADMIN — Medication 100 MILLIGRAM(S): at 13:06

## 2025-06-04 RX ADMIN — Medication 100 MILLIGRAM(S): at 05:09

## 2025-06-04 RX ADMIN — INSULIN LISPRO 1: 100 INJECTION, SOLUTION INTRAVENOUS; SUBCUTANEOUS at 13:06

## 2025-06-04 RX ADMIN — CLOPIDOGREL BISULFATE 75 MILLIGRAM(S): 75 TABLET, FILM COATED ORAL at 11:20

## 2025-06-04 RX ADMIN — Medication 100 MILLIGRAM(S): at 21:47

## 2025-06-04 RX ADMIN — ATORVASTATIN CALCIUM 20 MILLIGRAM(S): 80 TABLET, FILM COATED ORAL at 21:46

## 2025-06-04 RX ADMIN — Medication 2.5 MILLIGRAM(S): at 05:08

## 2025-06-04 RX ADMIN — Medication 250 MILLIGRAM(S): at 02:46

## 2025-06-04 RX ADMIN — CEFTRIAXONE 100 MILLIGRAM(S): 500 INJECTION, POWDER, FOR SOLUTION INTRAMUSCULAR; INTRAVENOUS at 02:16

## 2025-06-04 RX ADMIN — INSULIN LISPRO 2: 100 INJECTION, SOLUTION INTRAVENOUS; SUBCUTANEOUS at 17:08

## 2025-06-04 NOTE — DIETITIAN INITIAL EVALUATION ADULT - OTHER CALCULATIONS
defer fluid needs to medical team discretion  Estimated protein-energy needs calculated using dosing weight with consideration for malnutrition

## 2025-06-04 NOTE — PHYSICAL THERAPY INITIAL EVALUATION ADULT - NSPTDISCHREC_GEN_A_CORE
Patient/Caregiver provided printed discharge information.
supervision & assist from family as needed/Home PT

## 2025-06-04 NOTE — DIETITIAN INITIAL EVALUATION ADULT - ORAL INTAKE PTA/DIET HISTORY
Pt reports having a "not great" appetite and decreased PO intake PTA for > 1 month. Follows limited carbohydrate, low sugar diet. Pt denies any known food allergies or intolerances. Pt reported vitamin B12, D3 supplementation at home. Denies any difficulty chewing/swallowing at this time.

## 2025-06-04 NOTE — DIETITIAN INITIAL EVALUATION ADULT - NSFNSGIIOFT_GEN_A_CORE
Pt denies nausea, vomiting, diarrhea, constipation. bowel movement recorded 6/4 per flowsheets, miralax and senna ordered

## 2025-06-04 NOTE — DIETITIAN INITIAL EVALUATION ADULT - ADD RECOMMEND
1. Continue Consistent Carbohydrate therapeutic diet restriction as tolerated.  2. Recommend Ensure Max once daily to provide 160 kcal, 30 grams protein, 1 gram sugar per 10 oz   3. Monitor PO intake, PO diet tolerance, skin, weight, nutrition related labs, GI function, goals of care   4. Malnutrition Sticker Placed in Chart

## 2025-06-04 NOTE — PROGRESS NOTE ADULT - SUBJECTIVE AND OBJECTIVE BOX
DATE OF SERVICE: 06-04-25 @ 14:49    Patient is a 74y old  Male who presents with a chief complaint of fever, cough    per H&P: "74M hx CAD s/p ZHOU to Lcx (last in 12/2024), HTN, DM2, presents with fever, cough, weakness since last Friday" (04 Jun 2025 12:59)      INTERVAL HISTORY: feels okay, OOB in chair     REVIEW OF SYSTEMS:  CONSTITUTIONAL: No weakness  EYES/ENT: No visual changes;  No throat pain   NECK: No pain or stiffness  RESPIRATORY: No cough, wheezing; No shortness of breath  CARDIOVASCULAR: No chest pain or palpitations  GASTROINTESTINAL: No abdominal  pain. No nausea, vomiting, or hematemesis  GENITOURINARY: No dysuria, frequency or hematuria  NEUROLOGICAL: No stroke like symptoms  SKIN: No rashes      	  MEDICATIONS:        PHYSICAL EXAM:  T(C): 37.1 (06-04-25 @ 13:41), Max: 38.5 (06-03-25 @ 17:13)  HR: 114 (06-04-25 @ 13:41) (107 - 120)  BP: 112/75 (06-04-25 @ 13:41) (107/64 - 133/78)  RR: 17 (06-04-25 @ 13:41) (17 - 18)  SpO2: 97% (06-04-25 @ 13:41) (95% - 98%)  Wt(kg): --  I&O's Summary    03 Jun 2025 07:01  -  04 Jun 2025 07:00  --------------------------------------------------------  IN: 0 mL / OUT: 1150 mL / NET: -1150 mL          Appearance: In no distress	  HEENT:    PERRL, EOMI	  Cardiovascular:  S1 S2, No JVD  Respiratory: Lungs clear to auscultation	  Gastrointestinal:  Soft, Non-tender, + BS	  Vascularature:  No edema of LE  Psychiatric: Appropriate affect   Neuro: no acute focal deficits                               9.3    7.60  )-----------( 379      ( 04 Jun 2025 06:37 )             30.7     06-04    132[L]  |  99  |  22  ----------------------------<  169[H]  4.1   |  18[L]  |  1.62[H]    Ca    8.6      04 Jun 2025 06:37  Phos  2.8     06-04  Mg     2.1     06-04    TPro  6.1  /  Alb  2.9[L]  /  TBili  0.2  /  DBili  x   /  AST  91[H]  /  ALT  60[H]  /  AlkPhos  79  06-04        Labs personally reviewed      ASSESSMENT/PLAN: 	    74M w/ CAD s/p PCI to the Cx in 2024, HTN, HLD and T2DM here for LLL PNA c/b LEIA on CKD      -cont DAPT asa and plavix   -cont losartan 100 mg qd   -cont pravastatin 80 mg qhs   -abx and treatment of PNA per primary team   -TTE unremarkable        Iolani Behrbom, AG-LAINE Portillo DO [facility]  Cardiovascular Medicine  [address] [place] [address], [address]  Available through call or text on [employer]  Office: [phone]

## 2025-06-04 NOTE — PHYSICAL THERAPY INITIAL EVALUATION ADULT - GENERAL OBSERVATIONS, REHAB EVAL
received OOB seated in chair, A&OX4, following commands, pleasant & eager to participate, a/w sepsis, PNA, BS reviewed.

## 2025-06-04 NOTE — PROGRESS NOTE ADULT - PROBLEM SELECTOR PLAN 2
baseline serum cr ~ 1.2-1.4  serum cr on admission 2.15, remains stable on repeat  - continue with IVF  - monitor I/O  - hold losartan baseline serum cr ~ 1.2-1.4  serum cr on admission 2.15, downtrending  - continue with IVF  - monitor I/O  - hold losartan

## 2025-06-04 NOTE — PROGRESS NOTE ADULT - PROBLEM SELECTOR PLAN 1
met severe sepsis criteria (fever, tachycardia with lactate of 2.1) due to PNA (LLL consolidation concerning for PNA on CT chest)  - elevated procalcitonin of 2.  - follow up blood cultures  - continue IV ceftriaxone and zithromax  - mucinex, albuterol neb  - if persistent wheezing, consider trial of steroid  - IVF with LR at 75cc/hr met severe sepsis criteria (fever, tachycardia with lactate of 2.1) due to PNA (LLL consolidation concerning for PNA on CT chest)  - elevated procalcitonin of 2.  - BCx neg at 48 hrs  - Ur legionella +   PLAN:  - ID c/s recommend c/w azithromax (7 day course), d/c ceftriaxone  - mucinex, albuterol neb  - if persistent wheezing, consider trial of steroid  - IVF with LR at 75cc/hr

## 2025-06-04 NOTE — PROGRESS NOTE ADULT - PROBLEM SELECTOR PLAN 3
recent ZHOU to LCX in 12/2024  - continue home meds (ASA 81, plavix)  - Dr. Portillo from cardiology made aware - pt complaining of dizziness when getting up from bed  - orthostatic VS negative  - TTE wnl  - CTM

## 2025-06-04 NOTE — DIETITIAN INITIAL EVALUATION ADULT - PERSON TAUGHT/METHOD
provided diet education on benefit of oral nutrition supplements to promote PO intake and weight stabilization. Discussed importance of adequate protein intake. Estimated good understanding of education provided. Pt agreeable to oral nutrition supplements. Pt was made aware RD remains available and he expressed understanding./patient instructed

## 2025-06-04 NOTE — PROGRESS NOTE ADULT - PROBLEM SELECTOR PLAN 5
incidental finding of bilateral adrenal nodule on CT- patient informed of this and advised to follow up outpatient with PMD last HbA1c 6.7 in 9/2024  - continue ISS and monitor FSG

## 2025-06-04 NOTE — CONSULT NOTE ADULT - ATTENDING COMMENTS
74M hx CAD s/p ZHOU to Lcx (last in 12/2024), HTN, DM2, presents with fever, cough, weakness    Assessment:  #Fever  #Legionella PNA      -Pt febrile with elevated WBC  -UA neg, RVP neg  -CT C/A/P showing Left lower lobe lung consolidation compatible with a pneumonia. Trace left pleural effusion. No acute intra-abdominal or pelvic findings. Left adrenal myelolipoma left adrenal nodule with peripheral calcification measuring up to 2 cm, unchanged, presumably an adenoma. Right adrenal nodule measuring up to 1 cm  -Urine legionella ag +, urine strep ag -  -Pt currently on Ceftriaxone and Azithromycin       Recommendation:  -Cont Azithromycin, plan for 7 days for legionella PNA  -D/C Ceftriaxone  -Cont to monitor fever and WBC curve    Will sign off, please call with questions.       Plan discussed with consulting team.     Diamante Cummins  Please contact through MS Teams   If no response or past 5 pm/weekend call [phone].

## 2025-06-04 NOTE — PHYSICAL THERAPY INITIAL EVALUATION ADULT - GAIT DEVIATIONS NOTED, PT EVAL
decreased oscar/increased time in double stance/decreased velocity of limb motion/decreased step length/decreased stride length/decreased weight-shifting ability

## 2025-06-04 NOTE — PROGRESS NOTE ADULT - ASSESSMENT
74M hx CAD s/p ZHOU to Lcx (last in 12/2024), HTN, DM2, presents with fever, cough, weakness found to have severe sepsis due to LLL PNA, LEIA on suspected CKD 3. On day 3 of hospitalization (admitted 6/1/25) 74M hx CAD s/p ZHOU to Lcx (last in 12/2024), HTN, DM2, presents with fever, cough, weakness found to have severe sepsis due to LLL PNA 2/2 legionella

## 2025-06-04 NOTE — PHYSICAL THERAPY INITIAL EVALUATION ADULT - NSPTDMEREC_GEN_A_CORE
Pt will require a rolling walker due to dx of   sepsis/PNA    to help complete MRADLS's./rolling walker

## 2025-06-04 NOTE — DIETITIAN INITIAL EVALUATION ADULT - PERTINENT LABORATORY DATA
06-04    132[L]  |  99  |  22  ----------------------------<  169[H]  4.1   |  18[L]  |  1.62[H]    Ca    8.6      04 Jun 2025 06:37  Phos  2.8     06-04  Mg     2.1     06-04    TPro  6.1  /  Alb  2.9[L]  /  TBili  0.2  /  DBili  x   /  AST  91[H]  /  ALT  60[H]  /  AlkPhos  79  06-04  POCT Blood Glucose.: 158 mg/dL (06-04-25 @ 12:55)  A1C with Estimated Average Glucose Result: 7.8 % (06-03-25 @ 06:47)  A1C with Estimated Average Glucose Result: 6.7 % (09-17-24 @ 11:18)

## 2025-06-04 NOTE — CONSULT NOTE ADULT - SUBJECTIVE AND OBJECTIVE BOX
Patient is a 74y old  Male who presents with a chief complaint of fever, cough (04 Jun 2025 07:57)    HPI:  74M hx CAD s/p ZHOU to Lcx (last in 12/2024), HTN, DM2, presents with fever, cough, weakness. No recent travel or sick contact. Cough is nonproductive and associated with burning sensation in chest with exertion, denies SOB, diarrhea, difficulty with urination. Last bowel movement was 2 days ago. No current chest pain.  (02 Jun 2025 10:07)       prior hospital charts reviewed [  ]  primary team notes reviewed [  ]  other consultant notes reviewed [  ]    PAST MEDICAL & SURGICAL HISTORY:  HTN (hypertension)      Dyslipidemia      Diabetes mellitus      Cervical cord compression with myelopathy      CAD (coronary artery disease)      Ossification of posterior longitudinal ligament in cervical region      History of coronary artery stent placement          Allergies  No Known Allergies    ANTIMICROBIALS (past 90 days)  MEDICATIONS  (STANDING):  azithromycin  IVPB   250 mL/Hr IV Intermittent (06-04-25 @ 02:46)   250 mL/Hr IV Intermittent (06-03-25 @ 03:37)    azithromycin  IVPB   250 mL/Hr IV Intermittent (06-02-25 @ 04:20)    cefTRIAXone   IVPB   100 mL/Hr IV Intermittent (06-04-25 @ 02:16)   100 mL/Hr IV Intermittent (06-03-25 @ 01:17)    cefTRIAXone   IVPB   100 mL/Hr IV Intermittent (06-02-25 @ 01:16)        azithromycin  IVPB 500 every 24 hours  cefTRIAXone   IVPB 1000 every 24 hours    MEDICATIONS  (STANDING):  acetaminophen     Tablet .. 650 every 6 hours PRN  albuterol    0.083% 2.5 every 8 hours  aspirin enteric coated 81 daily  atorvastatin 20 at bedtime  benzonatate 100 every 8 hours  clopidogrel Tablet 75 daily  dextrose 50% Injectable 25 once  dextrose 50% Injectable 12.5 once  dextrose 50% Injectable 25 once  dextrose Oral Gel 15 once PRN  ezetimibe 10 daily  glucagon  Injectable 1 once  guaiFENesin  every 12 hours  insulin lispro (ADMELOG) corrective regimen sliding scale  three times a day before meals  insulin lispro (ADMELOG) corrective regimen sliding scale  at bedtime  polyethylene glycol 3350 17 daily  senna 2 at bedtime  tamsulosin 0.4 at bedtime    SOCIAL HISTORY:       FAMILY HISTORY:    REVIEW OF SYSTEMS  [  ] ROS unobtainable because:    [  ] All other systems negative except as noted below:	    Constitutional:  [ ] fever [ ] chills  [ ] weight loss  [ ] weakness  Skin:  [ ] rash [ ] phlebitis	  Eyes: [ ] icterus [ ] pain  [ ] discharge	  ENMT: [ ] sore throat  [ ] thrush [ ] ulcers [ ] exudates  Respiratory: [ ] dyspnea [ ] hemoptysis [ ] cough [ ] sputum	  Cardiovascular:  [ ] chest pain [ ] palpitations [ ] edema	  Gastrointestinal:  [ ] nausea [ ] vomiting [ ] diarrhea [ ] constipation [ ] pain	  Genitourinary:  [ ] dysuria [ ] frequency [ ] hematuria [ ] discharge [ ] flank pain  [ ] incontinence  Musculoskeletal:  [ ] myalgias [ ] arthralgias [ ] arthritis  [ ] back pain  Neurological:  [ ] headache [ ] seizures  [ ] confusion/altered mental status  Psychiatric:  [ ] anxiety [ ] depression	  Hematology/Lymphatics:  [ ] lymphadenopathy  Endocrine:  [ ] adrenal [ ] thyroid  Allergic/Immunologic:	 [ ] transplant [ ] seasonal    Vital Signs Last 24 Hrs  T(F): 99.8 (06-04-25 @ 05:00), Max: 102.9 (06-02-25 @ 10:30)  Vital Signs Last 24 Hrs  HR: 120 (06-04-25 @ 09:44) (107 - 120)  BP: 133/76 (06-04-25 @ 09:44) (107/64 - 160/86)  RR: 18 (06-04-25 @ 05:00)  SpO2: 97% (06-04-25 @ 09:44) (95% - 98%)  Wt(kg): --    PHYSICAL EXAM:  Constitutional: non-toxic, no distress  HEAD/EYES: anicteric, no conjunctival injection  ENT:  supple, no thrush  Cardiovascular:   normal S1, S2, no murmur, no edema  Respiratory:  clear BS bilaterally, no wheezes, no rales  GI:  soft, non-tender, normal bowel sounds  :  no holcomb, no CVA tenderness  Musculoskeletal:  no synovitis, normal ROM  Neurologic: awake and alert, normal strength, no focal findings  Skin:  no rash, no erythema, no phlebitis  Heme/Onc: no lymphadenopathy   Psychiatric:  awake, alert, appropriate mood                            9.3    7.60  )-----------( 379      ( 04 Jun 2025 06:37 )             30.7   06-04    132[L]  |  99  |  22  ----------------------------<  169[H]  4.1   |  18[L]  |  1.62[H]    Ca    8.6      04 Jun 2025 06:37  Phos  2.8     06-04  Mg     2.1     06-04    TPro  6.1  /  Alb  2.9[L]  /  TBili  0.2  /  DBili  x   /  AST  91[H]  /  ALT  60[H]  /  AlkPhos  79  06-04    Urinalysis with Rflx Culture (06.01.25 @ 23:54)    Urine Appearance: Clear   Color: Yellow   Specific Gravity: >1.030   pH Urine: 5.5   Protein, Urine: 100 mg/dL   Glucose Qualitative, Urine: >=1000 mg/dL   Ketone , Urine: Trace mg/dL   Blood, Urine: Trace   Bilirubin: Negative   Urobilinogen: 1.0 mg/dL   Leukocyte Esterase Concentration: Negative   Nitrite: Negative    Urine Microscopic-Add On (NC) (06.01.25 @ 23:54)    White Blood Cell - Urine: 5 /HPF   Red Blood Cell - Urine: 0 /HPF   Bacteria: Negative /HPF   Cast: 2 /LPF   Epithelial Cells: 2 /HPF        MICROBIOLOGY:  Urinalysis with Rflx Culture (collected 01 Jun 2025 23:54)    Culture - Blood (collected 01 Jun 2025 23:10)  Source: Blood Blood-Peripheral  Preliminary Report (04 Jun 2025 02:02):    No growth at 48 Hours    Culture - Blood (collected 01 Jun 2025 23:00)  Source: Blood Blood-Peripheral  Preliminary Report (04 Jun 2025 02:02):    No growth at 48 Hours                  RADIOLOGY:  imaging below personally reviewed and agree with findings    < from: CT Abdomen and Pelvis No Cont (06.02.25 @ 03:16) >    IMPRESSION:  Left lower lobe lung consolidation compatible with a pneumonia. Trace   left pleural effusion.    No acute intra-abdominal or pelvic findings.    Left adrenal myelolipoma left adrenal nodule with peripheral   calcification measuring up to 2 cm, unchanged, presumably an adenoma.   Right adrenal nodule measuring up to 1 cm. Nonemergent MRI of the abdomen   with adrenal protocol may be considered for better characterization.    Moderate to large stool burden in the colon.   Patient is a 74y old  Male who presents with a chief complaint of fever, cough (04 Jun 2025 07:57)    HPI:  74M hx CAD s/p ZHOU to Lcx (last in 12/2024), HTN, DM2, presents with fever, cough, weakness. Pt having dry cough, denies any N/V or diarrhea. No sick contacts     PAST MEDICAL & SURGICAL HISTORY:  HTN (hypertension)      Dyslipidemia      Diabetes mellitus      Cervical cord compression with myelopathy      CAD (coronary artery disease)      Ossification of posterior longitudinal ligament in cervical region      History of coronary artery stent placement          Allergies  No Known Allergies    ANTIMICROBIALS (past 90 days)  MEDICATIONS  (STANDING):  azithromycin  IVPB   250 mL/Hr IV Intermittent (06-04-25 @ 02:46)   250 mL/Hr IV Intermittent (06-03-25 @ 03:37)    azithromycin  IVPB   250 mL/Hr IV Intermittent (06-02-25 @ 04:20)    cefTRIAXone   IVPB   100 mL/Hr IV Intermittent (06-04-25 @ 02:16)   100 mL/Hr IV Intermittent (06-03-25 @ 01:17)    cefTRIAXone   IVPB   100 mL/Hr IV Intermittent (06-02-25 @ 01:16)        azithromycin  IVPB 500 every 24 hours  cefTRIAXone   IVPB 1000 every 24 hours    MEDICATIONS  (STANDING):  acetaminophen     Tablet .. 650 every 6 hours PRN  albuterol    0.083% 2.5 every 8 hours  aspirin enteric coated 81 daily  atorvastatin 20 at bedtime  benzonatate 100 every 8 hours  clopidogrel Tablet 75 daily  dextrose 50% Injectable 25 once  dextrose 50% Injectable 12.5 once  dextrose 50% Injectable 25 once  dextrose Oral Gel 15 once PRN  ezetimibe 10 daily  glucagon  Injectable 1 once  guaiFENesin  every 12 hours  insulin lispro (ADMELOG) corrective regimen sliding scale  three times a day before meals  insulin lispro (ADMELOG) corrective regimen sliding scale  at bedtime  polyethylene glycol 3350 17 daily  senna 2 at bedtime  tamsulosin 0.4 at bedtime    SOCIAL HISTORY: Denies any smoking     FAMILY HISTORY: No pertinent family hx     REVIEW OF SYSTEMS:    CONSTITUTIONAL: +weakness. + fevers   EYES:  No visual changes, no eye pain   ENT: No vertigo or throat pain   NECK: No pain or stiffness  RESPIRATORY: + cough. No wheezing, hemoptysis; + shortness of breath  CARDIOVASCULAR: No chest pain or palpitations  GASTROINTESTINAL: No abdominal or epigastric pain. No nausea, vomiting, or hematemesis; No diarrhea or constipation. No melena or hematochezia.  GENITOURINARY: No dysuria, frequency or hematuria  NEUROLOGICAL: No numbness or weakness  SKIN: No itching, rashes  Psych: no anxiety or depression     Vital Signs Last 24 Hrs  T(F): 99.8 (06-04-25 @ 05:00), Max: 102.9 (06-02-25 @ 10:30)  Vital Signs Last 24 Hrs  HR: 120 (06-04-25 @ 09:44) (107 - 120)  BP: 133/76 (06-04-25 @ 09:44) (107/64 - 160/86)  RR: 18 (06-04-25 @ 05:00)  SpO2: 97% (06-04-25 @ 09:44) (95% - 98%)  Wt(kg): --    PHYSICAL EXAM:  Constitutional: non-toxic, no distress  HEAD/EYES: anicteric, no conjunctival injection  ENT:  supple, no thrush  Cardiovascular:   normal S1, S2, no murmur, no edema  Respiratory:  clear BS bilaterally, no wheezes, no rales  GI:  soft, non-tender, normal bowel sounds  Neurologic: awake and alert  Skin:  no rash, no erythema, no phlebitis                            9.3    7.60  )-----------( 379      ( 04 Jun 2025 06:37 )             30.7   06-04    132[L]  |  99  |  22  ----------------------------<  169[H]  4.1   |  18[L]  |  1.62[H]    Ca    8.6      04 Jun 2025 06:37  Phos  2.8     06-04  Mg     2.1     06-04    TPro  6.1  /  Alb  2.9[L]  /  TBili  0.2  /  DBili  x   /  AST  91[H]  /  ALT  60[H]  /  AlkPhos  79  06-04    Urinalysis with Rflx Culture (06.01.25 @ 23:54)    Urine Appearance: Clear   Color: Yellow   Specific Gravity: >1.030   pH Urine: 5.5   Protein, Urine: 100 mg/dL   Glucose Qualitative, Urine: >=1000 mg/dL   Ketone , Urine: Trace mg/dL   Blood, Urine: Trace   Bilirubin: Negative   Urobilinogen: 1.0 mg/dL   Leukocyte Esterase Concentration: Negative   Nitrite: Negative    Urine Microscopic-Add On (NC) (06.01.25 @ 23:54)    White Blood Cell - Urine: 5 /HPF   Red Blood Cell - Urine: 0 /HPF   Bacteria: Negative /HPF   Cast: 2 /LPF   Epithelial Cells: 2 /HPF        MICROBIOLOGY:  Urinalysis with Rflx Culture (collected 01 Jun 2025 23:54)    Culture - Blood (collected 01 Jun 2025 23:10)  Source: Blood Blood-Peripheral  Preliminary Report (04 Jun 2025 02:02):    No growth at 48 Hours    Culture - Blood (collected 01 Jun 2025 23:00)  Source: Blood Blood-Peripheral  Preliminary Report (04 Jun 2025 02:02):    No growth at 48 Hours                  RADIOLOGY:  imaging below personally reviewed and agree with findings    < from: CT Abdomen and Pelvis No Cont (06.02.25 @ 03:16) >    IMPRESSION:  Left lower lobe lung consolidation compatible with a pneumonia. Trace   left pleural effusion.    No acute intra-abdominal or pelvic findings.    Left adrenal myelolipoma left adrenal nodule with peripheral   calcification measuring up to 2 cm, unchanged, presumably an adenoma.   Right adrenal nodule measuring up to 1 cm. Nonemergent MRI of the abdomen   with adrenal protocol may be considered for better characterization.    Moderate to large stool burden in the colon.   Patient is a 74y old  Male who presents with a chief complaint of fever, cough (04 Jun 2025 07:57)    HPI:  74M hx CAD s/p ZHOU to Lcx (last in 12/2024), HTN, DM2, presents with fever, cough, weakness. Pt having dry cough, denies any N/V or diarrhea. No sick contacts, feeling better.     PAST MEDICAL & SURGICAL HISTORY:  HTN (hypertension)      Dyslipidemia      Diabetes mellitus      Cervical cord compression with myelopathy      CAD (coronary artery disease)      Ossification of posterior longitudinal ligament in cervical region      History of coronary artery stent placement          Allergies  No Known Allergies    ANTIMICROBIALS (past 90 days)  MEDICATIONS  (STANDING):  azithromycin  IVPB   250 mL/Hr IV Intermittent (06-04-25 @ 02:46)   250 mL/Hr IV Intermittent (06-03-25 @ 03:37)    azithromycin  IVPB   250 mL/Hr IV Intermittent (06-02-25 @ 04:20)    cefTRIAXone   IVPB   100 mL/Hr IV Intermittent (06-04-25 @ 02:16)   100 mL/Hr IV Intermittent (06-03-25 @ 01:17)    cefTRIAXone   IVPB   100 mL/Hr IV Intermittent (06-02-25 @ 01:16)        azithromycin  IVPB 500 every 24 hours  cefTRIAXone   IVPB 1000 every 24 hours    MEDICATIONS  (STANDING):  acetaminophen     Tablet .. 650 every 6 hours PRN  albuterol    0.083% 2.5 every 8 hours  aspirin enteric coated 81 daily  atorvastatin 20 at bedtime  benzonatate 100 every 8 hours  clopidogrel Tablet 75 daily  dextrose 50% Injectable 25 once  dextrose 50% Injectable 12.5 once  dextrose 50% Injectable 25 once  dextrose Oral Gel 15 once PRN  ezetimibe 10 daily  glucagon  Injectable 1 once  guaiFENesin  every 12 hours  insulin lispro (ADMELOG) corrective regimen sliding scale  three times a day before meals  insulin lispro (ADMELOG) corrective regimen sliding scale  at bedtime  polyethylene glycol 3350 17 daily  senna 2 at bedtime  tamsulosin 0.4 at bedtime        SOCIAL HISTORY:   Denies any smoking, lives with family.         FAMILY HISTORY:   No pertinent family hx           REVIEW OF SYSTEMS:    CONSTITUTIONAL: +weakness. + fevers   EYES:  No eye pain   ENT: No throat pain   NECK: No pain  RESPIRATORY: + cough. + shortness of breath  CARDIOVASCULAR: No chest pain   GASTROINTESTINAL: No abdominal  pain. No nausea, vomiting,   GENITOURINARY: No dysuria,  NEUROLOGICAL: No focal weakness  SKIN: No rashes        Vital Signs Last 24 Hrs  T(F): 99.8 (06-04-25 @ 05:00), Max: 102.9 (06-02-25 @ 10:30)  Vital Signs Last 24 Hrs  HR: 120 (06-04-25 @ 09:44) (107 - 120)  BP: 133/76 (06-04-25 @ 09:44) (107/64 - 160/86)  RR: 18 (06-04-25 @ 05:00)  SpO2: 97% (06-04-25 @ 09:44) (95% - 98%)  Wt(kg): --        PHYSICAL EXAM:  Constitutional: non-toxic, no distress  HEAD/EYES: anicteric, no conjunctival injection  ENT:  supple,   Cardiovascular:   normal S1, S2,   Respiratory: + air entry b/l   GI:  soft, non-tender, normal bowel sounds  : No holcomb   Neurologic: awake and alert  Extremities: No edema   Vascular: peripheral pulses.   Skin:  no rash,                             9.3    7.60  )-----------( 379      ( 04 Jun 2025 06:37 )             30.7   06-04    132[L]  |  99  |  22  ----------------------------<  169[H]  4.1   |  18[L]  |  1.62[H]    Ca    8.6      04 Jun 2025 06:37  Phos  2.8     06-04  Mg     2.1     06-04    TPro  6.1  /  Alb  2.9[L]  /  TBili  0.2  /  DBili  x   /  AST  91[H]  /  ALT  60[H]  /  AlkPhos  79  06-04    Urinalysis with Rflx Culture (06.01.25 @ 23:54)    Urine Appearance: Clear   Color: Yellow   Specific Gravity: >1.030   pH Urine: 5.5   Protein, Urine: 100 mg/dL   Glucose Qualitative, Urine: >=1000 mg/dL   Ketone , Urine: Trace mg/dL   Blood, Urine: Trace   Bilirubin: Negative   Urobilinogen: 1.0 mg/dL   Leukocyte Esterase Concentration: Negative   Nitrite: Negative    Urine Microscopic-Add On (NC) (06.01.25 @ 23:54)    White Blood Cell - Urine: 5 /HPF   Red Blood Cell - Urine: 0 /HPF   Bacteria: Negative /HPF   Cast: 2 /LPF   Epithelial Cells: 2 /HPF        MICROBIOLOGY:  Urinalysis with Rflx Culture (collected 01 Jun 2025 23:54)    Culture - Blood (collected 01 Jun 2025 23:10)  Source: Blood Blood-Peripheral  Preliminary Report (04 Jun 2025 02:02):    No growth at 48 Hours    Culture - Blood (collected 01 Jun 2025 23:00)  Source: Blood Blood-Peripheral  Preliminary Report (04 Jun 2025 02:02):    No growth at 48 Hours          RADIOLOGY:  imaging below personally reviewed and agree with findings    < from: CT Abdomen and Pelvis No Cont (06.02.25 @ 03:16) >    IMPRESSION:  Left lower lobe lung consolidation compatible with a pneumonia. Trace   left pleural effusion.    No acute intra-abdominal or pelvic findings.    Left adrenal myelolipoma left adrenal nodule with peripheral   calcification measuring up to 2 cm, unchanged, presumably an adenoma.   Right adrenal nodule measuring up to 1 cm. Nonemergent MRI of the abdomen   with adrenal protocol may be considered for better characterization.    Moderate to large stool burden in the colon.

## 2025-06-04 NOTE — DIETITIAN INITIAL EVALUATION ADULT - REASON FOR ADMISSION
fever, cough    per H&P: "74M hx CAD s/p ZHOU to Lcx (last in 12/2024), HTN, DM2, presents with fever, cough, weakness since last Friday"

## 2025-06-04 NOTE — DIETITIAN INITIAL EVALUATION ADULT - REASON INDICATOR FOR ASSESSMENT
RD consult warranted for MST score 2 or greater   Source: Patient, Electronic Medical Record  Chart reviewed, events noted.

## 2025-06-04 NOTE — DIETITIAN INITIAL EVALUATION ADULT - OTHER INFO
severe sepsis, LEIA on CKD per chart   IV antibiotics noted      Weights:  - UBW (per patient): 150 pounds a few months ago, Pt reported ~10 pound weight loss over months and current weight ~140 pounds. Reflects 6.7% weight loss over months.  - Dosing Weight (per chart): 145 pounds (6/1)   -  pounds +/- 10%   - Per [facility] HIE: 142 pounds (4/18/25), 137 pounds (3/3/25), 141 pounds (12/26/24)  RD to continue to monitor weights and trends as able.

## 2025-06-04 NOTE — PROGRESS NOTE ADULT - PROBLEM SELECTOR PLAN 4
last HbA1c 6.7 in 9/2024  - continue ISS and monitor FSG recent ZHOU to LCX in 12/2024  - continue home meds (ASA 81, plavix)  - Dr. Portillo from cardiology made aware

## 2025-06-04 NOTE — DIETITIAN INITIAL EVALUATION ADULT - NSFNSADHERENCEPTAFT_GEN_A_CORE
Pt endorsed PMH DM, metformin noted per home medication list.   fingersticks reviewed in house, range: 106-257 mg/dL (6/2-6/4)   A1c 7.8% (6/3), insulin ordered in house

## 2025-06-04 NOTE — CONSULT NOTE ADULT - ASSESSMENT
74M hx CAD s/p ZHOU to Lcx (last in 12/2024), HTN, DM2, presents with fever, cough, weakness    Assessment:  #Fever  -Pt febrile with elevated WBC  -UA neg, RVP neg  -CT C/A/P showing Left lower lobe lung consolidation compatible with a pneumonia. Trace left pleural effusion. No acute intra-abdominal or pelvic findings. Left adrenal myelolipoma left adrenal nodule with peripheral calcification measuring up to 2 cm, unchanged, presumably an adenoma. Right adrenal nodule measuring up to 1 cm  -Urine legionella ag +, urine strep ag -  -Pt currently on Ceftriaxone and Azithromycin     Recommendation:   74M hx CAD s/p ZHOU to Lcx (last in 12/2024), HTN, DM2, presents with fever, cough, weakness    Assessment:  #Fever  #Legionella PNA  -Pt febrile with elevated WBC  -UA neg, RVP neg  -CT C/A/P showing Left lower lobe lung consolidation compatible with a pneumonia. Trace left pleural effusion. No acute intra-abdominal or pelvic findings. Left adrenal myelolipoma left adrenal nodule with peripheral calcification measuring up to 2 cm, unchanged, presumably an adenoma. Right adrenal nodule measuring up to 1 cm  -Urine legionella ag +, urine strep ag -  -Pt currently on Ceftriaxone and Azithromycin     Recommendation:  -Cont Azithromycin, plan for 7 days for legionella PNA  -D/C Ceftriaxone  -Cont to monitor fever and WBC curve    Angelique Coker  ID Fellow

## 2025-06-04 NOTE — PHYSICAL THERAPY INITIAL EVALUATION ADULT - PERTINENT HX OF CURRENT PROBLEM, REHAB EVAL
Pt is 74M admitted 6/2/25 PMHx CAD s/p ZHOU to Lcx (last in 12/2024), HTN, DM2, presents with fever, cough, weakness since last Friday. No recent travel or sick contact. Cough is nonproductive and associated with burning sensation in chest with exertion.       CT CHEST: Left lower lobe lung consolidation compatible with a pneumonia. Trace left pleural effusion.No acute intra-abdominal or pelvic findings.Left adrenal myelolipoma left adrenal nodule with peripheral calcification measuring up to 2 cm, unchanged, presumably an adenoma. Right adrenal nodule measuring up to 1 cm. Nonemergent MRI of the abdomen with adrenal protocol may be considered for better characterization.Moderate to large stool burden in the colon.

## 2025-06-04 NOTE — PHYSICAL THERAPY INITIAL EVALUATION ADULT - BALANCE DISTURBANCE, IDENTIFIED IMPAIRMENT CONTRIBUTE, REHAB EVAL
Minocycline Counseling: Patient advised regarding possible photosensitivity and discoloration of the teeth, skin, lips, tongue and gums.  Patient instructed to avoid sunlight, if possible.  When exposed to sunlight, patients should wear protective clothing, sunglasses, and sunscreen.  The patient was instructed to call the office immediately if the following severe adverse effects occur:  hearing changes, easy bruising/bleeding, severe headache, or vision changes.  The patient verbalized understanding of the proper use and possible adverse effects of minocycline.  All of the patient's questions and concerns were addressed. decreased strength

## 2025-06-04 NOTE — PROGRESS NOTE ADULT - SUBJECTIVE AND OBJECTIVE BOX
PROGRESS NOTE:     Patient is a 74y old  Male who presents with a chief complaint of fever, cough (03 Jun 2025 12:08)      SUBJECTIVE / OVERNIGHT EVENTS:    OVERNIGHT: No acute overnight events.      Patient was examined at bedside and feels well this morning. Denies fever, chills, chest pain, SOB, nausea, vomiting. ROS otherwise negative and pt is amenable to current treatment plan.      REVIEW OF SYSTEMS:    CONSTITUTIONAL:  No weakness, fevers, or chills  EYES/ENT:  No visual changes, vertigo, or throat pain   NECK:  No pain or stiffness  RESPIRATORY:  No SOB, cough, wheezing, or hemoptysis  CARDIOVASCULAR:  No chest pain or palpitations  GASTROINTESTINAL:  No abdominal pain, nausea, vomiting, or hematemesis; no diarrhea, constipation, melena, or hematochezia  GENITOURINARY:  No dysuria, polyuria, or hematuria  NEUROLOGICAL:  No numbness or weakness  SKIN:  No itching or rashes      MEDICATIONS  (STANDING):  albuterol    0.083% 2.5 milliGRAM(s) Nebulizer every 8 hours  aspirin enteric coated 81 milliGRAM(s) Oral daily  atorvastatin 20 milliGRAM(s) Oral at bedtime  azithromycin  IVPB 500 milliGRAM(s) IV Intermittent every 24 hours  benzonatate 100 milliGRAM(s) Oral every 8 hours  cefTRIAXone   IVPB 1000 milliGRAM(s) IV Intermittent every 24 hours  clopidogrel Tablet 75 milliGRAM(s) Oral daily  dextrose 5%. 1000 milliLiter(s) (100 mL/Hr) IV Continuous <Continuous>  dextrose 5%. 1000 milliLiter(s) (50 mL/Hr) IV Continuous <Continuous>  dextrose 50% Injectable 25 Gram(s) IV Push once  dextrose 50% Injectable 12.5 Gram(s) IV Push once  dextrose 50% Injectable 25 Gram(s) IV Push once  ezetimibe 10 milliGRAM(s) Oral daily  glucagon  Injectable 1 milliGRAM(s) IntraMuscular once  guaiFENesin  milliGRAM(s) Oral every 12 hours  insulin lispro (ADMELOG) corrective regimen sliding scale   SubCutaneous three times a day before meals  insulin lispro (ADMELOG) corrective regimen sliding scale   SubCutaneous at bedtime  lactated ringers. 1000 milliLiter(s) (75 mL/Hr) IV Continuous <Continuous>  polyethylene glycol 3350 17 Gram(s) Oral daily  senna 2 Tablet(s) Oral at bedtime  tamsulosin 0.4 milliGRAM(s) Oral at bedtime    MEDICATIONS  (PRN):  acetaminophen     Tablet .. 650 milliGRAM(s) Oral every 6 hours PRN Temp greater or equal to 38C (100.4F), Mild Pain (1 - 3)  dextrose Oral Gel 15 Gram(s) Oral once PRN Blood Glucose LESS THAN 70 milliGRAM(s)/deciliter      CAPILLARY BLOOD GLUCOSE      POCT Blood Glucose.: 257 mg/dL (03 Jun 2025 22:04)  POCT Blood Glucose.: 206 mg/dL (03 Jun 2025 17:13)  POCT Blood Glucose.: 106 mg/dL (03 Jun 2025 13:18)  POCT Blood Glucose.: 129 mg/dL (03 Jun 2025 08:55)    I&O's Summary    03 Jun 2025 07:01  -  04 Jun 2025 07:00  --------------------------------------------------------  IN: 0 mL / OUT: 1150 mL / NET: -1150 mL        PHYSICAL EXAM:  Vital Signs Last 24 Hrs  T(C): 37.7 (04 Jun 2025 05:00), Max: 38.5 (03 Jun 2025 17:13)  T(F): 99.8 (04 Jun 2025 05:00), Max: 101.3 (03 Jun 2025 17:13)  HR: 120 (04 Jun 2025 05:00) (107 - 120)  BP: 133/78 (04 Jun 2025 05:00) (107/64 - 160/86)  BP(mean): --  RR: 18 (04 Jun 2025 05:00) (18 - 18)  SpO2: 95% (04 Jun 2025 05:00) (95% - 98%)    Parameters below as of 04 Jun 2025 05:00  Patient On (Oxygen Delivery Method): room air        CONSTITUTIONAL: NAD; well-developed  HEENT: PERRL, clear conjunctiva  RESPIRATORY: Normal respiratory effort; lungs are clear to auscultation bilaterally; No crackles/rhonchi/wheezing  CARDIOVASCULAR: Regular rate and rhythm, normal S1 and S2, no murmur/rub/gallop; No lower extremity edema; Peripheral pulses are 2+ bilaterally  ABDOMEN: Nontender to palpation, normoactive bowel sounds, no rebound/guarding; No hepatosplenomegaly  MUSCULOSKELETAL: No clubbing or cyanosis of digits; no joint swelling or tenderness to palpation  EXTREMITY: Lower extremities non-tender to palpation; non-erythematous B/L  NEURO: A&Ox3; no focal deficits   PSYCH: Normal mood; affect appropriate    LABS:                        9.3    7.60  )-----------( 379      ( 04 Jun 2025 06:37 )             30.7     06-04    132[L]  |  99  |  22  ----------------------------<  169[H]  4.1   |  18[L]  |  1.62[H]    Ca    8.6      04 Jun 2025 06:37  Phos  2.8     06-04  Mg     2.1     06-04    TPro  6.1  /  Alb  2.9[L]  /  TBili  0.2  /  DBili  x   /  AST  91[H]  /  ALT  60[H]  /  AlkPhos  79  06-04          Urinalysis Basic - ( 04 Jun 2025 06:37 )    Color: x / Appearance: x / SG: x / pH: x  Gluc: 169 mg/dL / Ketone: x  / Bili: x / Urobili: x   Blood: x / Protein: x / Nitrite: x   Leuk Esterase: x / RBC: x / WBC x   Sq Epi: x / Non Sq Epi: x / Bacteria: x        Urinalysis with Rflx Culture (collected 01 Jun 2025 23:54)    Culture - Blood (collected 01 Jun 2025 23:10)  Source: Blood Blood-Peripheral  Preliminary Report (04 Jun 2025 02:02):    No growth at 48 Hours    Culture - Blood (collected 01 Jun 2025 23:00)  Source: Blood Blood-Peripheral  Preliminary Report (04 Jun 2025 02:02):    No growth at 48 Hours        RADIOLOGY & ADDITIONAL TESTS:    N/A PROGRESS NOTE:     Patient is a 74y old  Male who presents with a chief complaint of fever, cough (03 Jun 2025 12:08)      SUBJECTIVE / OVERNIGHT EVENTS:    OVERNIGHT: No acute overnight events.    Patient was examined at bedside and feels well this morning. Does complain of dizziness when getting up from bed.  Denies fever, chills, chest pain, SOB, nausea, vomiting. ROS otherwise negative and pt is amenable to current treatment plan.      REVIEW OF SYSTEMS:    CONSTITUTIONAL:  No weakness, fevers, or chills  EYES/ENT:  No visual changes, vertigo, or throat pain   NECK:  No pain or stiffness  RESPIRATORY:  No SOB, cough, wheezing, or hemoptysis  CARDIOVASCULAR:  No chest pain or palpitations  GASTROINTESTINAL:  No abdominal pain, nausea, vomiting, or hematemesis; no diarrhea, constipation, melena, or hematochezia  GENITOURINARY:  No dysuria, polyuria, or hematuria  NEUROLOGICAL:  No numbness or weakness  SKIN:  No itching or rashes      MEDICATIONS  (STANDING):  albuterol    0.083% 2.5 milliGRAM(s) Nebulizer every 8 hours  aspirin enteric coated 81 milliGRAM(s) Oral daily  atorvastatin 20 milliGRAM(s) Oral at bedtime  azithromycin  IVPB 500 milliGRAM(s) IV Intermittent every 24 hours  benzonatate 100 milliGRAM(s) Oral every 8 hours  cefTRIAXone   IVPB 1000 milliGRAM(s) IV Intermittent every 24 hours  clopidogrel Tablet 75 milliGRAM(s) Oral daily  dextrose 5%. 1000 milliLiter(s) (100 mL/Hr) IV Continuous <Continuous>  dextrose 5%. 1000 milliLiter(s) (50 mL/Hr) IV Continuous <Continuous>  dextrose 50% Injectable 25 Gram(s) IV Push once  dextrose 50% Injectable 12.5 Gram(s) IV Push once  dextrose 50% Injectable 25 Gram(s) IV Push once  ezetimibe 10 milliGRAM(s) Oral daily  glucagon  Injectable 1 milliGRAM(s) IntraMuscular once  guaiFENesin  milliGRAM(s) Oral every 12 hours  insulin lispro (ADMELOG) corrective regimen sliding scale   SubCutaneous three times a day before meals  insulin lispro (ADMELOG) corrective regimen sliding scale   SubCutaneous at bedtime  lactated ringers. 1000 milliLiter(s) (75 mL/Hr) IV Continuous <Continuous>  polyethylene glycol 3350 17 Gram(s) Oral daily  senna 2 Tablet(s) Oral at bedtime  tamsulosin 0.4 milliGRAM(s) Oral at bedtime    MEDICATIONS  (PRN):  acetaminophen     Tablet .. 650 milliGRAM(s) Oral every 6 hours PRN Temp greater or equal to 38C (100.4F), Mild Pain (1 - 3)  dextrose Oral Gel 15 Gram(s) Oral once PRN Blood Glucose LESS THAN 70 milliGRAM(s)/deciliter      CAPILLARY BLOOD GLUCOSE      POCT Blood Glucose.: 257 mg/dL (03 Jun 2025 22:04)  POCT Blood Glucose.: 206 mg/dL (03 Jun 2025 17:13)  POCT Blood Glucose.: 106 mg/dL (03 Jun 2025 13:18)  POCT Blood Glucose.: 129 mg/dL (03 Jun 2025 08:55)    I&O's Summary    03 Jun 2025 07:01  -  04 Jun 2025 07:00  --------------------------------------------------------  IN: 0 mL / OUT: 1150 mL / NET: -1150 mL        PHYSICAL EXAM:  Vital Signs Last 24 Hrs  T(C): 37.7 (04 Jun 2025 05:00), Max: 38.5 (03 Jun 2025 17:13)  T(F): 99.8 (04 Jun 2025 05:00), Max: 101.3 (03 Jun 2025 17:13)  HR: 120 (04 Jun 2025 05:00) (107 - 120)  BP: 133/78 (04 Jun 2025 05:00) (107/64 - 160/86)  BP(mean): --  RR: 18 (04 Jun 2025 05:00) (18 - 18)  SpO2: 95% (04 Jun 2025 05:00) (95% - 98%)    Parameters below as of 04 Jun 2025 05:00  Patient On (Oxygen Delivery Method): room air        CONSTITUTIONAL: NAD; well-developed  HEENT: PERRL, clear conjunctiva  RESPIRATORY: Normal respiratory effort; lungs are clear to auscultation bilaterally; No crackles/rhonchi/wheezing  CARDIOVASCULAR: Regular rate and rhythm, normal S1 and S2, no murmur/rub/gallop; No lower extremity edema; Peripheral pulses are 2+ bilaterally  ABDOMEN: Nontender to palpation, normoactive bowel sounds, no rebound/guarding; No hepatosplenomegaly  MUSCULOSKELETAL: No clubbing or cyanosis of digits; no joint swelling or tenderness to palpation  EXTREMITY: Lower extremities non-tender to palpation; non-erythematous B/L  NEURO: A&Ox3; no focal deficits   PSYCH: Normal mood; affect appropriate    LABS:                        9.3    7.60  )-----------( 379      ( 04 Jun 2025 06:37 )             30.7     06-04    132[L]  |  99  |  22  ----------------------------<  169[H]  4.1   |  18[L]  |  1.62[H]    Ca    8.6      04 Jun 2025 06:37  Phos  2.8     06-04  Mg     2.1     06-04    TPro  6.1  /  Alb  2.9[L]  /  TBili  0.2  /  DBili  x   /  AST  91[H]  /  ALT  60[H]  /  AlkPhos  79  06-04          Urinalysis Basic - ( 04 Jun 2025 06:37 )    Color: x / Appearance: x / SG: x / pH: x  Gluc: 169 mg/dL / Ketone: x  / Bili: x / Urobili: x   Blood: x / Protein: x / Nitrite: x   Leuk Esterase: x / RBC: x / WBC x   Sq Epi: x / Non Sq Epi: x / Bacteria: x        Urinalysis with Rflx Culture (collected 01 Jun 2025 23:54)    Culture - Blood (collected 01 Jun 2025 23:10)  Source: Blood Blood-Peripheral  Preliminary Report (04 Jun 2025 02:02):    No growth at 48 Hours    Culture - Blood (collected 01 Jun 2025 23:00)  Source: Blood Blood-Peripheral  Preliminary Report (04 Jun 2025 02:02):    No growth at 48 Hours        RADIOLOGY & ADDITIONAL TESTS:    N/A

## 2025-06-04 NOTE — DIETITIAN INITIAL EVALUATION ADULT - PERTINENT MEDS FT
MEDICATIONS  (STANDING):  aspirin enteric coated 81 milliGRAM(s) Oral daily  atorvastatin 20 milliGRAM(s) Oral at bedtime  azithromycin  IVPB 500 milliGRAM(s) IV Intermittent every 24 hours  benzonatate 100 milliGRAM(s) Oral every 8 hours  cefTRIAXone   IVPB 1000 milliGRAM(s) IV Intermittent every 24 hours  clopidogrel Tablet 75 milliGRAM(s) Oral daily  dextrose 5%. 1000 milliLiter(s) (100 mL/Hr) IV Continuous <Continuous>  dextrose 5%. 1000 milliLiter(s) (50 mL/Hr) IV Continuous <Continuous>  dextrose 50% Injectable 25 Gram(s) IV Push once  dextrose 50% Injectable 12.5 Gram(s) IV Push once  dextrose 50% Injectable 25 Gram(s) IV Push once  ezetimibe 10 milliGRAM(s) Oral daily  glucagon  Injectable 1 milliGRAM(s) IntraMuscular once  guaiFENesin  milliGRAM(s) Oral every 12 hours  insulin lispro (ADMELOG) corrective regimen sliding scale   SubCutaneous three times a day before meals  insulin lispro (ADMELOG) corrective regimen sliding scale   SubCutaneous at bedtime  lactated ringers. 1000 milliLiter(s) (75 mL/Hr) IV Continuous <Continuous>  polyethylene glycol 3350 17 Gram(s) Oral daily  senna 2 Tablet(s) Oral at bedtime  tamsulosin 0.4 milliGRAM(s) Oral at bedtime    MEDICATIONS  (PRN):  acetaminophen     Tablet .. 650 milliGRAM(s) Oral every 6 hours PRN Temp greater or equal to 38C (100.4F), Mild Pain (1 - 3)  albuterol/ipratropium for Nebulization. 3 milliLiter(s) Nebulizer once PRN Shortness of Breath and/or Wheezing  dextrose Oral Gel 15 Gram(s) Oral once PRN Blood Glucose LESS THAN 70 milliGRAM(s)/deciliter

## 2025-06-04 NOTE — PROGRESS NOTE ADULT - PROBLEM SELECTOR PLAN 6
SCD for VTE prophylaxis  miralax for bowel regimen- last BM reportedly 2 days ago  OOB as tolerates incidental finding of bilateral adrenal nodule on CT- patient informed of this and advised to follow up outpatient with PMD

## 2025-06-05 ENCOUNTER — TRANSCRIPTION ENCOUNTER (OUTPATIENT)
Age: 75
End: 2025-06-05

## 2025-06-05 VITALS
TEMPERATURE: 99 F | SYSTOLIC BLOOD PRESSURE: 116 MMHG | RESPIRATION RATE: 17 BRPM | DIASTOLIC BLOOD PRESSURE: 63 MMHG | HEART RATE: 103 BPM | OXYGEN SATURATION: 96 %

## 2025-06-05 LAB
ALBUMIN SERPL ELPH-MCNC: 2.9 G/DL — LOW (ref 3.3–5)
ALP SERPL-CCNC: 89 U/L — SIGNIFICANT CHANGE UP (ref 40–120)
ALT FLD-CCNC: 85 U/L — HIGH (ref 10–45)
ANION GAP SERPL CALC-SCNC: 13 MMOL/L — SIGNIFICANT CHANGE UP (ref 5–17)
AST SERPL-CCNC: 113 U/L — HIGH (ref 10–40)
BASOPHILS # BLD AUTO: 0.02 K/UL — SIGNIFICANT CHANGE UP (ref 0–0.2)
BASOPHILS NFR BLD AUTO: 0.3 % — SIGNIFICANT CHANGE UP (ref 0–2)
BILIRUB SERPL-MCNC: 0.2 MG/DL — SIGNIFICANT CHANGE UP (ref 0.2–1.2)
BUN SERPL-MCNC: 24 MG/DL — HIGH (ref 7–23)
CALCIUM SERPL-MCNC: 8.8 MG/DL — SIGNIFICANT CHANGE UP (ref 8.4–10.5)
CHLORIDE SERPL-SCNC: 102 MMOL/L — SIGNIFICANT CHANGE UP (ref 96–108)
CO2 SERPL-SCNC: 19 MMOL/L — LOW (ref 22–31)
CREAT SERPL-MCNC: 1.47 MG/DL — HIGH (ref 0.5–1.3)
EGFR: 49 ML/MIN/1.73M2 — LOW
EGFR: 49 ML/MIN/1.73M2 — LOW
EOSINOPHIL # BLD AUTO: 0.2 K/UL — SIGNIFICANT CHANGE UP (ref 0–0.5)
EOSINOPHIL NFR BLD AUTO: 3.4 % — SIGNIFICANT CHANGE UP (ref 0–6)
GLUCOSE BLDC GLUCOMTR-MCNC: 141 MG/DL — HIGH (ref 70–99)
GLUCOSE SERPL-MCNC: 179 MG/DL — HIGH (ref 70–99)
HCT VFR BLD CALC: 30 % — LOW (ref 39–50)
HGB BLD-MCNC: 9.1 G/DL — LOW (ref 13–17)
IMM GRANULOCYTES NFR BLD AUTO: 1.2 % — HIGH (ref 0–0.9)
LYMPHOCYTES # BLD AUTO: 1.16 K/UL — SIGNIFICANT CHANGE UP (ref 1–3.3)
LYMPHOCYTES # BLD AUTO: 19.5 % — SIGNIFICANT CHANGE UP (ref 13–44)
MAGNESIUM SERPL-MCNC: 2.1 MG/DL — SIGNIFICANT CHANGE UP (ref 1.6–2.6)
MCHC RBC-ENTMCNC: 22.9 PG — LOW (ref 27–34)
MCHC RBC-ENTMCNC: 30.3 G/DL — LOW (ref 32–36)
MCV RBC AUTO: 75.4 FL — LOW (ref 80–100)
MONOCYTES # BLD AUTO: 0.98 K/UL — HIGH (ref 0–0.9)
MONOCYTES NFR BLD AUTO: 16.5 % — HIGH (ref 2–14)
NEUTROPHILS # BLD AUTO: 3.51 K/UL — SIGNIFICANT CHANGE UP (ref 1.8–7.4)
NEUTROPHILS NFR BLD AUTO: 59.1 % — SIGNIFICANT CHANGE UP (ref 43–77)
NRBC BLD AUTO-RTO: 0 /100 WBCS — SIGNIFICANT CHANGE UP (ref 0–0)
PHOSPHATE SERPL-MCNC: 3.4 MG/DL — SIGNIFICANT CHANGE UP (ref 2.5–4.5)
PLATELET # BLD AUTO: 388 K/UL — SIGNIFICANT CHANGE UP (ref 150–400)
POTASSIUM SERPL-MCNC: 4.3 MMOL/L — SIGNIFICANT CHANGE UP (ref 3.5–5.3)
POTASSIUM SERPL-SCNC: 4.3 MMOL/L — SIGNIFICANT CHANGE UP (ref 3.5–5.3)
PROT SERPL-MCNC: 5.8 G/DL — LOW (ref 6–8.3)
RBC # BLD: 3.98 M/UL — LOW (ref 4.2–5.8)
RBC # FLD: 15.1 % — HIGH (ref 10.3–14.5)
SODIUM SERPL-SCNC: 134 MMOL/L — LOW (ref 135–145)
WBC # BLD: 5.94 K/UL — SIGNIFICANT CHANGE UP (ref 3.8–10.5)
WBC # FLD AUTO: 5.94 K/UL — SIGNIFICANT CHANGE UP (ref 3.8–10.5)

## 2025-06-05 PROCEDURE — 84145 PROCALCITONIN (PCT): CPT

## 2025-06-05 PROCEDURE — 82550 ASSAY OF CK (CPK): CPT

## 2025-06-05 PROCEDURE — 99239 HOSP IP/OBS DSCHRG MGMT >30: CPT | Mod: GC

## 2025-06-05 PROCEDURE — 87040 BLOOD CULTURE FOR BACTERIA: CPT

## 2025-06-05 PROCEDURE — 81001 URINALYSIS AUTO W/SCOPE: CPT

## 2025-06-05 PROCEDURE — 83605 ASSAY OF LACTIC ACID: CPT

## 2025-06-05 PROCEDURE — 82330 ASSAY OF CALCIUM: CPT

## 2025-06-05 PROCEDURE — 99285 EMERGENCY DEPT VISIT HI MDM: CPT | Mod: 25

## 2025-06-05 PROCEDURE — 36415 COLL VENOUS BLD VENIPUNCTURE: CPT

## 2025-06-05 PROCEDURE — 87637 SARSCOV2&INF A&B&RSV AMP PRB: CPT

## 2025-06-05 PROCEDURE — C8929: CPT

## 2025-06-05 PROCEDURE — 84132 ASSAY OF SERUM POTASSIUM: CPT

## 2025-06-05 PROCEDURE — 96375 TX/PRO/DX INJ NEW DRUG ADDON: CPT

## 2025-06-05 PROCEDURE — 84484 ASSAY OF TROPONIN QUANT: CPT

## 2025-06-05 PROCEDURE — 82962 GLUCOSE BLOOD TEST: CPT

## 2025-06-05 PROCEDURE — 85018 HEMOGLOBIN: CPT

## 2025-06-05 PROCEDURE — 96374 THER/PROPH/DIAG INJ IV PUSH: CPT

## 2025-06-05 PROCEDURE — 85730 THROMBOPLASTIN TIME PARTIAL: CPT

## 2025-06-05 PROCEDURE — 80048 BASIC METABOLIC PNL TOTAL CA: CPT

## 2025-06-05 PROCEDURE — 83880 ASSAY OF NATRIURETIC PEPTIDE: CPT

## 2025-06-05 PROCEDURE — 71250 CT THORAX DX C-: CPT

## 2025-06-05 PROCEDURE — 93005 ELECTROCARDIOGRAM TRACING: CPT

## 2025-06-05 PROCEDURE — 74176 CT ABD & PELVIS W/O CONTRAST: CPT

## 2025-06-05 PROCEDURE — 85027 COMPLETE CBC AUTOMATED: CPT

## 2025-06-05 PROCEDURE — 82435 ASSAY OF BLOOD CHLORIDE: CPT

## 2025-06-05 PROCEDURE — 82803 BLOOD GASES ANY COMBINATION: CPT

## 2025-06-05 PROCEDURE — 71045 X-RAY EXAM CHEST 1 VIEW: CPT

## 2025-06-05 PROCEDURE — 83036 HEMOGLOBIN GLYCOSYLATED A1C: CPT

## 2025-06-05 PROCEDURE — 85610 PROTHROMBIN TIME: CPT

## 2025-06-05 PROCEDURE — 97162 PT EVAL MOD COMPLEX 30 MIN: CPT

## 2025-06-05 PROCEDURE — 93308 TTE F-UP OR LMTD: CPT

## 2025-06-05 PROCEDURE — 82553 CREATINE MB FRACTION: CPT

## 2025-06-05 PROCEDURE — 82947 ASSAY GLUCOSE BLOOD QUANT: CPT

## 2025-06-05 PROCEDURE — 87899 AGENT NOS ASSAY W/OPTIC: CPT

## 2025-06-05 PROCEDURE — 84295 ASSAY OF SERUM SODIUM: CPT

## 2025-06-05 PROCEDURE — 94640 AIRWAY INHALATION TREATMENT: CPT

## 2025-06-05 PROCEDURE — 83735 ASSAY OF MAGNESIUM: CPT

## 2025-06-05 PROCEDURE — 85014 HEMATOCRIT: CPT

## 2025-06-05 PROCEDURE — 80053 COMPREHEN METABOLIC PANEL: CPT

## 2025-06-05 PROCEDURE — 84100 ASSAY OF PHOSPHORUS: CPT

## 2025-06-05 PROCEDURE — 85025 COMPLETE CBC W/AUTO DIFF WBC: CPT

## 2025-06-05 RX ORDER — AZITHROMYCIN 250 MG
1 CAPSULE ORAL
Qty: 3 | Refills: 0
Start: 2025-06-05 | End: 2025-06-07

## 2025-06-05 RX ORDER — LOSARTAN POTASSIUM 100 MG/1
100 TABLET, FILM COATED ORAL DAILY
Refills: 0 | Status: DISCONTINUED | OUTPATIENT
Start: 2025-06-05 | End: 2025-06-05

## 2025-06-05 RX ADMIN — CLOPIDOGREL BISULFATE 75 MILLIGRAM(S): 75 TABLET, FILM COATED ORAL at 11:35

## 2025-06-05 RX ADMIN — EZETIMIBE 10 MILLIGRAM(S): 10 TABLET ORAL at 11:35

## 2025-06-05 RX ADMIN — Medication 100 MILLIGRAM(S): at 05:24

## 2025-06-05 RX ADMIN — Medication 250 MILLIGRAM(S): at 03:21

## 2025-06-05 RX ADMIN — Medication 81 MILLIGRAM(S): at 11:35

## 2025-06-05 RX ADMIN — DEXTROMETHORPHAN HBR, GUAIFENESIN 600 MILLIGRAM(S): 200 LIQUID ORAL at 05:24

## 2025-06-05 NOTE — DISCHARGE NOTE PROVIDER - NSDCCPCAREPLAN_GEN_ALL_CORE_FT
PRINCIPAL DISCHARGE DIAGNOSIS  Diagnosis: Pneumonia  Assessment and Plan of Treatment: You were admitted to the hospital for a severe lung infection called Legionella pneumonia. This infection caused a serious reaction throughout your body called sepsis, and also affected your kidneys. You were treated with antibiotics and fluids, and your condition improved. We also temporarily stopped your blood pressure medication, losartan, to help your kidneys recover. You'll continue on azithromycin for a few more days. You should continue taking your other heart medications as prescribed. If you experience any worsening cough, shortness of breath, chest pain, renewed fever, dizziness, or any other concerning symptoms, you should return to the hospital immediately. Please take all medications exactly as prescribed. Please follow up with your PCP.   Medication Changes:  START Azithromycin 500mg once per day for 3 days (6/6-6/8)  Follow Up:  1. PCP for general health maintenance      SECONDARY DISCHARGE DIAGNOSES  Diagnosis: Adrenal nodule  Assessment and Plan of Treatment: A scan showed some nodules on your adrenal glands, which you should discuss with your primary care doctor.

## 2025-06-05 NOTE — DISCHARGE NOTE NURSING/CASE MANAGEMENT/SOCIAL WORK - NSDCPEFALRISK_GEN_ALL_CORE
For information on Fall & Injury Prevention, visit: [email or web address] OR  [email or web address] OR  https://www.cdc.gov/steadi/patient.html

## 2025-06-05 NOTE — PROGRESS NOTE ADULT - PROBLEM SELECTOR PLAN 1
met severe sepsis criteria (fever, tachycardia with lactate of 2.1) due to PNA (LLL consolidation concerning for PNA on CT chest)  - elevated procalcitonin of 2.  - BCx neg at 48 hrs  - Ur legionella +   PLAN:  - ID c/s recommend c/w azithromax (7 day course), d/c ceftriaxone  - mucinex, albuterol neb  - if persistent wheezing, consider trial of steroid  - IVF with LR at 75cc/hr

## 2025-06-05 NOTE — DISCHARGE NOTE PROVIDER - NSDCMRMEDTOKEN_GEN_ALL_CORE_FT
Aspirin Enteric Coated 81 mg oral delayed release tablet: 1 tab(s) orally once a day  Azithromycin 3 Day Dose Pack 500 mg oral tablet: 1 tab(s) orally once a day  clopidogrel 75 mg oral tablet: 1 tab(s) orally once a day MDD: 1  ezetimibe 10 mg oral tablet: 1 tab(s) orally once a day  losartan 100 mg oral tablet: 1 tab(s) orally once a day  metFORMIN 500 mg oral tablet: 2 tab(s) orally 2 times a day  mometasone 0.1% topical cream: Apply topically to affected area once a day  pravastatin 80 mg oral tablet: 1 tab(s) orally once a day (at bedtime)  tamsulosin 0.4 mg oral capsule: 1 cap(s) orally once a day (at bedtime)  Vitamins &amp; Supplements: B12, D3: 1 tablet orally once a day   Aspirin Enteric Coated 81 mg oral delayed release tablet: 1 tab(s) orally once a day  Azithromycin 3 Day Dose Pack 500 mg oral tablet: 1 tab(s) orally once a day  clopidogrel 75 mg oral tablet: 1 tab(s) orally once a day MDD: 1  ezetimibe 10 mg oral tablet: 1 tab(s) orally once a day  losartan 100 mg oral tablet: 1 tab(s) orally once a day  metFORMIN 500 mg oral tablet: 2 tab(s) orally 2 times a day  mometasone 0.1% topical cream: Apply topically to affected area once a day  pravastatin 80 mg oral tablet: 1 tab(s) orally once a day (at bedtime)  Rolling Walker: Use as needed to assist with ambulation  tamsulosin 0.4 mg oral capsule: 1 cap(s) orally once a day (at bedtime)  Vitamins &amp; Supplements: B12, D3: 1 tablet orally once a day

## 2025-06-05 NOTE — DISCHARGE NOTE PROVIDER - CARE PROVIDER_API CALL
Jose Gibbons  Cardiology  [address] [facility] [address], [address]  [place] [address]  Phone: [phone]  Fax: [phone]  Established Patient  Follow Up Time: 1 week

## 2025-06-05 NOTE — PROGRESS NOTE ADULT - PROBLEM SELECTOR PLAN 7
SCD for VTE prophylaxis  miralax for bowel regimen- last BM reportedly 2 days ago  OOB as tolerates  Dispo: Home PT
SCD for VTE prophylaxis  miralax for bowel regimen- last BM reportedly 2 days ago  OOB as tolerates  Dispo: Home PT

## 2025-06-05 NOTE — PROGRESS NOTE ADULT - PROBLEM SELECTOR PLAN 4
recent ZHOU to LCX in 12/2024  - continue home meds (ASA 81, plavix)  - Dr. Portillo from cardiology made aware

## 2025-06-05 NOTE — DISCHARGE NOTE NURSING/CASE MANAGEMENT/SOCIAL WORK - FINANCIAL ASSISTANCE
[facility] provides services at a reduced cost to those who are determined to be eligible through [facility]’s financial assistance program. Information regarding [facility]’s financial assistance program can be found by going to [email or web address] or by calling [phone].

## 2025-06-05 NOTE — DISCHARGE NOTE NURSING/CASE MANAGEMENT/SOCIAL WORK - NSTRANSFERDENTURES_GEN_A_NUR
[FreeTextEntry4] : left breast IDC, grade 2, ER+/VT+, HER2- [TTNM] : 1c [NTNM] : 0 [MTNM] : 0 full/upper

## 2025-06-05 NOTE — PROGRESS NOTE ADULT - ASSESSMENT
74M hx CAD s/p ZHOU to Lcx (last in 12/2024), HTN, DM2, presents with fever, cough, weakness found to have severe sepsis due to LLL PNA 2/2 legionella

## 2025-06-05 NOTE — DISCHARGE NOTE PROVIDER - NSDCFUSCHEDAPPT_GEN_ALL_CORE_FT
Silver Davison  [facility]  ENDOCRIN [address]  Scheduled Appointment: 08/07/2025    [facility]  CARDIOLOGY [address]   Scheduled Appointment: 08/07/2025     Naresh Wallace  [facility] Physician [facility]  INTMED [address]  Scheduled Appointment: 06/09/2025    Silver Davison  [facility] Physician [facility]  ENDOCRIN [address]  Scheduled Appointment: 08/07/2025    [facility]  CARDIOLOGY [address]   Scheduled Appointment: 08/07/2025

## 2025-06-05 NOTE — PROGRESS NOTE ADULT - ATTENDING COMMENTS
#sepsis, pneumonia  #LEIA on CKD  #CAD, angina    - legionella ag +, febrile again  - continue abx azithro, ceft. ID consult  - echo unremarkable. appreciate cards recs  - sinus tachy in the setting of sepsis, deconditioning. orthostats neg. fall precautions.   - on dapt, statin  - losartan with hold parameters  - monitor HH, sCr downtrending, Na improving  - ISS, goal fs 100-180  - OOBTC. PT eval
#sepsis, pneumonia  #LEIA on CKD  #CAD, angina    - legionella ag +, febrile again  - appreciate ID recs - finish course with azithromycin  - echo unremarkable. appreciate cards recs  - sinus tachy in the setting of sepsis, deconditioning. orthostats neg. fall precautions.   - on dapt, statin  - losartan with hold parameters  - monitor HH, sCr downtrending, Na improving  - ISS, goal fs 100-180  - OOBTC. PT eval.   - discharge planning, close outpt pcp f/u
#sepsis, pneumonia  #LEIA on CKD  #CAD, angina    - continue abx ceft, azithro  - f/u echo  - on dapt, statin  - losartan with hold parameters  - f/u cardiology for further recs  - monitor HH, sCr downtrending, Na improving  - ISS, goal fs 100-180  - OOBTC

## 2025-06-05 NOTE — PROGRESS NOTE ADULT - SUBJECTIVE AND OBJECTIVE BOX
PROGRESS NOTE:     Patient is a 74y old  Male who presents with a chief complaint of fever, cough (04 Jun 2025 14:49)      SUBJECTIVE / OVERNIGHT EVENTS:    OVERNIGHT: No acute overnight events.      Patient was examined at bedside and feels well this morning. Denies fever, chills, chest pain, SOB, nausea, vomiting. ROS otherwise negative and pt is amenable to current treatment plan.      REVIEW OF SYSTEMS:    CONSTITUTIONAL:  No weakness, fevers, or chills  EYES/ENT:  No visual changes, vertigo, or throat pain   NECK:  No pain or stiffness  RESPIRATORY:  No SOB, cough, wheezing, or hemoptysis  CARDIOVASCULAR:  No chest pain or palpitations  GASTROINTESTINAL:  No abdominal pain, nausea, vomiting, or hematemesis; no diarrhea, constipation, melena, or hematochezia  GENITOURINARY:  No dysuria, polyuria, or hematuria  NEUROLOGICAL:  No numbness or weakness  SKIN:  No itching or rashes      MEDICATIONS  (STANDING):  aspirin enteric coated 81 milliGRAM(s) Oral daily  atorvastatin 20 milliGRAM(s) Oral at bedtime  azithromycin  IVPB 500 milliGRAM(s) IV Intermittent every 24 hours  benzonatate 100 milliGRAM(s) Oral every 8 hours  clopidogrel Tablet 75 milliGRAM(s) Oral daily  dextrose 5%. 1000 milliLiter(s) (100 mL/Hr) IV Continuous <Continuous>  dextrose 5%. 1000 milliLiter(s) (50 mL/Hr) IV Continuous <Continuous>  dextrose 50% Injectable 25 Gram(s) IV Push once  dextrose 50% Injectable 12.5 Gram(s) IV Push once  dextrose 50% Injectable 25 Gram(s) IV Push once  ezetimibe 10 milliGRAM(s) Oral daily  glucagon  Injectable 1 milliGRAM(s) IntraMuscular once  guaiFENesin  milliGRAM(s) Oral every 12 hours  insulin lispro (ADMELOG) corrective regimen sliding scale   SubCutaneous three times a day before meals  insulin lispro (ADMELOG) corrective regimen sliding scale   SubCutaneous at bedtime  lactated ringers. 1000 milliLiter(s) (75 mL/Hr) IV Continuous <Continuous>  polyethylene glycol 3350 17 Gram(s) Oral daily  senna 2 Tablet(s) Oral at bedtime  tamsulosin 0.4 milliGRAM(s) Oral at bedtime    MEDICATIONS  (PRN):  acetaminophen     Tablet .. 650 milliGRAM(s) Oral every 6 hours PRN Temp greater or equal to 38C (100.4F), Mild Pain (1 - 3)  albuterol/ipratropium for Nebulization. 3 milliLiter(s) Nebulizer once PRN Shortness of Breath and/or Wheezing  dextrose Oral Gel 15 Gram(s) Oral once PRN Blood Glucose LESS THAN 70 milliGRAM(s)/deciliter      CAPILLARY BLOOD GLUCOSE      POCT Blood Glucose.: 234 mg/dL (04 Jun 2025 21:49)  POCT Blood Glucose.: 218 mg/dL (04 Jun 2025 17:03)  POCT Blood Glucose.: 158 mg/dL (04 Jun 2025 12:55)  POCT Blood Glucose.: 142 mg/dL (04 Jun 2025 08:38)    I&O's Summary    04 Jun 2025 07:01  -  05 Jun 2025 07:00  --------------------------------------------------------  IN: 250 mL / OUT: 1050 mL / NET: -800 mL        PHYSICAL EXAM:  Vital Signs Last 24 Hrs  T(C): 36.8 (05 Jun 2025 05:22), Max: 37.1 (04 Jun 2025 13:41)  T(F): 98.3 (05 Jun 2025 05:22), Max: 98.8 (04 Jun 2025 13:41)  HR: 93 (05 Jun 2025 05:22) (93 - 120)  BP: 105/69 (05 Jun 2025 05:22) (105/69 - 156/91)  BP(mean): --  RR: 18 (05 Jun 2025 05:22) (17 - 18)  SpO2: 97% (05 Jun 2025 05:22) (97% - 98%)    Parameters below as of 05 Jun 2025 05:22  Patient On (Oxygen Delivery Method): room air        CONSTITUTIONAL: NAD; well-developed  HEENT: PERRL, clear conjunctiva  RESPIRATORY: Normal respiratory effort; lungs are clear to auscultation bilaterally; No crackles/rhonchi/wheezing  CARDIOVASCULAR: Regular rate and rhythm, normal S1 and S2, no murmur/rub/gallop; No lower extremity edema; Peripheral pulses are 2+ bilaterally  ABDOMEN: Nontender to palpation, normoactive bowel sounds, no rebound/guarding; No hepatosplenomegaly  MUSCULOSKELETAL: No clubbing or cyanosis of digits; no joint swelling or tenderness to palpation  EXTREMITY: Lower extremities non-tender to palpation; non-erythematous B/L  NEURO: A&Ox3; no focal deficits   PSYCH: Normal mood; affect appropriate    LABS:                        9.1    5.94  )-----------( 388      ( 05 Jun 2025 06:41 )             30.0     06-05    134[L]  |  102  |  24[H]  ----------------------------<  179[H]  4.3   |  19[L]  |  1.47[H]    Ca    8.8      05 Jun 2025 06:41  Phos  3.4     06-05  Mg     2.1     06-05    TPro  5.8[L]  /  Alb  2.9[L]  /  TBili  0.2  /  DBili  x   /  AST  113[H]  /  ALT  85[H]  /  AlkPhos  89  06-05          Urinalysis Basic - ( 05 Jun 2025 06:41 )    Color: x / Appearance: x / SG: x / pH: x  Gluc: 179 mg/dL / Ketone: x  / Bili: x / Urobili: x   Blood: x / Protein: x / Nitrite: x   Leuk Esterase: x / RBC: x / WBC x   Sq Epi: x / Non Sq Epi: x / Bacteria: x          RADIOLOGY & ADDITIONAL TESTS:    N/A

## 2025-06-05 NOTE — PROGRESS NOTE ADULT - TIME BILLING
Time-based billing (NON-critical care).     The necessity of the time spent during the encounter on this date of service was due to:     - Ordering, reviewing, and interpreting labs, testing, and imaging.  - Independently obtaining a review of systems and performing a physical exam  - Reviewing prior hospitalization and where necessary, outpatient records.  - Counselling and educating patient and family regarding interpretation of aforementioned items and plan of care.  - I have spent 55 minutes of time on the encounter which excludes teaching and separately reported services.
Time-based billing (NON-critical care).     The necessity of the time spent during the encounter on this date of service was due to:     - Ordering, reviewing, and interpreting labs, testing, and imaging.  - Independently obtaining a review of systems and performing a physical exam  - Reviewing prior hospitalization and where necessary, outpatient records.  - Counselling and educating patient and family regarding interpretation of aforementioned items and plan of care.  - I have spent 55 minutes of time on the encounter which excludes teaching and separately reported services.
discharge planning 65mins    Time-based billing (NON-critical care).     The necessity of the time spent during the encounter on this date of service was due to:     - Ordering, reviewing, and interpreting labs, testing, and imaging.  - Independently obtaining a review of systems and performing a physical exam  - Reviewing prior hospitalization and where necessary, outpatient records.  - Counselling and educating patient and family regarding interpretation of aforementioned items and plan of care.  - I have spent 65 minutes of time on the encounter which excludes teaching and separately reported services.

## 2025-06-05 NOTE — PROGRESS NOTE ADULT - NUTRITIONAL ASSESSMENT
This patient has been assessed with a concern for Malnutrition and has been determined to have a diagnosis/diagnoses of Moderate protein-calorie malnutrition.    This patient is being managed with:   Diet Regular-  Consistent Carbohydrate {Evening Snack} (CSTCHOSN)  Supplement Feeding Modality:  Oral  Ensure Max Cans or Servings Per Day:  1       Frequency:  Daily  Entered: Jun 4 2025  3:37PM  
Patient has not had much of an appetite while in the hospital.
Patient has not had much of an appetite while in the hospital.

## 2025-06-05 NOTE — PROGRESS NOTE ADULT - SUBJECTIVE AND OBJECTIVE BOX
DATE OF SERVICE: 06-05-25 @ 14:04    Patient is a 74y old  Male who presents with a chief complaint of fever, cough (05 Jun 2025 07:36)      INTERVAL HISTORY: Feels ok.     REVIEW OF SYSTEMS:  CONSTITUTIONAL: No weakness  EYES/ENT: No visual changes;  No throat pain   NECK: No pain or stiffness  RESPIRATORY: No cough, wheezing; No shortness of breath  CARDIOVASCULAR: No chest pain or palpitations  GASTROINTESTINAL: No abdominal  pain. No nausea, vomiting, or hematemesis  GENITOURINARY: No dysuria, frequency or hematuria  NEUROLOGICAL: No stroke like symptoms  SKIN: No rashes    	  MEDICATIONS:        PHYSICAL EXAM:  T(C): 37.1 (06-05-25 @ 09:31), Max: 37.1 (06-05-25 @ 09:31)  HR: 103 (06-05-25 @ 09:31) (93 - 113)  BP: 116/63 (06-05-25 @ 09:31) (105/69 - 156/91)  RR: 17 (06-05-25 @ 09:31) (17 - 18)  SpO2: 96% (06-05-25 @ 09:31) (96% - 98%)  Wt(kg): --  I&O's Summary    04 Jun 2025 07:01  -  05 Jun 2025 07:00  --------------------------------------------------------  IN: 250 mL / OUT: 1050 mL / NET: -800 mL    05 Jun 2025 07:01  -  05 Jun 2025 14:04  --------------------------------------------------------  IN: 240 mL / OUT: 0 mL / NET: 240 mL          Appearance: In no distress	  HEENT:    PERRL, EOMI	  Cardiovascular:  S1 S2, No JVD  Respiratory: Lungs clear to auscultation	  Gastrointestinal:  Soft, Non-tender, + BS	  Vascularature:  No edema of LE  Psychiatric: Appropriate affect   Neuro: no acute focal deficits                               9.1    5.94  )-----------( 388      ( 05 Jun 2025 06:41 )             30.0     06-05    134[L]  |  102  |  24[H]  ----------------------------<  179[H]  4.3   |  19[L]  |  1.47[H]    Ca    8.8      05 Jun 2025 06:41  Phos  3.4     06-05  Mg     2.1     06-05    TPro  5.8[L]  /  Alb  2.9[L]  /  TBili  0.2  /  DBili  x   /  AST  113[H]  /  ALT  85[H]  /  AlkPhos  89  06-05        Labs personally reviewed      ASSESSMENT/PLAN: 	    74M w/ CAD s/p PCI to the Cx in 2024, HTN, HLD and T2DM here for LLL PNA c/b LEIA on CKD      -cont DAPT asa and plavix   -cont losartan 100 mg qd   -cont pravastatin 80 mg qhs   -abx and treatment of PNA per primary team   -TTE unremarkable    Delphine Silva, AG-NP   Jose Portillo DO [facility]  Cardiovascular Medicine  [address] [place] [address], [address]  Available through call or text on [employer]  Office: [phone]

## 2025-06-05 NOTE — DISCHARGE NOTE NURSING/CASE MANAGEMENT/SOCIAL WORK - PATIENT PORTAL LINK FT
You can access the FollowMyHealth Patient Portal offered by [facility] by registering at the following website: [email or web address]. By joining [facility]’s FollowMyHealth portal, you will also be able to view your health information using other applications (apps) compatible with our system.

## 2025-06-05 NOTE — PROGRESS NOTE ADULT - PROBLEM SELECTOR PLAN 2
baseline serum cr ~ 1.2-1.4  serum cr on admission 2.15, downtrending  - continue with IVF  - monitor I/O  - hold losartan

## 2025-06-05 NOTE — DISCHARGE NOTE PROVIDER - NSDCFUADDAPPT_GEN_ALL_CORE_FT
APPTS ARE READY TO BE MADE: [ ] YES    Best Family or Patient Contact (if needed):    Additional Information about above appointments (if needed):    1: PCP for general health maintenance  2:   3:     Other comments or requests:

## 2025-06-05 NOTE — DISCHARGE NOTE PROVIDER - NSDCCPTREATMENT_GEN_ALL_CORE_FT
PRINCIPAL PROCEDURE  Procedure: CT of chest, abdomen, and pelvis  Findings and Treatment:   IMPRESSION:  Left lower lobe lung consolidation compatible with a pneumonia. Trace   left pleural effusion.  No acute intra-abdominal or pelvic findings.  Left adrenal myelolipoma left adrenal nodule with peripheral   calcification measuring up to 2 cm, unchanged, presumably an adenoma.   Right adrenal nodule measuring up to 1 cm. Nonemergent MRI of the abdomen   with adrenal protocol may be considered for better characterization.  Moderate to large stool burden in the colon.        SECONDARY PROCEDURE  Procedure: Complete transthoracic echocardiography (TTE)  Findings and Treatment: CONCLUSIONS:      1. Definity ultrasound enhancing agent was given for enhanced left ventricular opacification and improved delineation of the left ventricular endocardial borders. Endocardial resolution remains suboptimal despite the use of Definity.   2. Left ventricular cavity is normal in size. Left ventricular wall thickness is normal. Left ventricular systolic function is normal with an ejection fraction visually estimated at 60 to 65 %.There are probably no regional wall motion abnormalities.      Procedure: CXR, single AP view  Findings and Treatment: FINDINGS:  Partially visualized cervical spine hardware.  The heart is grossly unchanged in size.  Elevated left hemidiaphragm, similar to prior.  There is no pneumothorax or pleural effusion.  IMPRESSION:  No focal consolidation.

## 2025-06-05 NOTE — CHART NOTE - NSCHARTNOTEFT_GEN_A_CORE
Patient requires rolling walker due to diagnosis of sepsis/pneumonia for safe ambulation at discharge to assist with MRADLS.

## 2025-06-05 NOTE — DISCHARGE NOTE PROVIDER - HOSPITAL COURSE
HPI:  74M hx CAD s/p ZHOU to Lcx (last in 12/2024), HTN, DM2, presents with fever, cough, weakness since last Friday. No recent travel or sick contact. Cough is nonproductive and associated with burning sensation in chest with exertion, denies SOB, diarrhea, difficulty with urination. Last bowel movement was 2 days ago. No current chest pain.  (02 Jun 2025 10:07)    Hospital Course:  Patient was admitted to medicine for further management of severe sepsis secondary to Legionella pneumonia in his left lower lobe. His admission creatinine was elevated, indicating acute kidney injury superimposed on chronic kidney disease. He also complained of dizziness upon standing, although orthostatic vital signs were negative and a transthoracic echocardiogram showed no abnormalities. He was treated with azithromycin, and ceftriaxone was discontinued based on ID recommendations. He received IV fluids and continued his home medications for coronary artery disease (aspirin, clopidogrel, and a statin). Losartan was temporarily held due to his acute kidney injury. His blood glucose was managed with a sliding scale insulin regimen. Incidental bilateral adrenal nodules were found on CT imaging, and he was advised to follow up with his primary care physician. He was evaluated by physical therapy, and the discharge plan was for him to return home with home PT. On day of discharge, patient is clinically stable with no new exam findings or acute symptoms compared to prior. The patient was seen by the attending physician on the date of discharge and deemed stable and acceptable for discharge. The patient's chronic medical conditions were treated accordingly per the patient's home medication regimen. The patient's medication reconciliation (with changes made to chronic medications), follow up appointments, discharge orders, instructions, and significant lab and diagnostic studies are as noted.    Important Medication Changes and Reason:  START Azithromycin 500mg once per day for 3 days (6/6-6/8)    Active or Pending Issues Requiring Follow-up:  Legionella pneumonia  Adrenal Nodule  Advanced Directives:   [X] Full code  [ ] DNR  [ ] Hospice    Discharge Diagnoses:  Sepsis 2/2 Legionella pneumonia       HPI:  74M hx CAD s/p ZHOU to Lcx (last in 12/2024), HTN, DM2, presents with fever, cough, weakness since last Friday. No recent travel or sick contact. Cough is nonproductive and associated with burning sensation in chest with exertion, denies SOB, diarrhea, difficulty with urination. Last bowel movement was 2 days ago. No current chest pain.  (02 Jun 2025 10:07)    Hospital Course:  Patient was admitted to medicine for further management of severe sepsis secondary to Legionella pneumonia in his left lower lobe. His admission creatinine was elevated, indicating acute kidney injury superimposed on chronic kidney disease. He also complained of dizziness upon standing, although orthostatic vital signs were negative and a transthoracic echocardiogram showed no abnormalities. He was treated with azithromycin, and ceftriaxone was discontinued based on ID recommendations. He received IV fluids and continued his home medications for coronary artery disease (aspirin, clopidogrel, and a statin). Losartan was temporarily held due to his acute kidney injury. His blood glucose was managed with a sliding scale insulin regimen. Incidental bilateral adrenal nodules were found on CT imaging, and he was advised to follow up with his primary care physician. He was evaluated by physical therapy, and the discharge plan was for him to return home with home PT. On day of discharge, patient is clinically stable with no new exam findings or acute symptoms compared to prior. The patient was seen by the attending physician on the date of discharge and deemed stable and acceptable for discharge. The patient's chronic medical conditions were treated accordingly per the patient's home medication regimen. The patient's medication reconciliation (with changes made to chronic medications), follow up appointments, discharge orders, instructions, and significant lab and diagnostic studies are as noted.    Important Medication Changes and Reason:  START Azithromycin 500mg once per day for 3 days (6/6-6/8)    Active or Pending Issues Requiring Follow-up:  Legionella pneumonia  Adrenal Nodule  Advanced Directives:   [X] Full code  [ ] DNR  [ ] Hospice    Discharge Diagnoses:  Sepsis 2/2 Legionella pneumonia    Severe sepsis present on admission.   Acute Pneumonia, legionella.  Legionella PNA  Fever  Acute leukocytosis   Acute tachycardia   Acute hypoxic respiratory failure. Acute respiratory distress.   LEIA on CKD stage 3, suspected ATN in the setting of acute infection  CAD, unstable angina, rule out ACS. Acute chest pain.   DM type 2  Hypertension  Acute on chronic anemia  Acute hyponatremia   Acute metabolic acidosis with elevated lactate.  Physical deconditioning.          HPI:  74M hx CAD s/p ZHOU to Lcx (last in 12/2024), HTN, DM2, presents with fever, cough, weakness since last Friday. No recent travel or sick contact. Cough is nonproductive and associated with burning sensation in chest with exertion, denies SOB, diarrhea, difficulty with urination. Last bowel movement was 2 days ago. No current chest pain.  (02 Jun 2025 10:07)    Hospital Course:  Patient was admitted to medicine for further management of severe sepsis secondary to Legionella pneumonia in his left lower lobe. His admission creatinine was elevated, indicating acute kidney injury superimposed on chronic kidney disease. He also complained of dizziness upon standing, although orthostatic vital signs were negative and a transthoracic echocardiogram showed no abnormalities. He was treated with azithromycin, and ceftriaxone was discontinued based on ID recommendations. He received IV fluids and continued his home medications for coronary artery disease (aspirin, clopidogrel, and a statin). Losartan was temporarily held due to his acute kidney injury. His blood glucose was managed with a sliding scale insulin regimen. Incidental bilateral adrenal nodules were found on CT imaging, and he was advised to follow up with his primary care physician. He was evaluated by physical therapy, and the discharge plan was for him to return home with home PT. On day of discharge, patient is clinically stable with no new exam findings or acute symptoms compared to prior. The patient was seen by the attending physician on the date of discharge and deemed stable and acceptable for discharge. The patient's chronic medical conditions were treated accordingly per the patient's home medication regimen. The patient's medication reconciliation (with changes made to chronic medications), follow up appointments, discharge orders, instructions, and significant lab and diagnostic studies are as noted.    Important Medication Changes and Reason:  START Azithromycin 500mg once per day for 3 days (6/6-6/8)    Active or Pending Issues Requiring Follow-up:  Legionella pneumonia  Adrenal Nodule  Advanced Directives:   [X] Full code  [ ] DNR  [ ] Hospice    Discharge Diagnoses:  Sepsis 2/2 Legionella pneumonia    Severe sepsis present on admission.   Acute Pneumonia, legionella.  Legionella PNA  Fever  Acute leukocytosis   Acute tachycardia   Acute hypoxic respiratory failure. Acute respiratory distress.   LEIA on CKD stage 3, suspected ATN in the setting of acute infection  CAD, unstable angina, rule out ACS. Acute chest pain.   DM type 2  Hypertension  Acute on chronic anemia  Acute hyponatremia   Acute metabolic acidosis with elevated lactate.  Physical deconditioning. Impaired aerobic capacity/endurance; muscle strength; gait, locomotion, and balance.

## 2025-06-06 ENCOUNTER — TRANSCRIPTION ENCOUNTER (OUTPATIENT)
Age: 75
End: 2025-06-06

## 2025-06-07 LAB
CULTURE RESULTS: SIGNIFICANT CHANGE UP
CULTURE RESULTS: SIGNIFICANT CHANGE UP
SPECIMEN SOURCE: SIGNIFICANT CHANGE UP
SPECIMEN SOURCE: SIGNIFICANT CHANGE UP

## 2025-06-09 ENCOUNTER — APPOINTMENT (OUTPATIENT)
Dept: INTERNAL MEDICINE | Facility: CLINIC | Age: 75
End: 2025-06-09

## 2025-06-10 ENCOUNTER — TRANSCRIPTION ENCOUNTER (OUTPATIENT)
Age: 75
End: 2025-06-10

## 2025-06-12 ENCOUNTER — APPOINTMENT (OUTPATIENT)
Dept: INTERNAL MEDICINE | Facility: CLINIC | Age: 75
End: 2025-06-12
Payer: MEDICARE

## 2025-06-12 VITALS
TEMPERATURE: 97.7 F | BODY MASS INDEX: 23.56 KG/M2 | DIASTOLIC BLOOD PRESSURE: 68 MMHG | OXYGEN SATURATION: 99 % | HEART RATE: 115 BPM | WEIGHT: 138 LBS | HEIGHT: 64 IN | SYSTOLIC BLOOD PRESSURE: 140 MMHG

## 2025-06-12 VITALS — HEART RATE: 108 BPM | OXYGEN SATURATION: 99 %

## 2025-06-12 VITALS — DIASTOLIC BLOOD PRESSURE: 80 MMHG | SYSTOLIC BLOOD PRESSURE: 140 MMHG

## 2025-06-12 LAB
BASOPHILS # BLD AUTO: 0.04 K/UL
BASOPHILS NFR BLD AUTO: 0.4 %
DEPRECATED D DIMER PPP IA-ACNC: <150 NG/ML DDU
EOSINOPHIL # BLD AUTO: 0.26 K/UL
EOSINOPHIL NFR BLD AUTO: 2.7 %
FERRITIN SERPL-MCNC: 78 NG/ML
FOLATE SERPL-MCNC: 19.1 NG/ML
HCT VFR BLD CALC: 35.8 %
HGB BLD-MCNC: 10.2 G/DL
IMM GRANULOCYTES NFR BLD AUTO: 1.5 %
IRON SATN MFR SERPL: 18 %
IRON SERPL-MCNC: 71 UG/DL
LYMPHOCYTES # BLD AUTO: 1.88 K/UL
LYMPHOCYTES NFR BLD AUTO: 19.8 %
MAN DIFF?: NORMAL
MCHC RBC-ENTMCNC: 22.9 PG
MCHC RBC-ENTMCNC: 28.5 G/DL
MCV RBC AUTO: 80.4 FL
MONOCYTES # BLD AUTO: 0.9 K/UL
MONOCYTES NFR BLD AUTO: 9.5 %
NEUTROPHILS # BLD AUTO: 6.26 K/UL
NEUTROPHILS NFR BLD AUTO: 66.1 %
PLATELET # BLD AUTO: 833 K/UL
RBC # BLD: 4.45 M/UL
RBC # FLD: 16.5 %
T4 FREE SERPL-MCNC: 1.4 NG/DL
TIBC SERPL-MCNC: 403 UG/DL
TSH SERPL-ACNC: 2.69 UIU/ML
UIBC SERPL-MCNC: 332 UG/DL
VIT B12 SERPL-MCNC: 953 PG/ML
WBC # FLD AUTO: 9.48 K/UL

## 2025-06-12 PROCEDURE — 99496 TRANSJ CARE MGMT HIGH F2F 7D: CPT | Mod: 25

## 2025-06-12 PROCEDURE — 93000 ELECTROCARDIOGRAM COMPLETE: CPT

## 2025-06-13 ENCOUNTER — INPATIENT (INPATIENT)
Facility: HOSPITAL | Age: 75
LOS: 1 days | Discharge: HOME HEALTH | DRG: 684 | End: 2025-06-15
Attending: STUDENT IN AN ORGANIZED HEALTH CARE EDUCATION/TRAINING PROGRAM | Admitting: STUDENT IN AN ORGANIZED HEALTH CARE EDUCATION/TRAINING PROGRAM
Payer: MEDICARE

## 2025-06-13 ENCOUNTER — TRANSCRIPTION ENCOUNTER (OUTPATIENT)
Age: 75
End: 2025-06-13

## 2025-06-13 VITALS
TEMPERATURE: 98 F | WEIGHT: 138.01 LBS | RESPIRATION RATE: 17 BRPM | HEART RATE: 100 BPM | HEIGHT: 64 IN | OXYGEN SATURATION: 99 % | SYSTOLIC BLOOD PRESSURE: 156 MMHG | DIASTOLIC BLOOD PRESSURE: 91 MMHG

## 2025-06-13 DIAGNOSIS — N17.9 ACUTE KIDNEY FAILURE, UNSPECIFIED: ICD-10-CM

## 2025-06-13 DIAGNOSIS — I25.10 ATHEROSCLEROTIC HEART DISEASE OF NATIVE CORONARY ARTERY WITHOUT ANGINA PECTORIS: ICD-10-CM

## 2025-06-13 DIAGNOSIS — R00.0 TACHYCARDIA, UNSPECIFIED: ICD-10-CM

## 2025-06-13 DIAGNOSIS — M48.8X2 OTHER SPECIFIED SPONDYLOPATHIES, CERVICAL REGION: Chronic | ICD-10-CM

## 2025-06-13 DIAGNOSIS — E87.5 HYPERKALEMIA: ICD-10-CM

## 2025-06-13 DIAGNOSIS — Z95.5 PRESENCE OF CORONARY ANGIOPLASTY IMPLANT AND GRAFT: Chronic | ICD-10-CM

## 2025-06-13 DIAGNOSIS — I10 ESSENTIAL (PRIMARY) HYPERTENSION: ICD-10-CM

## 2025-06-13 DIAGNOSIS — D50.9 IRON DEFICIENCY ANEMIA, UNSPECIFIED: ICD-10-CM

## 2025-06-13 DIAGNOSIS — E11.9 TYPE 2 DIABETES MELLITUS WITHOUT COMPLICATIONS: ICD-10-CM

## 2025-06-13 DIAGNOSIS — E78.5 HYPERLIPIDEMIA, UNSPECIFIED: ICD-10-CM

## 2025-06-13 DIAGNOSIS — Z29.9 ENCOUNTER FOR PROPHYLACTIC MEASURES, UNSPECIFIED: ICD-10-CM

## 2025-06-13 LAB
ALBUMIN SERPL ELPH-MCNC: 4.3 G/DL
ALBUMIN SERPL ELPH-MCNC: 4.3 G/DL — SIGNIFICANT CHANGE UP (ref 3.3–5)
ALP BLD-CCNC: 86 U/L
ALP SERPL-CCNC: 82 U/L — SIGNIFICANT CHANGE UP (ref 40–120)
ALT FLD-CCNC: 32 U/L — SIGNIFICANT CHANGE UP (ref 10–45)
ALT SERPL-CCNC: 45 U/L
ANION GAP SERPL CALC-SCNC: 14 MMOL/L — SIGNIFICANT CHANGE UP (ref 5–17)
ANION GAP SERPL CALC-SCNC: 14 MMOL/L — SIGNIFICANT CHANGE UP (ref 5–17)
ANION GAP SERPL CALC-SCNC: 17 MMOL/L
ANION GAP SERPL CALC-SCNC: 18 MMOL/L — HIGH (ref 5–17)
AST SERPL-CCNC: 21 U/L — SIGNIFICANT CHANGE UP (ref 10–40)
AST SERPL-CCNC: 28 U/L
BASOPHILS # BLD AUTO: 0.05 K/UL — SIGNIFICANT CHANGE UP (ref 0–0.2)
BASOPHILS NFR BLD AUTO: 0.5 % — SIGNIFICANT CHANGE UP (ref 0–2)
BILIRUB SERPL-MCNC: 0.5 MG/DL
BILIRUB SERPL-MCNC: 0.8 MG/DL — SIGNIFICANT CHANGE UP (ref 0.2–1.2)
BUN SERPL-MCNC: 25 MG/DL — HIGH (ref 7–23)
BUN SERPL-MCNC: 28 MG/DL — HIGH (ref 7–23)
BUN SERPL-MCNC: 30 MG/DL — HIGH (ref 7–23)
BUN SERPL-MCNC: 33 MG/DL
CALCIUM SERPL-MCNC: 8.6 MG/DL — SIGNIFICANT CHANGE UP (ref 8.4–10.5)
CALCIUM SERPL-MCNC: 8.8 MG/DL — SIGNIFICANT CHANGE UP (ref 8.4–10.5)
CALCIUM SERPL-MCNC: 9.6 MG/DL — SIGNIFICANT CHANGE UP (ref 8.4–10.5)
CALCIUM SERPL-MCNC: 9.8 MG/DL
CHLORIDE SERPL-SCNC: 102 MMOL/L — SIGNIFICANT CHANGE UP (ref 96–108)
CHLORIDE SERPL-SCNC: 104 MMOL/L — SIGNIFICANT CHANGE UP (ref 96–108)
CHLORIDE SERPL-SCNC: 105 MMOL/L
CHLORIDE SERPL-SCNC: 106 MMOL/L — SIGNIFICANT CHANGE UP (ref 96–108)
CK SERPL-CCNC: 66 U/L
CO2 SERPL-SCNC: 18 MMOL/L — LOW (ref 22–31)
CO2 SERPL-SCNC: 19 MMOL/L
CREAT SERPL-MCNC: 1.95 MG/DL — HIGH (ref 0.5–1.3)
CREAT SERPL-MCNC: 2.06 MG/DL — HIGH (ref 0.5–1.3)
CREAT SERPL-MCNC: 2.14 MG/DL — HIGH (ref 0.5–1.3)
CREAT SERPL-MCNC: 2.38 MG/DL
EGFR: 31 ML/MIN/1.73M2 — LOW
EGFR: 31 ML/MIN/1.73M2 — LOW
EGFR: 33 ML/MIN/1.73M2 — LOW
EGFR: 33 ML/MIN/1.73M2 — LOW
EGFR: 35 ML/MIN/1.73M2 — LOW
EGFR: 35 ML/MIN/1.73M2 — LOW
EGFRCR SERPLBLD CKD-EPI 2021: 28 ML/MIN/1.73M2
EOSINOPHIL # BLD AUTO: 0.22 K/UL — SIGNIFICANT CHANGE UP (ref 0–0.5)
EOSINOPHIL NFR BLD AUTO: 2.2 % — SIGNIFICANT CHANGE UP (ref 0–6)
ESTIMATED AVERAGE GLUCOSE: 177 MG/DL
GAS PNL BLDV: SIGNIFICANT CHANGE UP
GLUCOSE BLDC GLUCOMTR-MCNC: 237 MG/DL — HIGH (ref 70–99)
GLUCOSE SERPL-MCNC: 112 MG/DL
GLUCOSE SERPL-MCNC: 115 MG/DL — HIGH (ref 70–99)
GLUCOSE SERPL-MCNC: 138 MG/DL — HIGH (ref 70–99)
GLUCOSE SERPL-MCNC: 98 MG/DL — SIGNIFICANT CHANGE UP (ref 70–99)
HBA1C MFR BLD HPLC: 7.8 %
HCT VFR BLD CALC: 36.5 % — LOW (ref 39–50)
HGB BLD-MCNC: 11 G/DL — LOW (ref 13–17)
IMM GRANULOCYTES NFR BLD AUTO: 1.1 % — HIGH (ref 0–0.9)
LYMPHOCYTES # BLD AUTO: 1.77 K/UL — SIGNIFICANT CHANGE UP (ref 1–3.3)
LYMPHOCYTES # BLD AUTO: 17.9 % — SIGNIFICANT CHANGE UP (ref 13–44)
MAGNESIUM SERPL-MCNC: 1.8 MG/DL — SIGNIFICANT CHANGE UP (ref 1.6–2.6)
MAGNESIUM SERPL-MCNC: 2 MG/DL
MCHC RBC-ENTMCNC: 23.2 PG — LOW (ref 27–34)
MCHC RBC-ENTMCNC: 30.1 G/DL — LOW (ref 32–36)
MCV RBC AUTO: 77 FL — LOW (ref 80–100)
MONOCYTES # BLD AUTO: 0.59 K/UL — SIGNIFICANT CHANGE UP (ref 0–0.9)
MONOCYTES NFR BLD AUTO: 6 % — SIGNIFICANT CHANGE UP (ref 2–14)
NEUTROPHILS # BLD AUTO: 7.13 K/UL — SIGNIFICANT CHANGE UP (ref 1.8–7.4)
NEUTROPHILS NFR BLD AUTO: 72.3 % — SIGNIFICANT CHANGE UP (ref 43–77)
NRBC BLD AUTO-RTO: 0 /100 WBCS — SIGNIFICANT CHANGE UP (ref 0–0)
PLATELET # BLD AUTO: 822 K/UL — HIGH (ref 150–400)
POTASSIUM SERPL-MCNC: 4.6 MMOL/L — SIGNIFICANT CHANGE UP (ref 3.5–5.3)
POTASSIUM SERPL-MCNC: 5.6 MMOL/L — HIGH (ref 3.5–5.3)
POTASSIUM SERPL-MCNC: 5.7 MMOL/L — HIGH (ref 3.5–5.3)
POTASSIUM SERPL-SCNC: 4.6 MMOL/L — SIGNIFICANT CHANGE UP (ref 3.5–5.3)
POTASSIUM SERPL-SCNC: 5.6 MMOL/L — HIGH (ref 3.5–5.3)
POTASSIUM SERPL-SCNC: 5.7 MMOL/L — HIGH (ref 3.5–5.3)
POTASSIUM SERPL-SCNC: 6.7 MMOL/L
PROT SERPL-MCNC: 7.1 G/DL
PROT SERPL-MCNC: 7.3 G/DL — SIGNIFICANT CHANGE UP (ref 6–8.3)
RBC # BLD: 4.74 M/UL — SIGNIFICANT CHANGE UP (ref 4.2–5.8)
RBC # FLD: 16.2 % — HIGH (ref 10.3–14.5)
SODIUM SERPL-SCNC: 136 MMOL/L — SIGNIFICANT CHANGE UP (ref 135–145)
SODIUM SERPL-SCNC: 138 MMOL/L — SIGNIFICANT CHANGE UP (ref 135–145)
SODIUM SERPL-SCNC: 138 MMOL/L — SIGNIFICANT CHANGE UP (ref 135–145)
SODIUM SERPL-SCNC: 140 MMOL/L
TSH SERPL-MCNC: 2.43 UIU/ML — SIGNIFICANT CHANGE UP (ref 0.27–4.2)
WBC # BLD: 9.87 K/UL — SIGNIFICANT CHANGE UP (ref 3.8–10.5)
WBC # FLD AUTO: 9.87 K/UL — SIGNIFICANT CHANGE UP (ref 3.8–10.5)

## 2025-06-13 PROCEDURE — 76770 US EXAM ABDO BACK WALL COMP: CPT | Mod: 26

## 2025-06-13 PROCEDURE — 71250 CT THORAX DX C-: CPT | Mod: 26

## 2025-06-13 PROCEDURE — 99223 1ST HOSP IP/OBS HIGH 75: CPT

## 2025-06-13 PROCEDURE — 99285 EMERGENCY DEPT VISIT HI MDM: CPT

## 2025-06-13 RX ORDER — CLOPIDOGREL BISULFATE 75 MG/1
75 TABLET, FILM COATED ORAL DAILY
Refills: 0 | Status: DISCONTINUED | OUTPATIENT
Start: 2025-06-13 | End: 2025-06-15

## 2025-06-13 RX ORDER — DEXTROSE 50 % IN WATER 50 %
15 SYRINGE (ML) INTRAVENOUS ONCE
Refills: 0 | Status: DISCONTINUED | OUTPATIENT
Start: 2025-06-13 | End: 2025-06-15

## 2025-06-13 RX ORDER — SODIUM CHLORIDE 9 G/1000ML
1000 INJECTION, SOLUTION INTRAVENOUS
Refills: 0 | Status: DISCONTINUED | OUTPATIENT
Start: 2025-06-13 | End: 2025-06-14

## 2025-06-13 RX ORDER — TAMSULOSIN HYDROCHLORIDE 0.4 MG/1
0.4 CAPSULE ORAL AT BEDTIME
Refills: 0 | Status: DISCONTINUED | OUTPATIENT
Start: 2025-06-13 | End: 2025-06-15

## 2025-06-13 RX ORDER — ATORVASTATIN CALCIUM 80 MG/1
20 TABLET, FILM COATED ORAL AT BEDTIME
Refills: 0 | Status: DISCONTINUED | OUTPATIENT
Start: 2025-06-13 | End: 2025-06-15

## 2025-06-13 RX ORDER — INSULIN LISPRO 100 U/ML
INJECTION, SOLUTION INTRAVENOUS; SUBCUTANEOUS
Refills: 0 | Status: DISCONTINUED | OUTPATIENT
Start: 2025-06-13 | End: 2025-06-15

## 2025-06-13 RX ORDER — ACETAMINOPHEN 500 MG/5ML
650 LIQUID (ML) ORAL EVERY 6 HOURS
Refills: 0 | Status: DISCONTINUED | OUTPATIENT
Start: 2025-06-13 | End: 2025-06-15

## 2025-06-13 RX ORDER — SODIUM CHLORIDE 9 G/1000ML
1000 INJECTION, SOLUTION INTRAVENOUS
Refills: 0 | Status: DISCONTINUED | OUTPATIENT
Start: 2025-06-13 | End: 2025-06-15

## 2025-06-13 RX ORDER — ENOXAPARIN SODIUM 100 MG/ML
40 INJECTION SUBCUTANEOUS EVERY 24 HOURS
Refills: 0 | Status: DISCONTINUED | OUTPATIENT
Start: 2025-06-13 | End: 2025-06-15

## 2025-06-13 RX ORDER — SODIUM ZIRCONIUM CYCLOSILICATE 5 G/5G
10 POWDER, FOR SUSPENSION ORAL ONCE
Refills: 0 | Status: COMPLETED | OUTPATIENT
Start: 2025-06-13 | End: 2025-06-13

## 2025-06-13 RX ORDER — DEXTROSE 50 % IN WATER 50 %
12.5 SYRINGE (ML) INTRAVENOUS ONCE
Refills: 0 | Status: DISCONTINUED | OUTPATIENT
Start: 2025-06-13 | End: 2025-06-15

## 2025-06-13 RX ORDER — MOMETASONE FUROATE 1 MG/G
1 OINTMENT TOPICAL
Refills: 0 | DISCHARGE

## 2025-06-13 RX ORDER — MELATONIN 5 MG
3 TABLET ORAL AT BEDTIME
Refills: 0 | Status: DISCONTINUED | OUTPATIENT
Start: 2025-06-13 | End: 2025-06-15

## 2025-06-13 RX ORDER — GLUCAGON 3 MG/1
1 POWDER NASAL ONCE
Refills: 0 | Status: DISCONTINUED | OUTPATIENT
Start: 2025-06-13 | End: 2025-06-15

## 2025-06-13 RX ORDER — TAMSULOSIN HYDROCHLORIDE 0.4 MG/1
1 CAPSULE ORAL
Refills: 0 | DISCHARGE

## 2025-06-13 RX ORDER — DEXTROSE 50 % IN WATER 50 %
25 SYRINGE (ML) INTRAVENOUS ONCE
Refills: 0 | Status: DISCONTINUED | OUTPATIENT
Start: 2025-06-13 | End: 2025-06-15

## 2025-06-13 RX ORDER — INSULIN LISPRO 100 U/ML
INJECTION, SOLUTION INTRAVENOUS; SUBCUTANEOUS AT BEDTIME
Refills: 0 | Status: DISCONTINUED | OUTPATIENT
Start: 2025-06-13 | End: 2025-06-15

## 2025-06-13 RX ORDER — EZETIMIBE 10 MG/1
10 TABLET ORAL DAILY
Refills: 0 | Status: DISCONTINUED | OUTPATIENT
Start: 2025-06-13 | End: 2025-06-15

## 2025-06-13 RX ORDER — DEXTROSE 50 % IN WATER 50 %
50 SYRINGE (ML) INTRAVENOUS ONCE
Refills: 0 | Status: COMPLETED | OUTPATIENT
Start: 2025-06-13 | End: 2025-06-13

## 2025-06-13 RX ORDER — ASPIRIN 325 MG
81 TABLET ORAL DAILY
Refills: 0 | Status: DISCONTINUED | OUTPATIENT
Start: 2025-06-13 | End: 2025-06-15

## 2025-06-13 RX ADMIN — Medication 5 UNIT(S): at 14:56

## 2025-06-13 RX ADMIN — Medication 50 MILLILITER(S): at 14:54

## 2025-06-13 RX ADMIN — Medication 1000 MILLILITER(S): at 11:23

## 2025-06-13 RX ADMIN — TAMSULOSIN HYDROCHLORIDE 0.4 MILLIGRAM(S): 0.4 CAPSULE ORAL at 23:47

## 2025-06-13 RX ADMIN — ENOXAPARIN SODIUM 40 MILLIGRAM(S): 100 INJECTION SUBCUTANEOUS at 23:47

## 2025-06-13 RX ADMIN — ATORVASTATIN CALCIUM 20 MILLIGRAM(S): 80 TABLET, FILM COATED ORAL at 23:47

## 2025-06-13 RX ADMIN — Medication 1000 MILLILITER(S): at 14:54

## 2025-06-13 RX ADMIN — SODIUM CHLORIDE 100 MILLILITER(S): 9 INJECTION, SOLUTION INTRAVENOUS at 23:47

## 2025-06-13 RX ADMIN — SODIUM ZIRCONIUM CYCLOSILICATE 10 GRAM(S): 5 POWDER, FOR SUSPENSION ORAL at 12:03

## 2025-06-13 NOTE — H&P ADULT - PROBLEM SELECTOR PLAN 5
- On losartan 100mg daily  - Hold for now in setting of LEIA - Has stents placed in the past  - C/w Plavix 75mg daily - Has stents placed in the past  - C/w ASA 81mg daily  - C/w Plavix 75mg daily

## 2025-06-13 NOTE — PATIENT PROFILE ADULT - FALL HARM RISK - RISK INTERVENTIONS

## 2025-06-13 NOTE — ED PROVIDER NOTE - CLINICAL SUMMARY MEDICAL DECISION MAKING FREE TEXT BOX
74-year-old male past medical history of CAD, diabetes, hypertension, hyperlipidemia, with recent admission for left lower lobe pneumonia discharged on June 5, 8 days ago, presenting due to tachycardia hyperkalemia found outpatient.  Patient states since discharge she is felt better, appetite mildly tired.  Denies any shortness of breath, chest pain, fevers, chills, body aches.    Patient is well-appearing in the emergency department.  AO x 3 and afebrile.  Hemodynamically stable.  Patient has tachycardia but normal rhythm and no murmurs, rubs, or gallops.  Patient has decreased lung sounds to the left lower lobe.  No wheezes, rales, or rhonchi.  Concern for pleural effusion.  Will also evaluate for any signs of [place] abnormalities.  Will order CBC, CMP, magnesium, VBG, and CT chest.

## 2025-06-13 NOTE — H&P ADULT - PROBLEM SELECTOR PLAN 4
- Has stents placed in the past  - C/w Plavix 75mg daily - Hb 11, MCV 77  - Per OP labs, patient seems to be having worsening microcytosis over the past few months  - F/u iron, TIBC, ferritin, transferrin  - Recent colonoscopy? - Hb 11, MCV 77  - Per OP labs, patient seems to be having worsening microcytosis over the past few months  - F/u iron, TIBC, ferritin, transferrin  - Patient states that he hasn't had much of an appetite recently  - Last colonoscopy >5 years ago per patient  - Will need to f/u w/ GI for outpatient colonoscopy - Hb 11, MCV 77  - Per OP labs, patient seems to be having worsening microcytosis over the past few months  - Had iron studies done on 6/12 - iron 71, ferritin 78, TIBC 403   - Patient states that he hasn't had much of an appetite recently  - Last colonoscopy >5 years ago per patient  - Will need to f/u w/ GI for outpatient colonoscopy

## 2025-06-13 NOTE — H&P ADULT - PROBLEM SELECTOR PLAN 3
- HR here max 101, ECG personally reviewed - normal sinus rhythm, no ST elevations, depression or TWIs  - Patient himself denies any new symptoms that have occurred since discharge  - Platelet count has increased to 822 since discharge last week  - Will order D-Dimer, if elevated above age-adjusted range, may need to check V/Q scan - HR here max 101, ECG personally reviewed - normal sinus rhythm, no ST elevations, depression or TWIs  - Patient states he was tachycardic to 120 at the office, and has also felt palpitations while walking  - Platelet count has increased to 822 since discharge last week  - Will order D-Dimer, if elevated above age-adjusted range, will need to check CTA chest to r/o PE given thrombocytosis as well as unexplained microcytic anemia which is concerning for occult malignancy  - Explained risk/benefit of administering IV contrast in setting of LEIA on CKD to patient, explained that risk of missing a PE is greater than the risk of contrast injury - HR here max 101, ECG personally reviewed - normal sinus rhythm, no ST elevations, depression or TWIs  - Patient states he was tachycardic to 120 at the office, and has also felt palpitations while walking  - Platelet count has increased to 822 since discharge last week  - Will order D-Dimer, if elevated above age-adjusted range, will need to check CTA chest to r/o PE given thrombocytosis as well as unexplained microcytic anemia which is concerning for occult malignancy -> D-dimer 151, making VTE unlikely when adjusting for age, will forgo further PE workup for now  - Continue to monitor HR

## 2025-06-13 NOTE — ED PROVIDER NOTE - PROGRESS NOTE DETAILS
19
I spoke with the hospitalist who agreed to admit the patient for LEIA.    Messi Hensley MD (PGY 2)

## 2025-06-13 NOTE — H&P ADULT - HISTORY OF PRESENT ILLNESS
This is a 75 y/o male w/ PMHx CAD s/p PCI, HTN, T2DM, CKD3, and recent admission for legionella pneumonia wq/ associated LEIA on CKD presenting for hyperkalemia on outpatient labs. He was admitted here from 6/2-5 after presenting for cough and weakness, found to meet severe sepsis criteria w/ LLL PNA, found to be positive for legionella. Was discharged on 6/5, had to finish a PO course of azithromycin outpatient that ended on 6/8. He's been well since discharge, went to f/u w/ his PCP, and was found to be tachycardic as well as hyperkalemic (level unknown), so was sent to the ED for further evaluation.    In the ED, he was afebrile and hemodyamically stable, slightly tachycardic to 101, saturating well on RA. CBC w/ microcytic anemia at Hb 11 (better than discharge) and thrombocytosis of 822. CMP w/ hyperkalemia of 5.6 and LEIA on CKD w/ Cr 2.14, 5.7 and 2.06 on repeat. CT Chest w/o IV contrast showing decreased LLL consolidation and elevated L hemidiaphragm. Received 2L IVNS, and insulin shift w/ lokelma 10g in the ED, after which K normalized to 4.6 and Cr came down to 1.95.  This is a 73 y/o male w/ PMHx CAD s/p PCI, HTN, T2DM, CKD3, and recent admission for legionella pneumonia wq/ associated LEIA on CKD presenting for hyperkalemia on outpatient labs. He was admitted here from 6/2-5 after presenting for cough and weakness, found to meet severe sepsis criteria w/ LLL PNA, found to be positive for legionella. Was discharged on 6/5, had to finish a PO course of azithromycin outpatient that ended on 6/8. He's been well since discharge, went to f/u w/ his PCP, and was found to be tachycardic as well as hyperkalemic to 6.7 and w/ Cr of 2.38, so was sent to the ED for further evaluation.    In the ED, he was afebrile and hemodyamically stable, slightly tachycardic to 101, saturating well on RA. CBC w/ microcytic anemia at Hb 11 (better than discharge) and thrombocytosis of 822. CMP w/ hyperkalemia of 5.6 and LEIA on CKD w/ Cr 2.14, 5.7 and 2.06 on repeat. CT Chest w/o IV contrast showing decreased LLL consolidation and elevated L hemidiaphragm. Received 2L IVNS, and insulin shift w/ lokelma 10g in the ED, after which K normalized to 4.6 and Cr came down to 1.95.  This is a 75 y/o male w/ PMHx CAD s/p PCI, HTN, T2DM, CKD3, and recent admission for legionella pneumonia wq/ associated LEIA on CKD presenting for hyperkalemia on outpatient labs. He was admitted here from 6/2-5 after presenting for cough and weakness, found to meet severe sepsis criteria w/ LLL PNA, found to be positive for legionella. Was discharged on 6/5, had to finish a PO course of azithromycin outpatient that ended on 6/8. He's been mostly well since discharge, but felt that his heart has been beating fast when he walks. Went to f/u w/ his PCP, and was found to be tachycardic to 120, as well as hyperkalemic to 6.7 w/ Cr of 2.38, so was sent to the ED for further evaluation.    In the ED, he was afebrile and hemodyamically stable, slightly tachycardic to 101, saturating well on RA. CBC w/ microcytic anemia at Hb 11 (better than discharge) and thrombocytosis of 822. CMP w/ hyperkalemia of 5.6 and LEIA on CKD w/ Cr 2.14, 5.7 and 2.06 on repeat. CT Chest w/o IV contrast showing decreased LLL consolidation and elevated L hemidiaphragm. Received 2L IVNS, and insulin shift w/ lokelma 10g in the ED, after which K normalized to 4.6 and Cr came down to 1.95.

## 2025-06-13 NOTE — H&P ADULT - ASSESSMENT
73 y/o male w/ PMHx CAD s/p PCI, HTN, HLD, T2DM, CKD3, and recent admission for legionella pneumonia wq/ associated LEIA on CKD presenting for hyperkalemia on outpatient labs and tachycardia. Here w/ HR of max 101, hyperkalemic to 5.7 and LEIA w/ Cr 2.14. K and Cr responding to insulin shift and fluids. Admitted for LEIA on CKD3.

## 2025-06-13 NOTE — H&P ADULT - PROBLEM SELECTOR PLAN 1
- Cr 2.14 on arrival, now down to 1.95 after 2L IVNS  - Baseline prior to June this y - Cr 2.14 on arrival, now down to 1.95 after 2L IVNS  - Baseline on OP labs in April was 1.47  - Given improvement w/ IVF may have pre-renal component but patient without any obvious sources of what may cause a pre-renal LEIA since discharge as he isn't fluid down, anemia has improved  - Check US kidney bladder and urine electrolytes  - Monitor urine output  - Hold home losartan  - Avoid other nephrotoxic medications and dose meds according to GFR - Cr 2.14 on arrival, now down to 1.95 after 2L IVNS  - Baseline on OP labs in April was 1.47  - Given improvement w/ IVF may have pre-renal component but patient without any obvious sources of what may cause a pre-renal LEIA since discharge as he isn't fluid down, anemia has improved  - Check US kidney bladder and urine electrolytes  - Monitor urine output  - Hold home losartan  - Avoid other nephrotoxic medications and dose meds according to GFR  - LR 100cc/hr for now

## 2025-06-13 NOTE — H&P ADULT - NSHPREVIEWOFSYSTEMS_GEN_ALL_CORE
Review of Systems:   CONSTITUTIONAL: No fever, weight loss  EYES: No eye pain, visual disturbances, or discharge  RESPIRATORY: No SOB. No cough, wheezing, chills or hemoptysis  CARDIOVASCULAR: No chest pain, palpitations, dizziness, or leg swelling  GASTROINTESTINAL: No abdominal or epigastric pain. No nausea, vomiting, or hematemesis; No diarrhea or constipation. No melena or hematochezia.  GENITOURINARY: No dysuria, frequency, hematuria, or incontinence  NEUROLOGICAL: No headaches, memory loss, loss of strength, numbness, or tremors  SKIN: No itching, burning, rashes, or lesions   MUSCULOSKELETAL: No joint pain or swelling; No muscle, back pain  PSYCHIATRIC: No depression, anxiety, mood swings, or difficulty sleeping  HEME/LYMPH: No easy bruising, or bleeding gums Review of Systems:   CONSTITUTIONAL: No fever, weight loss  EYES: No eye pain, visual disturbances, or discharge  RESPIRATORY: No SOB. No cough, wheezing, chills or hemoptysis  CARDIOVASCULAR: +palpitations; No chest pain, dizziness, or leg swelling  GASTROINTESTINAL: No abdominal or epigastric pain. No nausea, vomiting, or hematemesis; No diarrhea or constipation. No melena or hematochezia.  GENITOURINARY: No dysuria, frequency, hematuria, or incontinence  NEUROLOGICAL: No headaches, memory loss, loss of strength, numbness, or tremors  SKIN: No itching, burning, rashes, or lesions   MUSCULOSKELETAL: No joint pain or swelling; No muscle, back pain  PSYCHIATRIC: No depression, anxiety, mood swings, or difficulty sleeping  HEME/LYMPH: No easy bruising, or bleeding gums

## 2025-06-13 NOTE — H&P ADULT - PROBLEM SELECTOR PLAN 6
- On pravastatin 80mg QHS  - C/w atorvastatin equivalent  - C/w ezetimibe 10mg daily - On losartan 100mg daily  - Hold for now in setting of LEIA

## 2025-06-13 NOTE — ED ADULT NURSE NOTE - OBJECTIVE STATEMENT
74y male, AAOx4, PMH **, presents to ED for elevated potassium on outpt labs, recent admission for PNA, denies headache, chest pain, shortness of breath, abdominal pain, on cm, 20g right ac, placed in gown, side rails up for safety, bed in lowest position, call bell within reach, patient and family educated on plan of care, comfort and safety provided. 74y male, AAOx4, PMH CAD, DM2, HTN,  presents to ED for elevated potassium on outpt labs, recent admission for PNA, denies headache, chest pain, shortness of breath, abdominal pain, on cm, 20g right ac, placed in gown, side rails up for safety, bed in lowest position, call bell within reach, patient and family educated on plan of care, comfort and safety provided.

## 2025-06-13 NOTE — H&P ADULT - NSHPPHYSICALEXAM_GEN_ALL_CORE
Vital Signs Last 24 Hrs  T(C): 36.6 (13 Jun 2025 21:14), Max: 36.7 (13 Jun 2025 17:12)  T(F): 97.9 (13 Jun 2025 21:14), Max: 98 (13 Jun 2025 17:12)  HR: 90 (13 Jun 2025 21:14) (62 - 101)  BP: 150/74 (13 Jun 2025 21:14) (144/72 - 181/92)  BP(mean): --  RR: 18 (13 Jun 2025 21:14) (16 - 18)  SpO2: 100% (13 Jun 2025 21:14) (96% - 100%)    Parameters below as of 13 Jun 2025 21:14  Patient On (Oxygen Delivery Method): room air        CONSTITUTIONAL: Well-groomed, in no apparent distress  EYES: No conjunctival or scleral injection, non-icteric;   NECK: Trachea midline without palpable neck mass  RESPIRATORY: Breathing comfortably; lungs CTA without wheeze/rhonchi/rales  CARDIOVASCULAR: +S1S2, RRR, no M/G/R; no lower extremity edema  GASTROINTESTINAL: No palpable masses or tenderness, +BS throughout, no rebound/guarding  SKIN: No rashes or ulcers noted  NEUROLOGIC: Sensation intact in LEs b/l to light touch  PSYCHIATRIC: A+O x 3; mood and affect appropriate; appropriate insight and judgment Vital Signs Last 24 Hrs  T(C): 36.6 (13 Jun 2025 21:14), Max: 36.7 (13 Jun 2025 17:12)  T(F): 97.9 (13 Jun 2025 21:14), Max: 98 (13 Jun 2025 17:12)  HR: 90 (13 Jun 2025 21:14) (62 - 101)  BP: 150/74 (13 Jun 2025 21:14) (144/72 - 181/92)  BP(mean): --  RR: 18 (13 Jun 2025 21:14) (16 - 18)  SpO2: 100% (13 Jun 2025 21:14) (96% - 100%)    Parameters below as of 13 Jun 2025 21:14  Patient On (Oxygen Delivery Method): room air        CONSTITUTIONAL: Well-groomed, in no apparent distress  EYES: No conjunctival or scleral injection, non-icteric;   NECK: Trachea midline without palpable neck mass  RESPIRATORY: Breathing comfortably; lungs CTA without wheeze/rhonchi/rales  CARDIOVASCULAR: +S1S2, mildly tachycardic but regular rhythm, no M/G/R; no lower extremity edema  GASTROINTESTINAL: No palpable masses or tenderness, +BS throughout, no rebound/guarding  SKIN: No rashes or ulcers noted  NEUROLOGIC: Sensation intact in LEs b/l to light touch  PSYCHIATRIC: A+O x 3; mood and affect appropriate; appropriate insight and judgment

## 2025-06-13 NOTE — ED PROVIDER NOTE - ATTENDING CONTRIBUTION TO CARE
Attending MD Ruiz: Subjective:    - Chief Complaint (CC): 74-year-old male with elevated heart rate and potassium found at outpatient follow-up after recent hospitalization for pneumonia.    - History of Present Illness: The patient is a 74-year-old male with a history of coronary artery disease (CAD), diabetes, hypertension, and hyperlipidemia. He was recently discharged on June 6th, approximately two weeks ago, for lower-level pneumonia. During a follow-up visit at his outpatient office, he was found to have an elevated heart rate and elevated potassium level, prompting referral to our facility. The exact potassium level from the outpatient visit is unknown. The patient reports feeling fine since discharge, with only a slight increase in fatigue. He denies chest pain, shortness of breath, or fevers at home. His appetite and fluid intake have been normal.    - History: The patient has a significant medical history including coronary artery disease, diabetes, hypertension, and hyperlipidemia. He was recently hospitalized for lower-level pneumonia, discharged on June 6th. There is no mention of past surgical history or family history. Social history is not provided in the given information.     Review of Systems:    -  General: Reports feeling 'a little bit more tired than usual'    -  Cardiovascular: Denies chest pain    -  Respiratory: Denies shortness of breath    -  Gastrointestinal: Reports eating and drinking okay    -  Constitutional: Denies fevers at home     Objective:    - Vital Signs:      - Heart rate: Initially 113 bpm, decreased to approximately 101 bpm      - Oxygen saturation: Breathing well on room air (specific value not provided)    - Physical Examination (PE):      - Cardiovascular: EKG shows normal sinus rhythm      - Respiratory: Diminished breath sounds in the left lower lobe, no crackles noted. Rest of the lung sounds are normal.     Differential Diagnosis:    - Persistent pneumonia, Electrolyte imbalance, Cardiac arrhythmia, Essential thrombocythemia, Reactive thrombocytosis     Assessment and Plan:      - Problem 1: Electrolyte Imbalance    - Assessment/Plan: Patient was referred for elevated potassium, though the exact value from the outpatient visit is unknown. Current VBG shows potassium of 5.6, which is mildly elevated.      - Obtain comprehensive metabolic panel (CMP) to assess electrolytes and renal function       - Review medication list for potential causes of hyperkalemia       - Consider ECG to evaluate for hyperkalemic changes       - Repeat potassium level to trend     - Problem 2: Thrombocytosis    - Assessment/Plan: Platelet count is significantly elevated at 822, which is unusually high and requires further investigation.      - Repeat complete blood count (CBC) to confirm thrombocytosis       - If confirmed, consider peripheral blood smear       - Evaluate for underlying causes (infection, inflammation, iron deficiency)       - Consider hematology consult if thrombocytosis persists or is severe     - Problem 4: Recent Pneumonia    - Assessment/Plan: Patient was recently discharged for lower-level pneumonia. Current examination shows diminished breath sounds in the left lower lobe, which may indicate residual effects or incomplete resolution of the pneumonia.      - Obtain chest X-ray to assess for residual infiltrates or new findings       - Consider repeating blood cultures if there's concern for ongoing infection       - Review recent antibiotic course and ensure completion       - Assess need for additional respiratory treatments (e.g., incentive spirometry, chest physiotherapy)

## 2025-06-13 NOTE — ED ADULT TRIAGE NOTE - BP NONINVASIVE DIASTOLIC (MM HG)
No abnormalities 91 No abnormalities No abnormalities No abnormalities No abnormalities No abnormalities

## 2025-06-13 NOTE — PATIENT PROFILE ADULT - NSPROREFERSVCHOMEDIABETES_GEN_A_NUR
States that he forgot to address with Dr. Mccurdy at appointment yesterday.   Sleep number bed/firmer mattress something to consider?  Cymbalta/gabapentin 300 four times daily states four daily-\" advised to take two at night. States that if he takes two at night causes him to be \"loopy\"  States that has trouble at night with pain.   States that he is currently taking 1 morning and 1 night is not taking four times daily.  Will address with Dr. Mccurdy.       no

## 2025-06-13 NOTE — H&P ADULT - PROBLEM SELECTOR PLAN 2
- K of 5.7 on arrival  - S/p insulin shift w/ lokelma, now down to 4.6  - Continue to monitor potassium levels - K of 5.7 on arrival, hyperkalemic to 6.7 on OP labs  - S/p insulin shift w/ lokelma, now down to 4.6  - Continue to monitor potassium levels  - No peaked T-waves on ECG

## 2025-06-13 NOTE — H&P ADULT - NSHPLABSRESULTS_GEN_ALL_CORE
11.0   9.87  )-----------( 822      ( 13 Jun 2025 11:23 )             36.5     06-13    138  |  106  |  25[H]  ----------------------------<  115[H]  4.6   |  18[L]  |  1.95[H]    Ca    8.8      13 Jun 2025 16:21  Mg     1.8     06-13    TPro  7.3  /  Alb  4.3  /  TBili  0.8  /  DBili  x   /  AST  21  /  ALT  32  /  AlkPhos  82  06-13          LIVER FUNCTIONS - ( 13 Jun 2025 11:23 )  Alb: 4.3 g/dL / Pro: 7.3 g/dL / ALK PHOS: 82 U/L / ALT: 32 U/L / AST: 21 U/L / GGT: x             Urinalysis Basic - ( 13 Jun 2025 16:21 )    Color: x / Appearance: x / SG: x / pH: x  Gluc: 115 mg/dL / Ketone: x  / Bili: x / Urobili: x   Blood: x / Protein: x / Nitrite: x   Leuk Esterase: x / RBC: x / WBC x   Sq Epi: x / Non Sq Epi: x / Bacteria: x

## 2025-06-13 NOTE — H&P ADULT - PROBLEM SELECTOR PLAN 7
- Takes metformin 1000mg BID  - ISS while admitted, adjust as needed - On pravastatin 80mg QHS  - C/w atorvastatin equivalent  - C/w ezetimibe 10mg daily - On pravastatin 80mg QHS  - C/w atorvastatin 20mg QHS as equivalent  - C/w ezetimibe 10mg daily

## 2025-06-14 LAB
ALBUMIN SERPL ELPH-MCNC: 3.3 G/DL — SIGNIFICANT CHANGE UP (ref 3.3–5)
ALP SERPL-CCNC: 65 U/L — SIGNIFICANT CHANGE UP (ref 40–120)
ALT FLD-CCNC: 20 U/L — SIGNIFICANT CHANGE UP (ref 10–45)
ANION GAP SERPL CALC-SCNC: 12 MMOL/L — SIGNIFICANT CHANGE UP (ref 5–17)
ANION GAP SERPL CALC-SCNC: 13 MMOL/L — SIGNIFICANT CHANGE UP (ref 5–17)
APPEARANCE UR: ABNORMAL
AST SERPL-CCNC: 16 U/L — SIGNIFICANT CHANGE UP (ref 10–40)
BACTERIA # UR AUTO: NEGATIVE /HPF — SIGNIFICANT CHANGE UP
BILIRUB SERPL-MCNC: 0.7 MG/DL — SIGNIFICANT CHANGE UP (ref 0.2–1.2)
BILIRUB UR-MCNC: NEGATIVE — SIGNIFICANT CHANGE UP
BUN SERPL-MCNC: 22 MG/DL — SIGNIFICANT CHANGE UP (ref 7–23)
BUN SERPL-MCNC: 22 MG/DL — SIGNIFICANT CHANGE UP (ref 7–23)
CALCIUM SERPL-MCNC: 8.8 MG/DL — SIGNIFICANT CHANGE UP (ref 8.4–10.5)
CALCIUM SERPL-MCNC: 9.3 MG/DL — SIGNIFICANT CHANGE UP (ref 8.4–10.5)
CAST: 0 /LPF — SIGNIFICANT CHANGE UP (ref 0–4)
CHLORIDE SERPL-SCNC: 102 MMOL/L — SIGNIFICANT CHANGE UP (ref 96–108)
CHLORIDE SERPL-SCNC: 107 MMOL/L — SIGNIFICANT CHANGE UP (ref 96–108)
CO2 SERPL-SCNC: 19 MMOL/L — LOW (ref 22–31)
CO2 SERPL-SCNC: 20 MMOL/L — LOW (ref 22–31)
COLOR SPEC: YELLOW — SIGNIFICANT CHANGE UP
CREAT ?TM UR-MCNC: 30 MG/DL — SIGNIFICANT CHANGE UP
CREAT SERPL-MCNC: 1.85 MG/DL — HIGH (ref 0.5–1.3)
CREAT SERPL-MCNC: 2.02 MG/DL — HIGH (ref 0.5–1.3)
D DIMER BLD IA.RAPID-MCNC: 151 NG/ML DDU — SIGNIFICANT CHANGE UP
DIFF PNL FLD: NEGATIVE — SIGNIFICANT CHANGE UP
EGFR: 34 ML/MIN/1.73M2 — LOW
EGFR: 34 ML/MIN/1.73M2 — LOW
EGFR: 37 ML/MIN/1.73M2 — LOW
EGFR: 37 ML/MIN/1.73M2 — LOW
GLUCOSE BLDC GLUCOMTR-MCNC: 109 MG/DL — HIGH (ref 70–99)
GLUCOSE BLDC GLUCOMTR-MCNC: 125 MG/DL — HIGH (ref 70–99)
GLUCOSE BLDC GLUCOMTR-MCNC: 135 MG/DL — HIGH (ref 70–99)
GLUCOSE BLDC GLUCOMTR-MCNC: 147 MG/DL — HIGH (ref 70–99)
GLUCOSE SERPL-MCNC: 103 MG/DL — HIGH (ref 70–99)
GLUCOSE SERPL-MCNC: 226 MG/DL — HIGH (ref 70–99)
GLUCOSE UR QL: >=1000 MG/DL
HCT VFR BLD CALC: 30.3 % — LOW (ref 39–50)
HCT VFR BLD CALC: 34.7 % — LOW (ref 39–50)
HGB BLD-MCNC: 10.6 G/DL — LOW (ref 13–17)
HGB BLD-MCNC: 9.3 G/DL — LOW (ref 13–17)
KETONES UR QL: NEGATIVE MG/DL — SIGNIFICANT CHANGE UP
LEUKOCYTE ESTERASE UR-ACNC: NEGATIVE — SIGNIFICANT CHANGE UP
MCHC RBC-ENTMCNC: 23.6 PG — LOW (ref 27–34)
MCHC RBC-ENTMCNC: 23.7 PG — LOW (ref 27–34)
MCHC RBC-ENTMCNC: 30.5 G/DL — LOW (ref 32–36)
MCHC RBC-ENTMCNC: 30.7 G/DL — LOW (ref 32–36)
MCV RBC AUTO: 77.3 FL — LOW (ref 80–100)
MCV RBC AUTO: 77.3 FL — LOW (ref 80–100)
NITRITE UR-MCNC: NEGATIVE — SIGNIFICANT CHANGE UP
NRBC BLD AUTO-RTO: 0 /100 WBCS — SIGNIFICANT CHANGE UP (ref 0–0)
NRBC BLD AUTO-RTO: 0 /100 WBCS — SIGNIFICANT CHANGE UP (ref 0–0)
OSMOLALITY UR: 425 MOS/KG — SIGNIFICANT CHANGE UP (ref 300–900)
PH UR: 7.5 — SIGNIFICANT CHANGE UP (ref 5–8)
PLATELET # BLD AUTO: 660 K/UL — HIGH (ref 150–400)
PLATELET # BLD AUTO: 767 K/UL — HIGH (ref 150–400)
POTASSIUM SERPL-MCNC: 5.1 MMOL/L — SIGNIFICANT CHANGE UP (ref 3.5–5.3)
POTASSIUM SERPL-MCNC: 5.5 MMOL/L — HIGH (ref 3.5–5.3)
POTASSIUM SERPL-SCNC: 5.1 MMOL/L — SIGNIFICANT CHANGE UP (ref 3.5–5.3)
POTASSIUM SERPL-SCNC: 5.5 MMOL/L — HIGH (ref 3.5–5.3)
POTASSIUM UR-SCNC: 23 MMOL/L — SIGNIFICANT CHANGE UP
PROT ?TM UR-MCNC: <7 MG/DL — SIGNIFICANT CHANGE UP (ref 0–12)
PROT SERPL-MCNC: 5.8 G/DL — LOW (ref 6–8.3)
PROT UR-MCNC: NEGATIVE MG/DL — SIGNIFICANT CHANGE UP
PROT/CREAT UR-RTO: <0.2 RATIO — SIGNIFICANT CHANGE UP (ref 0–0.2)
RBC # BLD: 3.92 M/UL — LOW (ref 4.2–5.8)
RBC # BLD: 4.49 M/UL — SIGNIFICANT CHANGE UP (ref 4.2–5.8)
RBC # FLD: 15.9 % — HIGH (ref 10.3–14.5)
RBC # FLD: 16.2 % — HIGH (ref 10.3–14.5)
RBC CASTS # UR COMP ASSIST: 0 /HPF — SIGNIFICANT CHANGE UP (ref 0–4)
SODIUM SERPL-SCNC: 135 MMOL/L — SIGNIFICANT CHANGE UP (ref 135–145)
SODIUM SERPL-SCNC: 138 MMOL/L — SIGNIFICANT CHANGE UP (ref 135–145)
SODIUM UR-SCNC: 129 MMOL/L — SIGNIFICANT CHANGE UP
SP GR SPEC: 1.01 — SIGNIFICANT CHANGE UP (ref 1–1.03)
SQUAMOUS # UR AUTO: 1 /HPF — SIGNIFICANT CHANGE UP (ref 0–5)
UROBILINOGEN FLD QL: 0.2 MG/DL — SIGNIFICANT CHANGE UP (ref 0.2–1)
UUN UR-MCNC: 230 MG/DL — SIGNIFICANT CHANGE UP
WBC # BLD: 10.34 K/UL — SIGNIFICANT CHANGE UP (ref 3.8–10.5)
WBC # BLD: 8.18 K/UL — SIGNIFICANT CHANGE UP (ref 3.8–10.5)
WBC # FLD AUTO: 10.34 K/UL — SIGNIFICANT CHANGE UP (ref 3.8–10.5)
WBC # FLD AUTO: 8.18 K/UL — SIGNIFICANT CHANGE UP (ref 3.8–10.5)
WBC UR QL: 1 /HPF — SIGNIFICANT CHANGE UP (ref 0–5)

## 2025-06-14 PROCEDURE — 99223 1ST HOSP IP/OBS HIGH 75: CPT | Mod: 25

## 2025-06-14 PROCEDURE — 99232 SBSQ HOSP IP/OBS MODERATE 35: CPT

## 2025-06-14 PROCEDURE — 99497 ADVNCD CARE PLAN 30 MIN: CPT | Mod: 25

## 2025-06-14 RX ORDER — SODIUM ZIRCONIUM CYCLOSILICATE 5 G/5G
10 POWDER, FOR SUSPENSION ORAL ONCE
Refills: 0 | Status: COMPLETED | OUTPATIENT
Start: 2025-06-14 | End: 2025-06-14

## 2025-06-14 RX ORDER — SODIUM CHLORIDE 9 G/1000ML
1000 INJECTION, SOLUTION INTRAVENOUS
Refills: 0 | Status: DISCONTINUED | OUTPATIENT
Start: 2025-06-14 | End: 2025-06-15

## 2025-06-14 RX ADMIN — TAMSULOSIN HYDROCHLORIDE 0.4 MILLIGRAM(S): 0.4 CAPSULE ORAL at 21:58

## 2025-06-14 RX ADMIN — ENOXAPARIN SODIUM 40 MILLIGRAM(S): 100 INJECTION SUBCUTANEOUS at 23:08

## 2025-06-14 RX ADMIN — SODIUM CHLORIDE 75 MILLILITER(S): 9 INJECTION, SOLUTION INTRAVENOUS at 17:53

## 2025-06-14 RX ADMIN — CLOPIDOGREL BISULFATE 75 MILLIGRAM(S): 75 TABLET, FILM COATED ORAL at 12:57

## 2025-06-14 RX ADMIN — SODIUM ZIRCONIUM CYCLOSILICATE 10 GRAM(S): 5 POWDER, FOR SUSPENSION ORAL at 17:53

## 2025-06-14 RX ADMIN — ATORVASTATIN CALCIUM 20 MILLIGRAM(S): 80 TABLET, FILM COATED ORAL at 21:58

## 2025-06-14 RX ADMIN — Medication 81 MILLIGRAM(S): at 12:56

## 2025-06-14 RX ADMIN — EZETIMIBE 10 MILLIGRAM(S): 10 TABLET ORAL at 12:56

## 2025-06-14 NOTE — CONSULT NOTE ADULT - SUBJECTIVE AND OBJECTIVE BOX
[facility] Attending Consultation Note     Patient seen and evaluated at bedside    Chief Complaint:    HPI:  This is a 75 y/o male w/ PMHx CAD s/p PCI, HTN, T2DM, CKD3, and recent admission for legionella pneumonia wq/ associated LEIA on CKD presenting for hyperkalemia on outpatient labs. He was admitted here from 6/2-5 after presenting for cough and weakness, found to meet severe sepsis criteria w/ LLL PNA, found to be positive for legionella. Was discharged on 6/5, had to finish a PO course of azithromycin outpatient that ended on 6/8. He's been mostly well since discharge, but felt that his heart has been beating fast when he walks. Went to f/u w/ his PCP, and was found to be tachycardic to 120, as well as hyperkalemic to 6.7 w/ Cr of 2.38, so was sent to the ED for further evaluation.    In the ED, he was afebrile and hemodyamically stable, slightly tachycardic to 101, saturating well on RA. CBC w/ microcytic anemia at Hb 11 (better than discharge) and thrombocytosis of 822. CMP w/ hyperkalemia of 5.6 and LEIA on CKD w/ Cr 2.14, 5.7 and 2.06 on repeat. CT Chest w/o IV contrast showing decreased LLL consolidation and elevated L hemidiaphragm. Received 2L IVNS, and insulin shift w/ lokelma 10g in the ED, after which K normalized to 4.6 and Cr came down to 1.95.  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 650 milliGRAM(s) Oral every 6 hours PRN  aspirin enteric coated 81 milliGRAM(s) Oral daily  atorvastatin 20 milliGRAM(s) Oral at bedtime  clopidogrel Tablet 75 milliGRAM(s) Oral daily  dextrose 5%. 1000 milliLiter(s) IV Continuous <Continuous>  dextrose 5%. 1000 milliLiter(s) IV Continuous <Continuous>  dextrose 50% Injectable 25 Gram(s) IV Push once  dextrose 50% Injectable 12.5 Gram(s) IV Push once  dextrose 50% Injectable 25 Gram(s) IV Push once  dextrose Oral Gel 15 Gram(s) Oral once PRN  enoxaparin Injectable 40 milliGRAM(s) SubCutaneous every 24 hours  ezetimibe 10 milliGRAM(s) Oral daily  glucagon  Injectable 1 milliGRAM(s) IntraMuscular once  insulin lispro (ADMELOG) corrective regimen sliding scale   SubCutaneous three times a day before meals  insulin lispro (ADMELOG) corrective regimen sliding scale   SubCutaneous at bedtime  lactated ringers. 1000 milliLiter(s) IV Continuous <Continuous>  melatonin 3 milliGRAM(s) Oral at bedtime PRN  tamsulosin 0.4 milliGRAM(s) Oral at bedtime      FAMILY HISTORY:      Social History: Personally reviewed   No tobacco, EtOH or IVDU     REVIEW OF SYSTEMS:  Constitutional:     [x ] negative [ ] fevers [ ] chills [ ] weight loss [ ] weight gain  HEENT:                  [x ] negative [ ] dry eyes [ ] eye irritation [ ] postnasal drip [ ] nasal congestion  CV:                         [ x] negative  [ ] chest pain [ ] orthopnea [ ] palpitations [ ] murmur  Resp:                     [x ] negative [ ] cough [ ] shortness of breath [ ] dyspnea [ ] wheezing [ ] sputum [ ]hemoptysis  GI:                          [ x] negative [ ] nausea [ ] vomiting [ ] diarrhea [ ] constipation [ ] abd pain [ ] dysphagia   :                        [ x] negative [ ] dysuria [ ] nocturia [ ] hematuria [ ] increased urinary frequency  Musculoskeletal: [x ] negative [ ] back pain [ ] myalgias [ ] arthralgias [ ] fracture  Skin:                       [ x] negative [ ] rash [ ] itch  Neurological:        [ x] negative [ ] headache [ ] dizziness [ ] syncope [ ] weakness [ ] numbness  Psychiatric:           [ x] negative [ ] anxiety [ ] depression  Endocrine:            [ x] negative [ ] diabetes [ ] thyroid problem  Heme/Lymph:      [ x] negative [ ] anemia [ ] bleeding problem  Allergic/Immune: [ x] negative [ ] itchy eyes [ ] nasal discharge [ ] hives [ ] angioedema    [ x] All other systems negative  [ ] Unable to assess ROS due to      Physical Exam:  T(F): 97.9 (06-14), Max: 98.1 (06-13)  HR: 109 (06-14) (87 - 109)  BP: 124/76 (06-14) (124/76 - 150/74)  RR: 18 (06-14)  SpO2: 98% (06-14)    Gen: Well appearing   HENNT: No JVP   CV: regular rate, regular rhtyhm, no murmur   Pulm: clear to auscultation bilaterally   Abdomen: Non-tender, non-distended   Ext: no edema b/l   Neuro: grossly non-focal     Cardiovascular Diagnostic Testing:    CONCLUSIONS:      1. Definity ultrasound enhancing agent was given for enhanced left ventricular opacification and improved delineation of the left ventricular endocardial borders. Endocardial resolution remains suboptimal despite the use of Definity.   2. Left ventricular cavity is normal in size. Left ventricular wall thickness is normal. Left ventricular systolic function is normal with an ejection fraction visually estimated at 60 to 65 %.There are probably no regional wall motion abnormalities.    Labs: Personally reviewed                        10.6   10.34 )-----------( 767      ( 14 Jun 2025 14:25 )             34.7     06-14    135  |  102  |  22  ----------------------------<  226[H]  5.5[H]   |  20[L]  |  2.02[H]    Ca    9.3      14 Jun 2025 14:25  Mg     1.8     06-13    TPro  5.8[L]  /  Alb  3.3  /  TBili  0.7  /  DBili  x   /  AST  16  /  ALT  20  /  AlkPhos  65  06-14            Thyroid Stimulating Hormone, Serum: 2.43 uIU/mL (06-13 @ 11:23)

## 2025-06-14 NOTE — PROGRESS NOTE ADULT - PROBLEM SELECTOR PLAN 1
- Cr 2.14 on arrival, now down to 1.95 after 2L IVNS  - Baseline on OP labs in April was 1.47  - Given improvement w/ IVF may have pre-renal component but patient without any obvious sources of what may cause a pre-renal LEIA since discharge as he isn't fluid down, anemia has improved  - KUB US: no hydronephrosis, enlarged prostate   - Monitor urine output  - Hold home losartan  - Avoid other nephrotoxic medications and dose meds according to GFR  - encouraged PO intake

## 2025-06-14 NOTE — PROGRESS NOTE ADULT - PROBLEM/PLAN-8
DISPLAY PLAN FREE TEXT I will STOP taking the medications listed below when I get home from the hospital:  None

## 2025-06-14 NOTE — CONSULT NOTE ADULT - ASSESSMENT
74M w/ PMH of HTN, HLD, CAD s/p PCI, T2DM and CKD3 recent admission for legionella PNA here for hyperkalemia on op lab and worsening LEIA     -cont asa/plavix for DAPT. EKG w/ sinus tachycardia, no STT changes   -cont atorva 20 mg qhs and zetia 10 mg qd   -initial K 6.7 improved w/ IVF and holding losartan  -monitor K and urine studies per nephro  -add coreg 6.25 mg bid for bp and tachycardia now off losartan   -monitor on tele for EKG changes     Severo Celis MD [facility]  Attending Interventional Cardiologist, [facility]-NS/MYRNA.   Avaliable on [employer] Team

## 2025-06-14 NOTE — PROGRESS NOTE ADULT - PROBLEM SELECTOR PLAN 2
- K of 5.7 on arrival, hyperkalemic to 6.7 on OP labs  - S/p insulin shift w/ lokelma, now down to 4.6  - Continue to monitor potassium levels  - continue to hold losartan

## 2025-06-14 NOTE — PROGRESS NOTE ADULT - PROBLEM SELECTOR PLAN 4
- Hb 11, MCV 77  - Per OP labs, patient seems to be having worsening microcytosis over the past few months  - Had iron studies done on 6/12 - iron 71, ferritin 78, TIBC 403   - Patient states that he hasn't had much of an appetite recently  - Last colonoscopy >5 years ago per patient  - Will need to f/u w/ GI for outpatient colonoscopy

## 2025-06-15 VITALS
SYSTOLIC BLOOD PRESSURE: 148 MMHG | OXYGEN SATURATION: 97 % | TEMPERATURE: 98 F | RESPIRATION RATE: 18 BRPM | DIASTOLIC BLOOD PRESSURE: 76 MMHG | HEART RATE: 103 BPM

## 2025-06-15 LAB
ANION GAP SERPL CALC-SCNC: 13 MMOL/L — SIGNIFICANT CHANGE UP (ref 5–17)
BUN SERPL-MCNC: 18 MG/DL — SIGNIFICANT CHANGE UP (ref 7–23)
CALCIUM SERPL-MCNC: 9 MG/DL — SIGNIFICANT CHANGE UP (ref 8.4–10.5)
CHLORIDE SERPL-SCNC: 105 MMOL/L — SIGNIFICANT CHANGE UP (ref 96–108)
CO2 SERPL-SCNC: 21 MMOL/L — LOW (ref 22–31)
CREAT SERPL-MCNC: 1.89 MG/DL — HIGH (ref 0.5–1.3)
EGFR: 36 ML/MIN/1.73M2 — LOW
EGFR: 36 ML/MIN/1.73M2 — LOW
GLUCOSE BLDC GLUCOMTR-MCNC: 149 MG/DL — HIGH (ref 70–99)
GLUCOSE BLDC GLUCOMTR-MCNC: 174 MG/DL — HIGH (ref 70–99)
GLUCOSE SERPL-MCNC: 127 MG/DL — HIGH (ref 70–99)
MAGNESIUM SERPL-MCNC: 1.6 MG/DL — SIGNIFICANT CHANGE UP (ref 1.6–2.6)
POTASSIUM SERPL-MCNC: 4.4 MMOL/L — SIGNIFICANT CHANGE UP (ref 3.5–5.3)
POTASSIUM SERPL-SCNC: 4.4 MMOL/L — SIGNIFICANT CHANGE UP (ref 3.5–5.3)
SODIUM SERPL-SCNC: 139 MMOL/L — SIGNIFICANT CHANGE UP (ref 135–145)

## 2025-06-15 PROCEDURE — 99285 EMERGENCY DEPT VISIT HI MDM: CPT | Mod: 25

## 2025-06-15 PROCEDURE — 81001 URINALYSIS AUTO W/SCOPE: CPT

## 2025-06-15 PROCEDURE — 84156 ASSAY OF PROTEIN URINE: CPT

## 2025-06-15 PROCEDURE — 83735 ASSAY OF MAGNESIUM: CPT

## 2025-06-15 PROCEDURE — 99239 HOSP IP/OBS DSCHRG MGMT >30: CPT

## 2025-06-15 PROCEDURE — 85025 COMPLETE CBC W/AUTO DIFF WBC: CPT

## 2025-06-15 PROCEDURE — 84300 ASSAY OF URINE SODIUM: CPT

## 2025-06-15 PROCEDURE — 85379 FIBRIN DEGRADATION QUANT: CPT

## 2025-06-15 PROCEDURE — 82570 ASSAY OF URINE CREATININE: CPT

## 2025-06-15 PROCEDURE — 80048 BASIC METABOLIC PNL TOTAL CA: CPT

## 2025-06-15 PROCEDURE — 84540 ASSAY OF URINE/UREA-N: CPT

## 2025-06-15 PROCEDURE — 84133 ASSAY OF URINE POTASSIUM: CPT

## 2025-06-15 PROCEDURE — 99232 SBSQ HOSP IP/OBS MODERATE 35: CPT

## 2025-06-15 PROCEDURE — 85027 COMPLETE CBC AUTOMATED: CPT

## 2025-06-15 PROCEDURE — 84295 ASSAY OF SERUM SODIUM: CPT

## 2025-06-15 PROCEDURE — 76770 US EXAM ABDO BACK WALL COMP: CPT

## 2025-06-15 PROCEDURE — 82947 ASSAY GLUCOSE BLOOD QUANT: CPT

## 2025-06-15 PROCEDURE — 85014 HEMATOCRIT: CPT

## 2025-06-15 PROCEDURE — 96374 THER/PROPH/DIAG INJ IV PUSH: CPT

## 2025-06-15 PROCEDURE — 84132 ASSAY OF SERUM POTASSIUM: CPT

## 2025-06-15 PROCEDURE — 82803 BLOOD GASES ANY COMBINATION: CPT

## 2025-06-15 PROCEDURE — 83605 ASSAY OF LACTIC ACID: CPT

## 2025-06-15 PROCEDURE — 82330 ASSAY OF CALCIUM: CPT

## 2025-06-15 PROCEDURE — 83935 ASSAY OF URINE OSMOLALITY: CPT

## 2025-06-15 PROCEDURE — 36415 COLL VENOUS BLD VENIPUNCTURE: CPT

## 2025-06-15 PROCEDURE — 84443 ASSAY THYROID STIM HORMONE: CPT

## 2025-06-15 PROCEDURE — 85018 HEMOGLOBIN: CPT

## 2025-06-15 PROCEDURE — 71250 CT THORAX DX C-: CPT

## 2025-06-15 PROCEDURE — 80053 COMPREHEN METABOLIC PANEL: CPT

## 2025-06-15 PROCEDURE — 93005 ELECTROCARDIOGRAM TRACING: CPT

## 2025-06-15 PROCEDURE — 82962 GLUCOSE BLOOD TEST: CPT

## 2025-06-15 PROCEDURE — 82435 ASSAY OF BLOOD CHLORIDE: CPT

## 2025-06-15 RX ORDER — CARVEDILOL 3.12 MG/1
1 TABLET, FILM COATED ORAL
Qty: 60 | Refills: 2
Start: 2025-06-15 | End: 2025-09-12

## 2025-06-15 RX ADMIN — CLOPIDOGREL BISULFATE 75 MILLIGRAM(S): 75 TABLET, FILM COATED ORAL at 11:06

## 2025-06-15 RX ADMIN — Medication 81 MILLIGRAM(S): at 11:06

## 2025-06-15 RX ADMIN — INSULIN LISPRO 2: 100 INJECTION, SOLUTION INTRAVENOUS; SUBCUTANEOUS at 12:55

## 2025-06-15 RX ADMIN — EZETIMIBE 10 MILLIGRAM(S): 10 TABLET ORAL at 11:45

## 2025-06-15 NOTE — DIETITIAN INITIAL EVALUATION ADULT - PHYSCIAL ASSESSMENT
Nutrition focused physical exam performed with patient's consent. Patient reports 6lbs (4.1%) weight loss from 145lbs to 138lbs x 3 wks in setting of poor intake, clinically significant.     Per Moris NATARAJAN weight history: (4/2025)142lbs; (12/2025)140lbs; (10/2023)138lbs;    IBW: 130lbs  IBW%: 106%

## 2025-06-15 NOTE — DIETITIAN INITIAL EVALUATION ADULT - PROBLEM SELECTOR PLAN 1
- Cr 2.14 on arrival, now down to 1.95 after 2L IVNS  - Baseline on OP labs in April was 1.47  - Given improvement w/ IVF may have pre-renal component but patient without any obvious sources of what may cause a pre-renal LEIA since discharge as he isn't fluid down, anemia has improved  - Check US kidney bladder and urine electrolytes  - Monitor urine output  - Hold home losartan  - Avoid other nephrotoxic medications and dose meds according to GFR  - LR 100cc/hr for now

## 2025-06-15 NOTE — DISCHARGE NOTE PROVIDER - PROVIDER TOKENS
PROVIDER:[TOKEN:[[id number]],FOLLOWUP:[1 week],ESTABLISHEDPATIENT:[T]],PROVIDER:[TOKEN:[[id number]],SCHEDULEDAPPT:[06/20/2025],ESTABLISHEDPATIENT:[T]]

## 2025-06-15 NOTE — DISCHARGE NOTE PROVIDER - NSDCCPCAREPLAN_GEN_ALL_CORE_FT
PRINCIPAL DISCHARGE DIAGNOSIS  Diagnosis: LEIA (acute kidney injury)  Assessment and Plan of Treatment: IV Fluids given.   Losartan discontinued.   Follow up with PMD in 1 week to check BMP.    Avoid use of Motrin, advil, naproxen, ibuprophen.      SECONDARY DISCHARGE DIAGNOSES  Diagnosis: Hyperkalemia  Assessment and Plan of Treatment: Resolved.

## 2025-06-15 NOTE — DISCHARGE NOTE NURSING/CASE MANAGEMENT/SOCIAL WORK - FINANCIAL ASSISTANCE
[facility] provides services at a reduced cost to those who are determined to be eligible through [facility]’s financial assistance program. Information regarding [facility]’s financial assistance program can be found by going to [email or web address] or by calling [phone].

## 2025-06-15 NOTE — DIETITIAN INITIAL EVALUATION ADULT - OTHER INFO
- Endo: hx of type 2 diabetes, A1C: 7.8%, Finger stick x 24hrs: 109-149mg/dL, checks glucose once in a while, on metformin for glucose management at home, ordered for insulin for glycemic control   - Renal: stage 3 chronic kidney disease per chart, acute kidney injury on chronic kidney disease, hyperkalemia noted previously (WNL)  - Pt on IVF 75ml/hr

## 2025-06-15 NOTE — PROGRESS NOTE ADULT - SUBJECTIVE AND OBJECTIVE BOX
"[facility] Note     Patient Name: Kyle Godwin II  Encounter Date: 8/26/2024     Have you been cared for by a [facility]atologist in the last 3 years and, if so, which description applies to you?    NO.   I am considered a \"new\" patient and must complete all patient intake questions. I am MALE/not capable of bearing children.    REVIEW OF SYSTEMS:  Have you recently had or currently have any of the following? Recent fever or chills? No  Any non-healing wound? No   PAST MEDICAL HISTORY:  Have you personally ever had or currently have any of the following?  If \"YES,\" then please provide more detail. Skin cancer (such as Melanoma, Basal Cell Carcinoma, Squamous Cell Carcinoma?  No  Tuberculosis, HIV/AIDS, Hepatitis B or C: No  Radiation Treatment No   HISTORY OF IMMUNOSUPPRESSION:   Do you have a history of any of the following:  Systemic Immunosuppression such as Diabetes, Biologic or Immunotherapy, Chemotherapy, Organ Transplantation, Bone Marrow Transplantation?  No     Answering \"YES\" requires the addition of the dotphrase \"IMMUNOSUPPRESSED\" as the first diagnosis of the patient's visit.   FAMILY HISTORY:  Any \"first degree relatives\" (parent, brother, sister, or child) with the following?    Skin Cancer, Pancreatic or Other Cancer? No   PATIENT EXPERIENCE:    Do you want the Dermatologist to perform a COMPLETE skin exam today including a clinical examination under the \"bra and underwear\" areas?  NO  If necessary, do we have your permission to call and leave a detailed message on your Preferred Phone number that includes your specific medical information?  Yes      Allergies   Allergen Reactions   • Cat Hair Extract    • Dog Epithelium    • Dust Mite Extract    • Pollen Extract       Current Outpatient Medications:   •  Albuterol Sulfate (ProAir RespiClick) 108 (90 Base) MCG/ACT AEPB, Inhale 2 puffs every 4 (four) hours as needed (with cough wheeze or chest tightness and 20 minutes "
[facility] Attending Follow-up Note     Patient seen and examined at bedside.    Overnight Events:     evens noted     REVIEW OF SYSTEMS:  Constitutional:     [x ] negative [ ] fevers [ ] chills [ ] weight loss [ ] weight gain  HEENT:                  [x ] negative [ ] dry eyes [ ] eye irritation [ ] postnasal drip [ ] nasal congestion  CV:                         [ x] negative  [ ] chest pain [ ] orthopnea [ ] palpitations [ ] murmur  Resp:                     [ x] negative [ ] cough [ ] shortness of breath [ ] dyspnea [ ] wheezing [ ] sputum [ ]hemoptysis  GI:                          [ x] negative [ ] nausea [ ] vomiting [ ] diarrhea [ ] constipation [ ] abd pain [ ] dysphagia   :                        [ x] negative [ ] dysuria [ ] nocturia [ ] hematuria [ ] increased urinary frequency  Musculoskeletal: [ x] negative [ ] back pain [ ] myalgias [ ] arthralgias [ ] fracture  Skin:                       [ x] negative [ ] rash [ ] itch  Neurological:        [x ] negative [ ] headache [ ] dizziness [ ] syncope [ ] weakness [ ] numbness  Psychiatric:           [ x] negative [ ] anxiety [ ] depression  Endocrine:            [ x] negative [ ] diabetes [ ] thyroid problem  Heme/Lymph:      [ x] negative [ ] anemia [ ] bleeding problem  Allergic/Immune: [ x] negative [ ] itchy eyes [ ] nasal discharge [ ] hives [ ] angioedema    [ x] All other systems negative  [ ] Unable to assess ROS due to    Current Meds:  acetaminophen     Tablet .. 650 milliGRAM(s) Oral every 6 hours PRN  aspirin enteric coated 81 milliGRAM(s) Oral daily  atorvastatin 20 milliGRAM(s) Oral at bedtime  clopidogrel Tablet 75 milliGRAM(s) Oral daily  dextrose 5%. 1000 milliLiter(s) IV Continuous <Continuous>  dextrose 5%. 1000 milliLiter(s) IV Continuous <Continuous>  dextrose 50% Injectable 25 Gram(s) IV Push once  dextrose 50% Injectable 12.5 Gram(s) IV Push once  dextrose 50% Injectable 25 Gram(s) IV Push once  dextrose Oral Gel 15 Gram(s) Oral once PRN  enoxaparin Injectable 40 milliGRAM(s) SubCutaneous every 24 hours  ezetimibe 10 milliGRAM(s) Oral daily  glucagon  Injectable 1 milliGRAM(s) IntraMuscular once  insulin lispro (ADMELOG) corrective regimen sliding scale   SubCutaneous three times a day before meals  insulin lispro (ADMELOG) corrective regimen sliding scale   SubCutaneous at bedtime  lactated ringers. 1000 milliLiter(s) IV Continuous <Continuous>  melatonin 3 milliGRAM(s) Oral at bedtime PRN  tamsulosin 0.4 milliGRAM(s) Oral at bedtime      PAST MEDICAL & SURGICAL HISTORY:  HTN (hypertension)      Dyslipidemia      Diabetes mellitus      Cervical cord compression with myelopathy      CAD (coronary artery disease)      Ossification of posterior longitudinal ligament in cervical region      History of coronary artery stent placement          Vitals:  T(F): 97.8 (06-15), Max: 98.3 (06-15)  HR: 103 (06-15) (80 - 103)  BP: 148/76 (06-15) (135/75 - 161/81)  RR: 18 (06-15)  SpO2: 97% (06-15)  I&O's Summary    14 Jun 2025 07:01  -  15 Antolin 2025 07:00  --------------------------------------------------------  IN: 2025 mL / OUT: 1751 mL / NET: 274 mL    15 Antolin 2025 07:01  -  15 Antolin 2025 22:00  --------------------------------------------------------  IN: 0 mL / OUT: 500 mL / NET: -500 mL        Physical Exam:  Appearance: No acute distress  HENT: No JVD   Cardiovascular: RRR, S1/S2, no murmurs  Respiratory: CTABL  Gastrointestinal: soft, NT ND, +BS  Musculoskeletal: No clubbing, no edema   Neurologic: Non-focal  Skin: No rashes, ecchymoses, or cyanosis                          10.6   10.34 )-----------( 767      ( 14 Jun 2025 14:25 )             34.7     06-15    139  |  105  |  18  ----------------------------<  127[H]  4.4   |  21[L]  |  1.89[H]    Ca    9.0      15 Antolin 2025 05:38  Mg     1.6     06-15    TPro  5.8[L]  /  Alb  3.3  /  TBili  0.7  /  DBili  x   /  AST  16  /  ALT  20  /  AlkPhos  65  06-14                  Cardiovascular Testings:     
before exertion), Disp: 1 each, Rfl: 0  •  atoMOXetine (STRATTERA) 40 mg capsule, Take 40 mg by mouth daily, Disp: , Rfl:   •  fluticasone (FLOVENT HFA) 110 MCG/ACT inhaler, Inhale 2 puffs 2 (two) times a day (Patient not taking: Reported on 7/20/2021), Disp: 1 Inhaler, Rfl: 1  •  fluticasone (Flovent HFA) 110 MCG/ACT inhaler, Inhale 2 puffs 2 (two) times a day Rinse mouth after use. (Patient not taking: Reported on 6/12/2024), Disp: 12 g, Rfl: 3  •  lisdexamfetamine (VYVANSE) 30 MG capsule, Take 1 capsule (30 mg total) by mouth every morning for 15 daysMax Daily Amount: 30 mg (Patient not taking: Reported on 7/10/2023), Disp: 15 capsule, Rfl: 0  •  propranolol (INDERAL) 20 mg tablet, Take 20 mg by mouth daily as needed (Patient not taking: Reported on 8/16/2023), Disp: , Rfl:           Whom besides the patient is providing clinical information about today's encounter?   NO ADDITIONAL HISTORIAN (patient alone provided history)    Physical Exam and Assessment/Plan by Diagnosis:      DERMAL NEVUS vs other soft tissue growth    Physical Exam:  Anatomic Location Affected:  Left side of nose  Morphological Description:  dome shaped 0.5 cm papule with small amount of central pigment  Pertinent Positives:  Pertinent Negatives:    Additional History of Present Condition:  Patient is present for bump on the left side of his noes, he notes that its been present for 3 years it doesn't bleed or itch, and it has not been growing in size.    Assessment and Plan:  Based on a thorough discussion of this condition and the management approach to it (including a comprehensive discussion of the known risks, side effects and potential benefits of treatment), the patient (family) agrees to implement the following specific plan:    Discussed removal in office today versus observation, consent for procedure was sign by patient. See procedure notes below. I discussed issue of scaring and potential for  recurrence or need for further therapy 
"if suboptimal healing.    NEOPLASM OF UNCERTAIN BEHAVIOR OF SKIN    Physical Exam:  (Anatomic Location); (Size and Morphological Description); (Differential Diagnosis):  Specimen A; left nasal ala 0.5 cm irritated new papule; Dermal nevus vs Other soft tissue growth.  Pertinent Positives:  Pertinent Negatives:    Additional History of Present Condition:  Patient is present for bump on the left side of his noes, he notes that its been present for 3 years it doesn't bleed or itch, and it has not been growing in size.      Assessment and Plan:  I have discussed with the patient that a sample of skin via a \"skin biopsy” would be potentially helpful to further make a specific diagnosis under the microscope.  Based on a thorough discussion of this condition and the management approach to it (including a comprehensive discussion of the known risks, side effects and potential benefits of treatment), the patient (family) agrees to implement the following specific plan:    Procedure:  Skin Biopsy.  After a thorough discussion of treatment options and risk/benefits/alternatives (including but not limited to local pain, scarring, dyspigmentation, blistering, possible superinfection, and inability to confirm a diagnosis via histopathology), verbal and written consent were obtained and portion of the rash was biopsied for tissue sample.  See below for consent that was obtained from patient and subsequent Procedure Note.     PROCEDURE TANGENTIAL (SHAVE) BIOPSY NOTE:    Performing Physician:   Anatomic Location; Clinical Description with size (cm); Pre-Op Diagnosis:   Specimen A; left nasal ala 0.5 cm irritated new papule; Dermal nevus vs Other soft tissue growth   Post-op diagnosis: Same     Local anesthesia: 3:1 1% xylocaine with epi and 1-100,000 buffered     Topical anesthesia: None    Hemostasis: Aluminum chloride       After obtaining informed consent  at which time there was a discussion about the purpose of biopsy  "
"and low risks of infection and bleeding.  The area was prepped and draped in the usual fashion. Anesthesia was obtained with 1% lidocaine with epinephrine. A shave biopsy to an appropriate sampling depth was obtained by Shave (Dermablade or 15 blade) The resulting wound was covered with surgical ointment and bandaged appropriately.     The patient tolerated the procedure well without complications and was without signs of functional compromise.      Specimen has been sent for review by Dermatopathology.    Standard post-procedure care has been explained and has been included in written form within the patient's copy of Informed Consent.    INFORMED CONSENT DISCUSSION AND POST-OPERATIVE INSTRUCTIONS FOR PATIENT    I.  RATIONALE FOR PROCEDURE  I understand that a skin biopsy allows the Dermatologist to test a lesion or rash under the microscope to obtain a diagnosis.  It usually involves numbing the area with numbing medication and removing a small piece of skin; sometimes the area will be closed with sutures. In this specific procedure, sutures are not usually needed.  If any sutures are placed, then they are usually need to be removed in 2 weeks or less.    I understand that my Dermatologist recommends that a skin \"shave\" biopsy be performed today.  A local anesthetic, similar to the kind that a dentist uses when filling a cavity, will be injected with a very small needle into the skin area to be sampled.  The injected skin and tissue underneath \"will go to sleep” and become numb so no pain should be felt afterwards.  An instrument shaped like a tiny \"razor blade\" (shave biopsy instrument) will be used to cut a small piece of tissue and skin from the area so that a sample of tissue can be taken and examined more closely under the microscope.  A slight amount of bleeding will occur, but it will be stopped with direct pressure and a pressure bandage and any other appropriate methods.  I understands that a scar will "
"form where the wound was created.  Surgical ointment will be applied to help protect the wound.  Sutures are not usually needed.    II.  RISKS AND POTENTIAL COMPLICATIONS   I understand the risks and potential complications of a skin biopsy include but are not limited to the following:  Bleeding  Infection  Pain  Scar/keloid  Skin discoloration  Incomplete Removal  Recurrence  Nerve Damage/Numbness/Loss of Function  Allergic Reaction to Anesthesia  Biopsies are diagnostic procedures and based on findings additional treatment or evaluation may be required  Loss or destruction of specimen resulting in no additional findings    My Dermatologist has explained to me the nature of the condition, the nature of the procedure, and the benefits to be reasonably expected compared with alternative approaches.  My Dermatologist has discussed the likelihood of major risks or complications of this procedure including the specific risks listed above, such as bleeding, infection, and scarring/keloid.  I understand that a scar is expected after this procedure.  I understand that my physician cannot predict if the scar will form a \"keloid,\" which extends beyond the borders of the wound that is created.  A keloid is a thick, painful, and bumpy scar.  A keloid can be difficult to treat, as it does not always respond well to therapy, which includes injecting cortisone directly into the keloid every few weeks.  While this usually reduces the pain and size of the scar, it does not eliminate it.      I understand that photographs may be taken before and after the procedure.  These will be maintained as part of the medical providers confidential records and may not be made available to me.  I further authorize the medical provider to use the photographs for teaching purposes or to illustrate scientific papers, books, or lectures if in his/her judgment, medical research, education, or science may benefit from its use.    I have had an "
"opportunity to fully inquire about the risks and benefits of this procedure and its alternatives.   I have been given ample time and opportunity to ask questions and to seek a second opinion if I wished to do so.  I acknowledge that there have specifically been no guarantees as to the cosmetic results from the procedure.  I am aware that with any procedure there is always the possibility of an unexpected complication.    III. POST-PROCEDURAL CARE (WHAT YOU WILL NEED TO DO \"AFTER THE BIOPSY\" TO OPTIMIZE HEALING)    Keep the area clean and dry.  Try NOT to remove the bandage or get it wet for the first 24 hours.    Gently clean the area and apply surgical ointment (such as Vaseline petrolatum ointment, which is available \"over the counter\" and not a prescription) to the biopsy site for up to 2 weeks straight.  This acts to protect the wound from the outside world.      Sutures are not usually placed in this procedure.  If any sutures were placed, return for suture removal as instructed (generally 1 week for the face, 2 weeks for the body).      Take Acetaminophen (Tylenol) for discomfort, if no contraindications.  Ibuprofen or aspirin could make bleeding worse.    Call our office immediately for signs of infection: fever, chills, increased redness, warmth, tenderness, discomfort/pain, or pus or foul smell coming from the wound.    WHAT TO DO IF THERE IS ANY BLEEDING?  If a small amount of bleeding is noticed, place a clean cloth over the area and apply firm pressure for ten minutes.  Check the wound after 10 minutes of direct pressure.  If bleeding persists, try one more time for an additional 10 minutes of direct pressure on the area.  If the bleeding becomes heavier or does not stop after the second attempt, or if you have any other questions about this procedure, then please call your . [place] by calling [phone] (SKIN).     I hereby acknowledge that I have reviewed and verified the site with my "
Dermatologist and have requested and authorized my Dermatologist to proceed with the procedure.      ANDROGENETIC ALOPECIA    Physical Exam:    Well appearing, in no acute distress. Well nourishes, well developed. Alert and oriented. A focused skin exam was performed including scalp and hair. The exam was negative except as noted below:     Scalp:   Thinning over the vertex scalp and frontal scalp  Negative hair pull test  No perifollicular erythema or scale  Follicular orifices in tact  Hairline:  Eyebrows and Eyelashes In tact  Pertinent Positives:  Pertinent Negatives:      MALE PATIENT Assessment and Plan:    Start over the counter Rogaine Minoxidil 5% men foam, rub on scalp at night leave in over night and rinse off in the morning.  History and physical consistent with androgenetic alopecia  Discussed treatment ladder, including PO and topical minoxidil, PRP,  low level laser therapy, supplements (saw palmetto - discussed same side effects as finasteride), finasteride and dutasteride, hair transplant  - Discussed 5% minoxidil foam qHS. Counseled patient that they may see some increased shedding within the first few weeks of treatment, which is a normal side effect and will resolve spontaneously. They may also see mild facial hypertrichosis. Educated that treatment should be continuous to maintain efficacy  - Discussed PO finasteride 1 mg daily - discussed that persistent sexual dysfunction has been seen in ~1% of men who take finasteride for hair loss. Also counseled patient that finasteride can lead to a falsely low PSA reading, and small potential increased risk of high grade prostate malignancy. Discussed that combination of topical minoxidil and oral finasteride is considered the best combination treatment for male androgenetic alopecia. Decreased sexual function temporary or persistent noted in case report.  - Discussed  topical finasteride/minoxidil. Educated that topical formulations of finasteride are 
also available and have been shown to be as effective as 1 mg PO finasteride daily in the literature (PMID: 24780277), with decreased likelihood of side effects and possible synergistic effects when used in combination with topical minoxidil. Discussed [facility] compounded medication with Finasteride/Minoxidil for $35.  *Discussed PO minoxidil 2.5 mg QOD which has been shown to improve hair growth at low dose. Educated on side effects, including hypertrichosis, hypotension, lower extremity edema.  I noted he  is on propanolol.  Interactions with this med would need to be carefully considered with PMD>  Educated patient that regrowth may take many months.    As he is about to leave for college in [place],  topical minoxidil would be recommended as initial treatment.       - Patient will call to schedule a 6 month follow up to assess progress    
Patient is a 74y old  Male who presents with a chief complaint of Hyperkalemia, tachycardia (13 Jun 2025 21:51)      SUBJECTIVE : NAEO. Pt seen and examined at bedside. Feels much better than on admission - urinating well. Wants to go home for fathers day    Denies CP, SOB, fever/chills, dysuria, nausea/vomiting, diarrhea/constipation.       MEDICATIONS  (STANDING):  aspirin enteric coated 81 milliGRAM(s) Oral daily  atorvastatin 20 milliGRAM(s) Oral at bedtime  clopidogrel Tablet 75 milliGRAM(s) Oral daily  dextrose 5%. 1000 milliLiter(s) (50 mL/Hr) IV Continuous <Continuous>  dextrose 5%. 1000 milliLiter(s) (100 mL/Hr) IV Continuous <Continuous>  dextrose 50% Injectable 25 Gram(s) IV Push once  dextrose 50% Injectable 12.5 Gram(s) IV Push once  dextrose 50% Injectable 25 Gram(s) IV Push once  enoxaparin Injectable 40 milliGRAM(s) SubCutaneous every 24 hours  ezetimibe 10 milliGRAM(s) Oral daily  glucagon  Injectable 1 milliGRAM(s) IntraMuscular once  insulin lispro (ADMELOG) corrective regimen sliding scale   SubCutaneous three times a day before meals  insulin lispro (ADMELOG) corrective regimen sliding scale   SubCutaneous at bedtime  lactated ringers. 1000 milliLiter(s) (100 mL/Hr) IV Continuous <Continuous>  tamsulosin 0.4 milliGRAM(s) Oral at bedtime    MEDICATIONS  (PRN):  acetaminophen     Tablet .. 650 milliGRAM(s) Oral every 6 hours PRN Temp greater or equal to 38C (100.4F), Mild Pain (1 - 3)  dextrose Oral Gel 15 Gram(s) Oral once PRN Blood Glucose LESS THAN 70 milliGRAM(s)/deciliter  melatonin 3 milliGRAM(s) Oral at bedtime PRN Insomnia      CAPILLARY BLOOD GLUCOSE      POCT Blood Glucose.: 135 mg/dL (14 Jun 2025 12:19)  POCT Blood Glucose.: 125 mg/dL (14 Jun 2025 08:43)  POCT Blood Glucose.: 237 mg/dL (13 Jun 2025 21:29)  POCT Blood Glucose.: 98 mg/dL (13 Jun 2025 14:53)    I&O's Summary    13 Jun 2025 07:01  -  14 Jun 2025 07:00  --------------------------------------------------------  IN: 700 mL / OUT: 850 mL / NET: -150 mL        PHYSICAL EXAM:  Vital Signs Last 24 Hrs  T(C): 36.5 (14 Jun 2025 05:02), Max: 36.7 (13 Jun 2025 17:12)  T(F): 97.7 (14 Jun 2025 05:02), Max: 98.1 (13 Jun 2025 23:05)  HR: 88 (14 Jun 2025 05:02) (62 - 90)  BP: 135/75 (14 Jun 2025 05:02) (135/75 - 150/74)  BP(mean): --  RR: 17 (14 Jun 2025 05:02) (16 - 18)  SpO2: 98% (14 Jun 2025 05:02) (96% - 100%)    Parameters below as of 14 Jun 2025 05:02  Patient On (Oxygen Delivery Method): room air        CONSTITUTIONAL: NAD, well-groomed  EYES:  conjunctiva and sclera clear  ENMT: Moist oral mucosa  NECK: Supple, no palpable masses; no JVD  RESPIRATORY: Normal respiratory effort; lungs are clear to auscultation bilaterally  CARDIOVASCULAR: Regular rate and rhythm, normal S1 and S2, no murmur/rub/gallop; No lower extremity edema  ABDOMEN: Nontender to palpation, normoactive bowel sounds, no rebound/guarding  MUSCULOSKELETAL:  no clubbing or cyanosis of digits; no joint swelling or tenderness to palpation  PSYCH: A+O to person, place, and time; affect appropriate  SKIN: No rashes; no palpable lesions    LABS:                        9.3    8.18  )-----------( 660      ( 14 Jun 2025 06:58 )             30.3     06-14    138  |  107  |  22  ----------------------------<  103[H]  5.1   |  19[L]  |  1.85[H]    Ca    8.8      14 Jun 2025 06:57  Mg     1.8     06-13    TPro  5.8[L]  /  Alb  3.3  /  TBili  0.7  /  DBili  x   /  AST  16  /  ALT  20  /  AlkPhos  65  06-14          Urinalysis Basic - ( 14 Jun 2025 06:57 )    Color: x / Appearance: x / SG: x / pH: x  Gluc: 103 mg/dL / Ketone: x  / Bili: x / Urobili: x   Blood: x / Protein: x / Nitrite: x   Leuk Esterase: x / RBC: x / WBC x   Sq Epi: x / Non Sq Epi: x / Bacteria: x          RADIOLOGY & ADDITIONAL TESTS:  Results Reviewed:   Imaging Personally Reviewed:  Electrocardiogram Personally Reviewed:    COORDINATION OF CARE:  Care Discussed with Consultants/Other Providers [Y/N]:  Prior or Outpatient Records Reviewed [Y/N]:

## 2025-06-15 NOTE — DISCHARGE NOTE PROVIDER - CARE PROVIDERS DIRECT ADDRESSES
[email or web address]

## 2025-06-15 NOTE — DIETITIAN INITIAL EVALUATION ADULT - ETIOLOGY
related to decreased ability to consume adequate protein-energy intake in setting of lack appetite/fever

## 2025-06-15 NOTE — DIETITIAN INITIAL EVALUATION ADULT - PROBLEM SELECTOR PLAN 2
- K of 5.7 on arrival, hyperkalemic to 6.7 on OP labs  - S/p insulin shift w/ lokelma, now down to 4.6  - Continue to monitor potassium levels  - No peaked T-waves on ECG

## 2025-06-15 NOTE — DISCHARGE NOTE PROVIDER - CARE PROVIDER_API CALL
Jose Gibbons  Cardiology  [address] [facility], [address] [place] [id number]  Phone: [phone]  Fax: [phone]  Established Patient  Follow Up Time: 1 week    Mis Massey  Internal Medicine  [address] [facility], [address] [address]  Phone: [phone]  Fax: [phone]  Established Patient  Scheduled Appointment: 06/20/2025

## 2025-06-15 NOTE — DISCHARGE NOTE NURSING/CASE MANAGEMENT/SOCIAL WORK - PATIENT PORTAL LINK FT
You can access the FollowMyHealth Patient Portal offered by [facility] by registering at the following website: [email or web address]. By joining [facility]’s FollowMyHealth portal, you will also be able to view your health information using other applications (apps) compatible with our system.

## 2025-06-15 NOTE — DIETITIAN INITIAL EVALUATION ADULT - ENERGY INTAKE
Fair (50-75%) Pt states overall improved appetite/PO intake in-house, reports consuming ~75% of his meal. Pt likely meeting <75% of estimated nutritional needs. Amenable to trial Glucerna 1x daily to optimize PO intake. Pt made aware RD remains available and will follow up for any additional questions/concerns and per protocol.

## 2025-06-15 NOTE — DISCHARGE NOTE PROVIDER - DETAILS OF MALNUTRITION DIAGNOSIS/DIAGNOSES
This patient has been assessed with a concern for Malnutrition and was treated during this hospitalization for the following Nutrition diagnosis/diagnoses:     -  06/15/2025: Moderate protein-calorie malnutrition

## 2025-06-15 NOTE — DIETITIAN INITIAL EVALUATION ADULT - PROBLEM SELECTOR PLAN 3
- HR here max 101, ECG personally reviewed - normal sinus rhythm, no ST elevations, depression or TWIs  - Patient states he was tachycardic to 120 at the office, and has also felt palpitations while walking  - Platelet count has increased to 822 since discharge last week  - Will order D-Dimer, if elevated above age-adjusted range, will need to check CTA chest to r/o PE given thrombocytosis as well as unexplained microcytic anemia which is concerning for occult malignancy -> D-dimer 151, making VTE unlikely when adjusting for age, will forgo further PE workup for now  - Continue to monitor HR

## 2025-06-15 NOTE — DIETITIAN INITIAL EVALUATION ADULT - ADD RECOMMEND
1. Recommend to continue current Carbohydrate Consistent Diet with no snacks as ordered   2. Recommend Glucerna 1x daily (220kcals, 10g protein) to optimize PO intake   3. Continue to monitor PO intake, weight trend, electrolytes, blood glucose, labs, BMs in-house   4. Malnutrition notification placed in chart.   * Communicated above with team

## 2025-06-15 NOTE — DIETITIAN INITIAL EVALUATION ADULT - ORAL INTAKE PTA/DIET HISTORY
Patient reports good appetite/PO intake at baseline. Usually consumes 2-3 meals daily. Wife prepares meals daily. Follows a Regular diet, does not follow any dietary restrictions. Confirms no known food allergies. Takes a daily multivitamin, denies liquid protein supplement uses. Denies chewing/swallowing difficulties. No nausea/vomiting reported. States having regular bowel movements daily at home.

## 2025-06-15 NOTE — DIETITIAN INITIAL EVALUATION ADULT - PERTINENT MEDS FT
MEDICATIONS  (STANDING):  aspirin enteric coated 81 milliGRAM(s) Oral daily  atorvastatin 20 milliGRAM(s) Oral at bedtime  clopidogrel Tablet 75 milliGRAM(s) Oral daily  dextrose 5%. 1000 milliLiter(s) (50 mL/Hr) IV Continuous <Continuous>  dextrose 5%. 1000 milliLiter(s) (100 mL/Hr) IV Continuous <Continuous>  dextrose 50% Injectable 25 Gram(s) IV Push once  dextrose 50% Injectable 12.5 Gram(s) IV Push once  dextrose 50% Injectable 25 Gram(s) IV Push once  enoxaparin Injectable 40 milliGRAM(s) SubCutaneous every 24 hours  ezetimibe 10 milliGRAM(s) Oral daily  glucagon  Injectable 1 milliGRAM(s) IntraMuscular once  insulin lispro (ADMELOG) corrective regimen sliding scale   SubCutaneous three times a day before meals  insulin lispro (ADMELOG) corrective regimen sliding scale   SubCutaneous at bedtime  lactated ringers. 1000 milliLiter(s) (75 mL/Hr) IV Continuous <Continuous>  tamsulosin 0.4 milliGRAM(s) Oral at bedtime    MEDICATIONS  (PRN):  acetaminophen     Tablet .. 650 milliGRAM(s) Oral every 6 hours PRN Temp greater or equal to 38C (100.4F), Mild Pain (1 - 3)  dextrose Oral Gel 15 Gram(s) Oral once PRN Blood Glucose LESS THAN 70 milliGRAM(s)/deciliter  melatonin 3 milliGRAM(s) Oral at bedtime PRN Insomnia

## 2025-06-15 NOTE — DISCHARGE NOTE PROVIDER - ATTENDING DISCHARGE PHYSICAL EXAMINATION:
LOS: 2d    VITALS:   T(C): 36.6 (06-15-25 @ 11:39), Max: 36.8 (06-15-25 @ 04:32)  HR: 103 (06-15-25 @ 11:39) (80 - 103)  BP: 148/76 (06-15-25 @ 11:39) (135/75 - 165/89)  RR: 18 (06-15-25 @ 11:39) (18 - 18)  SpO2: 97% (06-15-25 @ 11:39) (97% - 99%)    GENERAL: NAD, lying in bed comfortably  HEAD:  Atraumatic, Normocephalic  EYES: EOMI, conjunctiva and sclera clear  ENT: Moist mucous membranes  NECK: Supple, No JVD  CHEST/LUNG: Clear to auscultation bilaterally; No rales, rhonchi, wheezing, or rubs. Unlabored respirations  HEART: Regular rate and rhythm; No murmurs, rubs, or gallops  ABDOMEN: BSx4; Soft, nontender, nondistended  EXTREMITIES:  2+ Peripheral Pulses, brisk capillary refill. No clubbing, cyanosis, or edema  NERVOUS SYSTEM:  A&Ox3, no focal deficits   SKIN: No rashes or lesions

## 2025-06-15 NOTE — DISCHARGE NOTE PROVIDER - HOSPITAL COURSE
HPI:  This is a 73 y/o male w/ PMHx CAD s/p PCI, HTN, T2DM, CKD3, and recent admission for legionella pneumonia wq/ associated LEIA on CKD presenting for hyperkalemia on outpatient labs. He was admitted here from 6/2-5 after presenting for cough and weakness, found to meet severe sepsis criteria w/ LLL PNA, found to be positive for legionella. Was discharged on 6/5, had to finish a PO course of azithromycin outpatient that ended on 6/8. He's been mostly well since discharge, but felt that his heart has been beating fast when he walks. Went to f/u w/ his PCP, and was found to be tachycardic to 120, as well as hyperkalemic to 6.7 w/ Cr of 2.38, so was sent to the ED for further evaluation.    In the ED, he was afebrile and hemodyamically stable, slightly tachycardic to 101, saturating well on RA. CBC w/ microcytic anemia at Hb 11 (better than discharge) and thrombocytosis of 822. CMP w/ hyperkalemia of 5.6 and LEIA on CKD w/ Cr 2.14, 5.7 and 2.06 on repeat. CT Chest w/o IV contrast showing decreased LLL consolidation and elevated L hemidiaphragm. Received 2L IVNS, and insulin shift w/ lokelma 10g in the ED, after which K normalized to 4.6 and Cr came down to 1.95.  (13 Jun 2025 21:51)    Hospital Course:  A 74-year-old male with a complex medical history including CAD, HTN, HLD, T2DM, and CKD3 presented with hyperkalemia and LEIA. His hyperkalemia improved with treatment and his LEIA is thought to have a pre-renal component. Further investigation is needed for microcytic anemia including a GI consult for colonoscopy.    Summary:  This 74-year-old male patient with a history of CAD, HTN, HLD, T2DM, and CKD3 was admitted for hyperkalemia and worsening LEIA following a recent hospitalization for Legionella pneumonia. His potassium level, initially 6.7, improved with IV fluids and discontinuation of losartan. The LEIA, with creatinine improving from 2.14 to 1.95 after IV fluids, is suspected to have a pre-renal component. His tachycardia, peaking at 120 bpm, is being monitored, with an unremarkable EKG and a D-dimer ruling out PE. He also presents with worsening microcytic anemia; iron studies and a planned colonoscopy will investigate further. His other medications, including ASA, Plavix, atorvastatin, ezetimibe, and metformin, are being continued/adjusted as needed. Coreg was added for blood pressure and tachycardia management.    Important Medication Changes and Reason:  Hold Losartan.  Continue ezetimibe 10mg daily.  Adjust metformin as needed.  Continue Coreg 6.25mg BID.  Continue ASA 81mg daily  Continue Plavix 75mg daily  Continue atorvastatin 20mg QHS      Active or Pending Issues Requiring Follow-up:  Follow up with GI for outpatient colonoscopy.    Advanced Directives:     [ X] Full code  [ ] DNR  [ ] Hospice    Discharge Diagnoses:  LEIA on CKD  Hyperkalemia--> resolved.

## 2025-06-15 NOTE — PROGRESS NOTE ADULT - ASSESSMENT
74M w/ PMH of HTN, HLD, CAD s/p PCI, T2DM and CKD3 recent admission for legionella PNA here for hyperkalemia on op lab and worsening LEIA     -cont asa/plavix for DAPT. EKG w/ sinus tachycardia, no STT changes   -cont atorva 20 mg qhs and zetia 10 mg qd   -initial K 6.7 improved w/ IVF and holding losartan  -monitor K and urine studies per nephro  -add coreg 6.25 mg bid for bp and tachycardia now off losartan   -monitor on tele for EKG changes     Severo Celis MD [facility]  Attending Interventional Cardiologist, [facility]/MYRNA.   Avaliable on [employer] Team    
73 y/o male w/ PMHx CAD s/p PCI, HTN, HLD, T2DM, CKD3, and recent admission for legionella pneumonia wq/ associated LEIA on CKD presenting for hyperkalemia on outpatient labs and tachycardia. Admitted for LEIA on CKD3.

## 2025-06-15 NOTE — DIETITIAN INITIAL EVALUATION ADULT - OTHER CALCULATIONS
Fluid needs defer to MD team, calculation based on dosing weight with consideration for malnutrition status

## 2025-06-15 NOTE — DISCHARGE NOTE PROVIDER - NSDCFUSCHEDAPPT_GEN_ALL_CORE_FT
Mis Massey  [facility] Physician [facility]  INTMED [address]  Scheduled Appointment: 06/20/2025    Silver Davison  [facility] Physician [facility]  ENDOCRIN [address]  Scheduled Appointment: 08/07/2025    [facility]  CARDIOLOGY [address]   Scheduled Appointment: 08/07/2025

## 2025-06-15 NOTE — DISCHARGE NOTE PROVIDER - NSDCMRMEDTOKEN_GEN_ALL_CORE_FT
Aspirin Enteric Coated 81 mg oral delayed release tablet: 1 tab(s) orally once a day  clopidogrel 75 mg oral tablet: 1 tab(s) orally once a day MDD: 1  Coreg 6.25 mg oral tablet: 1 tab(s) orally 2 times a day MDD: 2  ezetimibe 10 mg oral tablet: 1 tab(s) orally once a day  metFORMIN 500 mg oral tablet: 2 tab(s) orally 2 times a day  pravastatin 80 mg oral tablet: 1 tab(s) orally once a day (at bedtime)  tamsulosin 0.4 mg oral capsule: 1 cap(s) orally once a day (at bedtime)

## 2025-06-15 NOTE — DIETITIAN INITIAL EVALUATION ADULT - PERTINENT LABORATORY DATA
06-15    139  |  105  |  18  ----------------------------<  127[H]  4.4   |  21[L]  |  1.89[H]    Ca    9.0      15 Antolin 2025 05:38  Mg     1.6     06-15    TPro  5.8[L]  /  Alb  3.3  /  TBili  0.7  /  DBili  x   /  AST  16  /  ALT  20  /  AlkPhos  65  06-14  POCT Blood Glucose.: 149 mg/dL (06-15-25 @ 08:26)  A1C with Estimated Average Glucose Result: 7.8 % (06-03-25 @ 06:47)  A1C with Estimated Average Glucose Result: 6.7 % (09-17-24 @ 11:18)

## 2025-06-15 NOTE — DIETITIAN INITIAL EVALUATION ADULT - REASON INDICATOR FOR ASSESSMENT
Patient seen for "Consult for MST score 2 or >",Source: Pt, Electronic Medical Record. Chart reviewed, events noted.

## 2025-06-16 ENCOUNTER — TRANSCRIPTION ENCOUNTER (OUTPATIENT)
Age: 75
End: 2025-06-16

## 2025-06-18 ENCOUNTER — TRANSCRIPTION ENCOUNTER (OUTPATIENT)
Age: 75
End: 2025-06-18

## 2025-06-19 ENCOUNTER — APPOINTMENT (OUTPATIENT)
Dept: CARE COORDINATION | Facility: HOME HEALTH | Age: 75
End: 2025-06-19
Payer: MEDICARE

## 2025-06-19 PROCEDURE — 99349 HOME/RES VST EST MOD MDM 40: CPT | Mod: 93

## 2025-06-20 ENCOUNTER — APPOINTMENT (OUTPATIENT)
Dept: INTERNAL MEDICINE | Facility: CLINIC | Age: 75
End: 2025-06-20
Payer: MEDICARE

## 2025-06-20 VITALS — SYSTOLIC BLOOD PRESSURE: 120 MMHG | DIASTOLIC BLOOD PRESSURE: 78 MMHG

## 2025-06-20 VITALS
HEART RATE: 87 BPM | WEIGHT: 138 LBS | TEMPERATURE: 97.7 F | SYSTOLIC BLOOD PRESSURE: 130 MMHG | BODY MASS INDEX: 23.56 KG/M2 | DIASTOLIC BLOOD PRESSURE: 70 MMHG | HEIGHT: 64 IN | OXYGEN SATURATION: 99 %

## 2025-06-20 LAB
ALBUMIN SERPL ELPH-MCNC: 4.1 G/DL
ALP BLD-CCNC: 76 U/L
ALT SERPL-CCNC: 27 U/L
ANION GAP SERPL CALC-SCNC: 15 MMOL/L
AST SERPL-CCNC: 21 U/L
BASOPHILS # BLD AUTO: 0.05 K/UL
BASOPHILS NFR BLD AUTO: 0.7 %
BILIRUB SERPL-MCNC: 0.6 MG/DL
BUN SERPL-MCNC: 31 MG/DL
CALCIUM SERPL-MCNC: 9.8 MG/DL
CHLORIDE SERPL-SCNC: 103 MMOL/L
CO2 SERPL-SCNC: 17 MMOL/L
CREAT SERPL-MCNC: 1.72 MG/DL
EGFRCR SERPLBLD CKD-EPI 2021: 41 ML/MIN/1.73M2
EOSINOPHIL # BLD AUTO: 0.17 K/UL
EOSINOPHIL NFR BLD AUTO: 2.2 %
GLUCOSE SERPL-MCNC: 193 MG/DL
HCT VFR BLD CALC: 33.6 %
HGB BLD-MCNC: 10.1 G/DL
IMM GRANULOCYTES NFR BLD AUTO: 0.3 %
LYMPHOCYTES # BLD AUTO: 1.57 K/UL
LYMPHOCYTES NFR BLD AUTO: 20.5 %
MAN DIFF?: NORMAL
MCHC RBC-ENTMCNC: 24 PG
MCHC RBC-ENTMCNC: 30.1 G/DL
MCV RBC AUTO: 80 FL
MONOCYTES # BLD AUTO: 0.66 K/UL
MONOCYTES NFR BLD AUTO: 8.6 %
NEUTROPHILS # BLD AUTO: 5.17 K/UL
NEUTROPHILS NFR BLD AUTO: 67.7 %
PLATELET # BLD AUTO: 538 K/UL
POTASSIUM SERPL-SCNC: 5.2 MMOL/L
POTASSIUM SERPL-SCNC: 5.3 MMOL/L
PROT SERPL-MCNC: 6.6 G/DL
RBC # BLD: 4.2 M/UL
RBC # FLD: 17.2 %
SODIUM SERPL-SCNC: 135 MMOL/L
WBC # FLD AUTO: 7.64 K/UL

## 2025-06-20 PROCEDURE — G2211 COMPLEX E/M VISIT ADD ON: CPT

## 2025-06-20 PROCEDURE — 99214 OFFICE O/P EST MOD 30 MIN: CPT

## 2025-06-20 RX ORDER — CARVEDILOL 6.25 MG/1
6.25 TABLET, FILM COATED ORAL TWICE DAILY
Qty: 180 | Refills: 1 | Status: ACTIVE | COMMUNITY
Start: 1900-01-01 | End: 1900-01-01

## 2025-06-22 PROBLEM — R00.0 SINUS TACHYCARDIA: Status: ACTIVE | Noted: 2025-06-12

## 2025-06-24 ENCOUNTER — OUTPATIENT (OUTPATIENT)
Dept: OUTPATIENT SERVICES | Facility: HOSPITAL | Age: 75
LOS: 1 days | End: 2025-06-24
Payer: MEDICARE

## 2025-06-24 ENCOUNTER — APPOINTMENT (OUTPATIENT)
Dept: MRI IMAGING | Facility: IMAGING CENTER | Age: 75
End: 2025-06-24
Payer: MEDICARE

## 2025-06-24 ENCOUNTER — APPOINTMENT (OUTPATIENT)
Dept: RADIOLOGY | Facility: IMAGING CENTER | Age: 75
End: 2025-06-24
Payer: MEDICARE

## 2025-06-24 DIAGNOSIS — Z95.5 PRESENCE OF CORONARY ANGIOPLASTY IMPLANT AND GRAFT: Chronic | ICD-10-CM

## 2025-06-24 DIAGNOSIS — A48.1 LEGIONNAIRES' DISEASE: ICD-10-CM

## 2025-06-24 DIAGNOSIS — M48.8X2 OTHER SPECIFIED SPONDYLOPATHIES, CERVICAL REGION: Chronic | ICD-10-CM

## 2025-06-24 PROCEDURE — 74183 MRI ABD W/O CNTR FLWD CNTR: CPT | Mod: 26

## 2025-06-24 PROCEDURE — 71046 X-RAY EXAM CHEST 2 VIEWS: CPT

## 2025-06-24 PROCEDURE — 74183 MRI ABD W/O CNTR FLWD CNTR: CPT

## 2025-06-24 PROCEDURE — A9585: CPT

## 2025-06-24 PROCEDURE — 71046 X-RAY EXAM CHEST 2 VIEWS: CPT | Mod: 26

## 2025-07-10 ENCOUNTER — RX RENEWAL (OUTPATIENT)
Age: 75
End: 2025-07-10

## 2025-07-16 ENCOUNTER — OUTPATIENT (OUTPATIENT)
Dept: OUTPATIENT SERVICES | Facility: HOSPITAL | Age: 75
LOS: 1 days | Discharge: HOME | End: 2025-07-16

## 2025-07-16 DIAGNOSIS — D47.3 ESSENTIAL (HEMORRHAGIC) THROMBOCYTHEMIA: ICD-10-CM

## 2025-07-16 DIAGNOSIS — M48.8X2 OTHER SPECIFIED SPONDYLOPATHIES, CERVICAL REGION: Chronic | ICD-10-CM

## 2025-07-16 DIAGNOSIS — Z95.5 PRESENCE OF CORONARY ANGIOPLASTY IMPLANT AND GRAFT: Chronic | ICD-10-CM

## 2025-07-17 ENCOUNTER — RESULT REVIEW (OUTPATIENT)
Age: 75
End: 2025-07-17

## 2025-07-17 ENCOUNTER — APPOINTMENT (OUTPATIENT)
Dept: HEMATOLOGY ONCOLOGY | Facility: CLINIC | Age: 75
End: 2025-07-17
Payer: MEDICARE

## 2025-07-17 VITALS
WEIGHT: 138 LBS | HEIGHT: 64 IN | HEART RATE: 103 BPM | RESPIRATION RATE: 16 BRPM | DIASTOLIC BLOOD PRESSURE: 80 MMHG | OXYGEN SATURATION: 99 % | TEMPERATURE: 97.7 F | BODY MASS INDEX: 23.56 KG/M2 | SYSTOLIC BLOOD PRESSURE: 175 MMHG

## 2025-07-17 LAB
BASOPHILS # BLD AUTO: 0.05 K/UL — SIGNIFICANT CHANGE UP (ref 0–0.2)
BASOPHILS NFR BLD AUTO: 0.6 % — SIGNIFICANT CHANGE UP (ref 0–2)
EOSINOPHIL # BLD AUTO: 0.71 K/UL — HIGH (ref 0–0.5)
EOSINOPHIL NFR BLD AUTO: 8.8 % — HIGH (ref 0–6)
FERRITIN SERPL-MCNC: 15 NG/ML
HCT VFR BLD CALC: 32.6 % — LOW (ref 39–50)
HGB BLD-MCNC: 10.1 G/DL — LOW (ref 13–17)
IMM GRANULOCYTES NFR BLD AUTO: 0.1 % — SIGNIFICANT CHANGE UP (ref 0–0.9)
IRON SATN MFR SERPL: 11 %
IRON SERPL-MCNC: 46 UG/DL
LDH SERPL-CCNC: 160 U/L
LYMPHOCYTES # BLD AUTO: 1.76 K/UL — SIGNIFICANT CHANGE UP (ref 1–3.3)
LYMPHOCYTES # BLD AUTO: 21.8 % — SIGNIFICANT CHANGE UP (ref 13–44)
MCHC RBC-ENTMCNC: 24.5 PG — LOW (ref 27–34)
MCHC RBC-ENTMCNC: 31 G/DL — LOW (ref 32–36)
MCV RBC AUTO: 79.1 FL — LOW (ref 80–100)
MONOCYTES # BLD AUTO: 0.75 K/UL — SIGNIFICANT CHANGE UP (ref 0–0.9)
MONOCYTES NFR BLD AUTO: 9.3 % — SIGNIFICANT CHANGE UP (ref 2–14)
NEUTROPHILS # BLD AUTO: 4.78 K/UL — SIGNIFICANT CHANGE UP (ref 1.8–7.4)
NEUTROPHILS NFR BLD AUTO: 59.4 % — SIGNIFICANT CHANGE UP (ref 43–77)
NRBC BLD AUTO-RTO: 0 /100 WBCS — SIGNIFICANT CHANGE UP (ref 0–0)
PLATELET # BLD AUTO: 406 K/UL — HIGH (ref 150–400)
RBC # BLD: 4.12 M/UL — LOW (ref 4.2–5.8)
RBC # FLD: 17.5 % — HIGH (ref 10.3–14.5)
TIBC SERPL-MCNC: 410 UG/DL
UIBC SERPL-MCNC: 365 UG/DL
WBC # BLD: 8.06 K/UL — SIGNIFICANT CHANGE UP (ref 3.8–10.5)
WBC # FLD AUTO: 8.06 K/UL — SIGNIFICANT CHANGE UP (ref 3.8–10.5)

## 2025-07-17 PROCEDURE — 99214 OFFICE O/P EST MOD 30 MIN: CPT

## 2025-07-23 ENCOUNTER — APPOINTMENT (OUTPATIENT)
Dept: NEPHROLOGY | Facility: CLINIC | Age: 75
End: 2025-07-23

## 2025-08-07 ENCOUNTER — APPOINTMENT (OUTPATIENT)
Dept: ENDOCRINOLOGY | Facility: CLINIC | Age: 75
End: 2025-08-07
Payer: MEDICARE

## 2025-08-07 ENCOUNTER — APPOINTMENT (OUTPATIENT)
Dept: CARDIOLOGY | Facility: CLINIC | Age: 75
End: 2025-08-07
Payer: MEDICARE

## 2025-08-07 VITALS
WEIGHT: 139 LBS | HEIGHT: 64 IN | HEART RATE: 80 BPM | DIASTOLIC BLOOD PRESSURE: 84 MMHG | BODY MASS INDEX: 23.73 KG/M2 | OXYGEN SATURATION: 99 % | TEMPERATURE: 97.7 F | SYSTOLIC BLOOD PRESSURE: 120 MMHG

## 2025-08-07 VITALS
SYSTOLIC BLOOD PRESSURE: 142 MMHG | BODY MASS INDEX: 23.41 KG/M2 | OXYGEN SATURATION: 98 % | HEIGHT: 64 IN | WEIGHT: 137.13 LBS | HEART RATE: 88 BPM | DIASTOLIC BLOOD PRESSURE: 80 MMHG

## 2025-08-07 DIAGNOSIS — E27.9 DISORDER OF ADRENAL GLAND, UNSPECIFIED: ICD-10-CM

## 2025-08-07 DIAGNOSIS — R94.31 ABNORMAL ELECTROCARDIOGRAM [ECG] [EKG]: ICD-10-CM

## 2025-08-07 DIAGNOSIS — E78.5 HYPERLIPIDEMIA, UNSPECIFIED: ICD-10-CM

## 2025-08-07 DIAGNOSIS — D64.9 ANEMIA, UNSPECIFIED: ICD-10-CM

## 2025-08-07 DIAGNOSIS — E87.5 HYPERKALEMIA: ICD-10-CM

## 2025-08-07 DIAGNOSIS — R79.89 OTHER SPECIFIED ABNORMAL FINDINGS OF BLOOD CHEMISTRY: ICD-10-CM

## 2025-08-07 DIAGNOSIS — I65.23 OCCLUSION AND STENOSIS OF BILATERAL CAROTID ARTERIES: ICD-10-CM

## 2025-08-07 DIAGNOSIS — E55.9 VITAMIN D DEFICIENCY, UNSPECIFIED: ICD-10-CM

## 2025-08-07 DIAGNOSIS — I25.10 ATHEROSCLEROTIC HEART DISEASE OF NATIVE CORONARY ARTERY W/OUT ANGINA PECTORIS: ICD-10-CM

## 2025-08-07 DIAGNOSIS — I10 ESSENTIAL (PRIMARY) HYPERTENSION: ICD-10-CM

## 2025-08-07 DIAGNOSIS — Z13.228 ENCOUNTER FOR SCREENING FOR OTHER METABOLIC DISORDERS: ICD-10-CM

## 2025-08-07 DIAGNOSIS — E11.9 TYPE 2 DIABETES MELLITUS W/OUT COMPLICATIONS: ICD-10-CM

## 2025-08-07 DIAGNOSIS — A48.1 LEGIONNAIRES' DISEASE: ICD-10-CM

## 2025-08-07 DIAGNOSIS — R07.89 OTHER CHEST PAIN: ICD-10-CM

## 2025-08-07 LAB
GLUCOSE BLDC GLUCOMTR-MCNC: 149
HBA1C MFR BLD HPLC: 7.2

## 2025-08-07 PROCEDURE — 99214 OFFICE O/P EST MOD 30 MIN: CPT | Mod: 25

## 2025-08-07 PROCEDURE — 82962 GLUCOSE BLOOD TEST: CPT

## 2025-08-07 PROCEDURE — 83036 HEMOGLOBIN GLYCOSYLATED A1C: CPT | Mod: QW

## 2025-08-07 PROCEDURE — 93000 ELECTROCARDIOGRAM COMPLETE: CPT

## 2025-08-08 LAB
ALBUMIN SERPL ELPH-MCNC: 4.9 G/DL
ALP BLD-CCNC: 58 U/L
ALT SERPL-CCNC: 12 U/L
APPEARANCE: CLEAR
AST SERPL-CCNC: 20 U/L
BACTERIA: NEGATIVE /HPF
BILIRUB DIRECT SERPL-MCNC: 0.23 MG/DL
BILIRUB INDIRECT SERPL-MCNC: 0.5 MG/DL
BILIRUB SERPL-MCNC: 0.7 MG/DL
BILIRUBIN URINE: NEGATIVE
BLOOD URINE: NEGATIVE
CAST: 2 /LPF
CK SERPL-CCNC: 70 U/L
COLOR: YELLOW
EPITHELIAL CELLS: 0 /HPF
GLUCOSE QUALITATIVE U: NEGATIVE MG/DL
KETONES URINE: NEGATIVE MG/DL
LEUKOCYTE ESTERASE URINE: ABNORMAL
MICROSCOPIC-UA: NORMAL
NITRITE URINE: NEGATIVE
PH URINE: 5.5
POTASSIUM SERPL-SCNC: 6.4 MMOL/L
PROT SERPL-MCNC: 7.4 G/DL
PROTEIN URINE: NORMAL MG/DL
RED BLOOD CELLS URINE: 0 /HPF
SPECIFIC GRAVITY URINE: 1.02
UROBILINOGEN URINE: 0.2 MG/DL
WHITE BLOOD CELLS URINE: 1 /HPF

## 2025-08-11 LAB
ANION GAP SERPL CALC-SCNC: 12 MMOL/L
BUN SERPL-MCNC: 35 MG/DL
CALCIUM SERPL-MCNC: 9.8 MG/DL
CHLORIDE SERPL-SCNC: 104 MMOL/L
CO2 SERPL-SCNC: 21 MMOL/L
CREAT SERPL-MCNC: 1.55 MG/DL
EGFRCR SERPLBLD CKD-EPI 2021: 46 ML/MIN/1.73M2
GLUCOSE SERPL-MCNC: 170 MG/DL
POTASSIUM SERPL-SCNC: 5.1 MMOL/L
POTASSIUM SERPL-SCNC: 5.3 MMOL/L
SODIUM SERPL-SCNC: 136 MMOL/L

## 2025-08-11 RX ORDER — SODIUM ZIRCONIUM CYCLOSILICATE 10 G/10G
10 POWDER, FOR SUSPENSION ORAL
Qty: 15 | Refills: 0 | Status: ACTIVE | COMMUNITY
Start: 2025-08-08 | End: 1900-01-01

## 2025-08-17 PROCEDURE — 93241 XTRNL ECG REC>48HR<7D: CPT

## 2025-09-10 ENCOUNTER — APPOINTMENT (OUTPATIENT)
Dept: GASTROENTEROLOGY | Facility: CLINIC | Age: 75
End: 2025-09-10
Payer: MEDICARE

## 2025-09-10 ENCOUNTER — APPOINTMENT (OUTPATIENT)
Dept: CARDIOLOGY | Facility: CLINIC | Age: 75
End: 2025-09-10
Payer: MEDICARE

## 2025-09-10 VITALS
HEIGHT: 64 IN | RESPIRATION RATE: 14 BRPM | WEIGHT: 137.38 LBS | HEART RATE: 67 BPM | TEMPERATURE: 98 F | DIASTOLIC BLOOD PRESSURE: 80 MMHG | SYSTOLIC BLOOD PRESSURE: 142 MMHG | OXYGEN SATURATION: 99 % | BODY MASS INDEX: 23.45 KG/M2

## 2025-09-10 DIAGNOSIS — R79.89 OTHER SPECIFIED ABNORMAL FINDINGS OF BLOOD CHEMISTRY: ICD-10-CM

## 2025-09-10 DIAGNOSIS — Z12.11 ENCOUNTER FOR SCREENING FOR MALIGNANT NEOPLASM OF COLON: ICD-10-CM

## 2025-09-10 DIAGNOSIS — I25.10 ATHEROSCLEROTIC HEART DISEASE OF NATIVE CORONARY ARTERY W/OUT ANGINA PECTORIS: ICD-10-CM

## 2025-09-10 DIAGNOSIS — I10 ESSENTIAL (PRIMARY) HYPERTENSION: ICD-10-CM

## 2025-09-10 DIAGNOSIS — R07.89 OTHER CHEST PAIN: ICD-10-CM

## 2025-09-10 PROCEDURE — 99214 OFFICE O/P EST MOD 30 MIN: CPT | Mod: 25

## 2025-09-10 PROCEDURE — A9502: CPT

## 2025-09-10 PROCEDURE — 78452 HT MUSCLE IMAGE SPECT MULT: CPT

## 2025-09-10 PROCEDURE — 93015 CV STRESS TEST SUPVJ I&R: CPT

## 2025-09-10 RX ORDER — SODIUM SULFATE, POTASSIUM SULFATE AND MAGNESIUM SULFATE 1.6; 3.13; 17.5 G/177ML; G/177ML; G/177ML
17.5-3.13-1.6 SOLUTION ORAL TWICE DAILY
Qty: 2 | Refills: 0 | Status: ACTIVE | COMMUNITY
Start: 2025-09-10 | End: 1900-01-01

## 2025-09-11 LAB
ALBUMIN SERPL ELPH-MCNC: 4.3 G/DL
ALP BLD-CCNC: 58 U/L
ALT SERPL-CCNC: 15 U/L
ANION GAP SERPL CALC-SCNC: 14 MMOL/L
AST SERPL-CCNC: 18 U/L
BASOPHILS # BLD AUTO: 0.05 K/UL
BASOPHILS NFR BLD AUTO: 0.6 %
BILIRUB SERPL-MCNC: 0.7 MG/DL
BUN SERPL-MCNC: 20 MG/DL
CALCIUM SERPL-MCNC: 9.4 MG/DL
CHLORIDE SERPL-SCNC: 103 MMOL/L
CO2 SERPL-SCNC: 21 MMOL/L
CREAT SERPL-MCNC: 1.46 MG/DL
EGFRCR SERPLBLD CKD-EPI 2021: 50 ML/MIN/1.73M2
EOSINOPHIL # BLD AUTO: 0.55 K/UL
EOSINOPHIL NFR BLD AUTO: 6.2 %
FERRITIN SERPL-MCNC: 12 NG/ML
GLUCOSE SERPL-MCNC: 176 MG/DL
HCT VFR BLD CALC: 37 %
HGB BLD-MCNC: 11.4 G/DL
IMM GRANULOCYTES NFR BLD AUTO: 0.2 %
IRON SATN MFR SERPL: 6 %
IRON SERPL-MCNC: 27 UG/DL
LYMPHOCYTES # BLD AUTO: 2.33 K/UL
LYMPHOCYTES NFR BLD AUTO: 26.4 %
MAN DIFF?: NORMAL
MCHC RBC-ENTMCNC: 25.7 PG
MCHC RBC-ENTMCNC: 30.8 G/DL
MCV RBC AUTO: 83.3 FL
MONOCYTES # BLD AUTO: 0.68 K/UL
MONOCYTES NFR BLD AUTO: 7.7 %
NEUTROPHILS # BLD AUTO: 5.19 K/UL
NEUTROPHILS NFR BLD AUTO: 58.9 %
PLATELET # BLD AUTO: 484 K/UL
POTASSIUM SERPL-SCNC: 4.8 MMOL/L
PROT SERPL-MCNC: 7.1 G/DL
RBC # BLD: 4.44 M/UL
RBC # FLD: 15.5 %
SODIUM SERPL-SCNC: 139 MMOL/L
TIBC SERPL-MCNC: 455 UG/DL
UIBC SERPL-MCNC: 429 UG/DL
WBC # FLD AUTO: 8.82 K/UL